# Patient Record
Sex: FEMALE | Race: WHITE | NOT HISPANIC OR LATINO
[De-identification: names, ages, dates, MRNs, and addresses within clinical notes are randomized per-mention and may not be internally consistent; named-entity substitution may affect disease eponyms.]

---

## 2017-04-12 ENCOUNTER — APPOINTMENT (OUTPATIENT)
Dept: FAMILY MEDICINE | Facility: CLINIC | Age: 67
End: 2017-04-12

## 2017-04-12 VITALS
HEART RATE: 87 BPM | TEMPERATURE: 98.3 F | WEIGHT: 190 LBS | OXYGEN SATURATION: 97 % | DIASTOLIC BLOOD PRESSURE: 84 MMHG | SYSTOLIC BLOOD PRESSURE: 142 MMHG | HEIGHT: 63 IN | BODY MASS INDEX: 33.66 KG/M2

## 2017-08-21 ENCOUNTER — RX RENEWAL (OUTPATIENT)
Age: 67
End: 2017-08-21

## 2017-10-21 ENCOUNTER — RX RENEWAL (OUTPATIENT)
Age: 67
End: 2017-10-21

## 2017-10-31 ENCOUNTER — RESULT CHARGE (OUTPATIENT)
Age: 67
End: 2017-10-31

## 2017-11-01 ENCOUNTER — NON-APPOINTMENT (OUTPATIENT)
Age: 67
End: 2017-11-01

## 2017-11-01 ENCOUNTER — APPOINTMENT (OUTPATIENT)
Dept: FAMILY MEDICINE | Facility: CLINIC | Age: 67
End: 2017-11-01
Payer: MEDICARE

## 2017-11-01 VITALS
HEIGHT: 63 IN | SYSTOLIC BLOOD PRESSURE: 134 MMHG | WEIGHT: 183.38 LBS | BODY MASS INDEX: 32.49 KG/M2 | HEART RATE: 75 BPM | DIASTOLIC BLOOD PRESSURE: 90 MMHG | OXYGEN SATURATION: 98 %

## 2017-11-01 PROCEDURE — 93000 ELECTROCARDIOGRAM COMPLETE: CPT | Mod: 59

## 2017-11-01 PROCEDURE — G0439: CPT

## 2017-11-01 PROCEDURE — 36415 COLL VENOUS BLD VENIPUNCTURE: CPT

## 2017-11-02 LAB
25(OH)D3 SERPL-MCNC: 12.1 NG/ML
ALBUMIN SERPL ELPH-MCNC: 4 G/DL
ALP BLD-CCNC: 93 U/L
ALT SERPL-CCNC: 7 U/L
ANION GAP SERPL CALC-SCNC: 23 MMOL/L
APPEARANCE: CLEAR
AST SERPL-CCNC: 18 U/L
BACTERIA: NEGATIVE
BASOPHILS # BLD AUTO: 0.06 K/UL
BASOPHILS NFR BLD AUTO: 0.7 %
BILIRUB SERPL-MCNC: <0.2 MG/DL
BILIRUBIN URINE: NEGATIVE
BLOOD URINE: NEGATIVE
BUN SERPL-MCNC: 20 MG/DL
CALCIUM SERPL-MCNC: 9.4 MG/DL
CHLORIDE SERPL-SCNC: 99 MMOL/L
CHOLEST SERPL-MCNC: 211 MG/DL
CHOLEST/HDLC SERPL: 4.7 RATIO
CO2 SERPL-SCNC: 18 MMOL/L
COLOR: YELLOW
CREAT SERPL-MCNC: 0.9 MG/DL
EOSINOPHIL # BLD AUTO: 0.36 K/UL
EOSINOPHIL NFR BLD AUTO: 4.1 %
GLUCOSE QUALITATIVE U: NEGATIVE MG/DL
GLUCOSE SERPL-MCNC: 74 MG/DL
HBA1C MFR BLD HPLC: 5.7 %
HCT VFR BLD CALC: 36.4 %
HDLC SERPL-MCNC: 45 MG/DL
HGB BLD-MCNC: 11.9 G/DL
HYALINE CASTS: 2 /LPF
IMM GRANULOCYTES NFR BLD AUTO: 0.2 %
KETONES URINE: NEGATIVE
LDLC SERPL CALC-MCNC: 125 MG/DL
LEUKOCYTE ESTERASE URINE: ABNORMAL
LYMPHOCYTES # BLD AUTO: 2.24 K/UL
LYMPHOCYTES NFR BLD AUTO: 25.6 %
MAN DIFF?: NORMAL
MCHC RBC-ENTMCNC: 28.3 PG
MCHC RBC-ENTMCNC: 32.7 GM/DL
MCV RBC AUTO: 86.7 FL
MICROSCOPIC-UA: NORMAL
MONOCYTES # BLD AUTO: 0.6 K/UL
MONOCYTES NFR BLD AUTO: 6.8 %
NEUTROPHILS # BLD AUTO: 5.48 K/UL
NEUTROPHILS NFR BLD AUTO: 62.6 %
NITRITE URINE: NEGATIVE
PH URINE: 5.5
PLATELET # BLD AUTO: 279 K/UL
POTASSIUM SERPL-SCNC: 4.2 MMOL/L
PROT SERPL-MCNC: 7.8 G/DL
PROTEIN URINE: NEGATIVE MG/DL
RBC # BLD: 4.2 M/UL
RBC # FLD: 13.4 %
RED BLOOD CELLS URINE: 1 /HPF
SODIUM SERPL-SCNC: 140 MMOL/L
SPECIFIC GRAVITY URINE: 1.02
SQUAMOUS EPITHELIAL CELLS: 2 /HPF
T4 SERPL-MCNC: 8.2 UG/DL
TRIGL SERPL-MCNC: 205 MG/DL
TSH SERPL-ACNC: 6.67 UIU/ML
UROBILINOGEN URINE: NEGATIVE MG/DL
WBC # FLD AUTO: 8.76 K/UL
WHITE BLOOD CELLS URINE: 30 /HPF

## 2017-12-20 ENCOUNTER — RESULT CHARGE (OUTPATIENT)
Age: 67
End: 2017-12-20

## 2017-12-20 ENCOUNTER — APPOINTMENT (OUTPATIENT)
Dept: FAMILY MEDICINE | Facility: CLINIC | Age: 67
End: 2017-12-20
Payer: MEDICARE

## 2017-12-20 VITALS
SYSTOLIC BLOOD PRESSURE: 140 MMHG | HEART RATE: 88 BPM | WEIGHT: 183 LBS | BODY MASS INDEX: 32.43 KG/M2 | OXYGEN SATURATION: 98 % | DIASTOLIC BLOOD PRESSURE: 90 MMHG | TEMPERATURE: 98.3 F | HEIGHT: 63 IN

## 2017-12-20 DIAGNOSIS — Z87.891 PERSONAL HISTORY OF NICOTINE DEPENDENCE: ICD-10-CM

## 2017-12-20 DIAGNOSIS — Z87.09 PERSONAL HISTORY OF OTHER DISEASES OF THE RESPIRATORY SYSTEM: ICD-10-CM

## 2017-12-20 DIAGNOSIS — Z13.220 ENCOUNTER FOR SCREENING FOR LIPOID DISORDERS: ICD-10-CM

## 2017-12-20 DIAGNOSIS — Z13.1 ENCOUNTER FOR SCREENING FOR DIABETES MELLITUS: ICD-10-CM

## 2017-12-20 DIAGNOSIS — Z13.29 ENCOUNTER FOR SCREENING FOR OTHER SUSPECTED ENDOCRINE DISORDER: ICD-10-CM

## 2017-12-20 LAB
FLUAV SPEC QL CULT: NORMAL
FLUBV AG SPEC QL IA: NORMAL

## 2017-12-20 PROCEDURE — 99213 OFFICE O/P EST LOW 20 MIN: CPT

## 2018-03-08 ENCOUNTER — LABORATORY RESULT (OUTPATIENT)
Age: 68
End: 2018-03-08

## 2018-03-09 ENCOUNTER — RX RENEWAL (OUTPATIENT)
Age: 68
End: 2018-03-09

## 2018-03-13 ENCOUNTER — APPOINTMENT (OUTPATIENT)
Dept: FAMILY MEDICINE | Facility: CLINIC | Age: 68
End: 2018-03-13

## 2018-09-13 ENCOUNTER — APPOINTMENT (OUTPATIENT)
Dept: FAMILY MEDICINE | Facility: CLINIC | Age: 68
End: 2018-09-13
Payer: MEDICARE

## 2018-09-13 VITALS
DIASTOLIC BLOOD PRESSURE: 90 MMHG | WEIGHT: 185 LBS | HEART RATE: 83 BPM | TEMPERATURE: 98.3 F | OXYGEN SATURATION: 96 % | SYSTOLIC BLOOD PRESSURE: 132 MMHG | HEIGHT: 63 IN | BODY MASS INDEX: 32.78 KG/M2

## 2018-09-13 DIAGNOSIS — Z87.09 PERSONAL HISTORY OF OTHER DISEASES OF THE RESPIRATORY SYSTEM: ICD-10-CM

## 2018-09-13 PROCEDURE — 99213 OFFICE O/P EST LOW 20 MIN: CPT

## 2018-09-13 NOTE — PHYSICAL EXAM
[Well Developed] : well developed [Well Nourished] : well nourished [Normal Outer Ear/Nose] : the outer ears and nose were normal in appearance [Normal TMs] : both tympanic membranes were normal [No JVD] : no jugular venous distention [Supple] : supple [No Lymphadenopathy] : no lymphadenopathy [No Respiratory Distress] : no respiratory distress  [No Accessory Muscle Use] : no accessory muscle use [de-identified] : Nasal congestion, pharynx red [de-identified] : Congested cough.  Wheeze, left posterior upper lobe

## 2018-09-13 NOTE — HISTORY OF PRESENT ILLNESS
[FreeTextEntry8] : Cold and congested for two days. Congested, hacking cough. Very tired and sleeping more. Still not smoking for two years. Not needing to use inhalers yet. Uses Breo when needed.\par ENT two weeks ago and was on steroids. \par Episodes of being off balance. No dizziness or falls. She knows when she feels off balance and will walk into things. 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  Ongoing ear issues and less hearing on left. \par Plans to get colonoscopy

## 2018-09-18 ENCOUNTER — RX RENEWAL (OUTPATIENT)
Age: 68
End: 2018-09-18

## 2018-11-07 ENCOUNTER — RX RENEWAL (OUTPATIENT)
Age: 68
End: 2018-11-07

## 2018-11-08 ENCOUNTER — RX RENEWAL (OUTPATIENT)
Age: 68
End: 2018-11-08

## 2018-11-14 ENCOUNTER — APPOINTMENT (OUTPATIENT)
Dept: FAMILY MEDICINE | Facility: CLINIC | Age: 68
End: 2018-11-14
Payer: MEDICARE

## 2018-11-14 ENCOUNTER — NON-APPOINTMENT (OUTPATIENT)
Age: 68
End: 2018-11-14

## 2018-11-14 VITALS
WEIGHT: 185 LBS | OXYGEN SATURATION: 96 % | DIASTOLIC BLOOD PRESSURE: 90 MMHG | SYSTOLIC BLOOD PRESSURE: 120 MMHG | TEMPERATURE: 98.5 F | BODY MASS INDEX: 33.19 KG/M2 | HEART RATE: 91 BPM | HEIGHT: 62.5 IN

## 2018-11-14 DIAGNOSIS — Z87.09 PERSONAL HISTORY OF OTHER DISEASES OF THE RESPIRATORY SYSTEM: ICD-10-CM

## 2018-11-14 DIAGNOSIS — Z08 ENCOUNTER FOR FOLLOW-UP EXAMINATION AFTER COMPLETED TREATMENT FOR MALIGNANT NEOPLASM: ICD-10-CM

## 2018-11-14 DIAGNOSIS — Z85.3 ENCOUNTER FOR FOLLOW-UP EXAMINATION AFTER COMPLETED TREATMENT FOR MALIGNANT NEOPLASM: ICD-10-CM

## 2018-11-14 PROCEDURE — G0439: CPT

## 2018-11-14 PROCEDURE — 93000 ELECTROCARDIOGRAM COMPLETE: CPT | Mod: 59

## 2018-11-14 PROCEDURE — G0444 DEPRESSION SCREEN ANNUAL: CPT | Mod: 59

## 2018-11-14 RX ORDER — AZITHROMYCIN 250 MG/1
250 TABLET, FILM COATED ORAL
Qty: 1 | Refills: 0 | Status: DISCONTINUED | COMMUNITY
Start: 2018-09-13 | End: 2018-11-14

## 2018-11-14 RX ORDER — AZITHROMYCIN 250 MG/1
250 TABLET, FILM COATED ORAL
Qty: 1 | Refills: 0 | Status: DISCONTINUED | COMMUNITY
Start: 2017-12-20 | End: 2018-11-14

## 2018-11-14 NOTE — PHYSICAL EXAM
[Well Developed] : well developed [Well Nourished] : well nourished [Normal Outer Ear/Nose] : the outer ears and nose were normal in appearance [Normal Oropharynx] : the oropharynx was normal [Normal TMs] : both tympanic membranes were normal [No JVD] : no jugular venous distention [Supple] : supple [No Respiratory Distress] : no respiratory distress  [Clear to Auscultation] : lungs were clear to auscultation bilaterally [No Accessory Muscle Use] : no accessory muscle use [Normal Rate] : normal rate  [Regular Rhythm] : with a regular rhythm [Normal S1, S2] : normal S1 and S2 [No Murmur] : no murmur heard [Normal] : normal [Soft, Nontender] : the abdomen was soft and nontender [No Mass] : no masses were palpated [No HSM] : no hepatosplenomegaly noted [Normal Supraclavicular Nodes] : no supraclavicular lymphadenopathy [Normal Posterior Cervical Nodes] : no posterior cervical lymphadenopathy [Grossly Normal Strength/Tone] : grossly normal strength/tone [No Focal Deficits] : no focal deficits [Normal Mental Status] : the patient's orientation, memory, attention, language and fund of knowledge were normal [Appropriate] : appropriate [Impaired judgment] : intact judgment [Impaired Insight] : intact insight [de-identified] : Non-tender enlarged lymph node left anterior cervical

## 2018-11-14 NOTE — HISTORY OF PRESENT ILLNESS
[de-identified] : Annual CPE.  Follow up on depression, hypothyroid and labile hypertension. She stopped losartan as her BP was always normal when taken. \par She sees pulmonary on a regular basis for COPD.  Recent Xray that showed pneumonia also showed lung nodule. She is going for PET scan.  She is very nervous about the results. She is having PFT tomorrow.\par She has seen ENT and had two US done of enlarged lymph node left side of neck.  No biopsy taken. She feels the node is larger now.\shiloh Takes vitamin D 1000 units daily\shiloh Will be in [place] for three weeks caring for her sister who just had double lung transplant. Very stressful for patient.

## 2018-11-15 LAB
25(OH)D3 SERPL-MCNC: 30.7 NG/ML
ALBUMIN SERPL ELPH-MCNC: 4.3 G/DL
ALP BLD-CCNC: 90 U/L
ALT SERPL-CCNC: 6 U/L
ANION GAP SERPL CALC-SCNC: 12 MMOL/L
APPEARANCE: CLEAR
AST SERPL-CCNC: 18 U/L
BACTERIA: NEGATIVE
BASOPHILS # BLD AUTO: 0.06 K/UL
BASOPHILS NFR BLD AUTO: 0.7 %
BILIRUB SERPL-MCNC: <0.2 MG/DL
BILIRUBIN URINE: NEGATIVE
BLOOD URINE: ABNORMAL
BUN SERPL-MCNC: 22 MG/DL
CALCIUM SERPL-MCNC: 9.8 MG/DL
CHLORIDE SERPL-SCNC: 102 MMOL/L
CHOLEST SERPL-MCNC: 224 MG/DL
CHOLEST/HDLC SERPL: 3.9 RATIO
CO2 SERPL-SCNC: 26 MMOL/L
COLOR: YELLOW
CREAT SERPL-MCNC: 1.28 MG/DL
EOSINOPHIL # BLD AUTO: 0.36 K/UL
EOSINOPHIL NFR BLD AUTO: 4 %
GLUCOSE QUALITATIVE U: NEGATIVE MG/DL
GLUCOSE SERPL-MCNC: 83 MG/DL
HBA1C MFR BLD HPLC: 5.7 %
HCT VFR BLD CALC: 38.5 %
HDLC SERPL-MCNC: 57 MG/DL
HGB BLD-MCNC: 12.3 G/DL
HYALINE CASTS: 2 /LPF
IMM GRANULOCYTES NFR BLD AUTO: 0.2 %
KETONES URINE: NEGATIVE
LDLC SERPL CALC-MCNC: 141 MG/DL
LEUKOCYTE ESTERASE URINE: ABNORMAL
LYMPHOCYTES # BLD AUTO: 1.87 K/UL
LYMPHOCYTES NFR BLD AUTO: 20.7 %
MAN DIFF?: NORMAL
MCHC RBC-ENTMCNC: 27.2 PG
MCHC RBC-ENTMCNC: 31.9 GM/DL
MCV RBC AUTO: 85.2 FL
MICROSCOPIC-UA: NORMAL
MONOCYTES # BLD AUTO: 0.62 K/UL
MONOCYTES NFR BLD AUTO: 6.9 %
NEUTROPHILS # BLD AUTO: 6.12 K/UL
NEUTROPHILS NFR BLD AUTO: 67.5 %
NITRITE URINE: NEGATIVE
PH URINE: 6
PLATELET # BLD AUTO: 312 K/UL
POTASSIUM SERPL-SCNC: 4.4 MMOL/L
PROT SERPL-MCNC: 7.8 G/DL
PROTEIN URINE: NEGATIVE MG/DL
RBC # BLD: 4.52 M/UL
RBC # FLD: 13.8 %
RED BLOOD CELLS URINE: 5 /HPF
SODIUM SERPL-SCNC: 140 MMOL/L
SPECIFIC GRAVITY URINE: 1.02
SQUAMOUS EPITHELIAL CELLS: 7 /HPF
T4 SERPL-MCNC: 9.1 UG/DL
TRIGL SERPL-MCNC: 132 MG/DL
TSH SERPL-ACNC: 1.89 UIU/ML
UROBILINOGEN URINE: NEGATIVE MG/DL
WBC # FLD AUTO: 9.05 K/UL
WHITE BLOOD CELLS URINE: 49 /HPF

## 2018-11-16 ENCOUNTER — RX RENEWAL (OUTPATIENT)
Age: 68
End: 2018-11-16

## 2018-12-29 ENCOUNTER — APPOINTMENT (OUTPATIENT)
Dept: FAMILY MEDICINE | Facility: CLINIC | Age: 68
End: 2018-12-29
Payer: MEDICARE

## 2018-12-29 VITALS
HEART RATE: 75 BPM | WEIGHT: 185 LBS | HEIGHT: 62.5 IN | DIASTOLIC BLOOD PRESSURE: 78 MMHG | BODY MASS INDEX: 33.19 KG/M2 | OXYGEN SATURATION: 98 % | SYSTOLIC BLOOD PRESSURE: 122 MMHG

## 2018-12-29 DIAGNOSIS — M54.30 SCIATICA, UNSPECIFIED SIDE: ICD-10-CM

## 2018-12-29 DIAGNOSIS — J20.9 ACUTE BRONCHITIS, UNSPECIFIED: ICD-10-CM

## 2018-12-29 PROCEDURE — 99214 OFFICE O/P EST MOD 30 MIN: CPT

## 2018-12-29 NOTE — PHYSICAL EXAM
[No Acute Distress] : no acute distress [Well-Appearing] : well-appearing [Normal Sclera/Conjunctiva] : normal sclera/conjunctiva [PERRL] : pupils equal round and reactive to light [Normal Outer Ear/Nose] : the outer ears and nose were normal in appearance [Normal Oropharynx] : the oropharynx was normal [Supple] : supple [No Respiratory Distress] : no respiratory distress  [Clear to Auscultation] : lungs were clear to auscultation bilaterally [Normal Rate] : normal rate  [Regular Rhythm] : with a regular rhythm [Normal Affect] : the affect was normal [Normal Insight/Judgement] : insight and judgment were intact [de-identified] : + [de-identified] : + [de-identified] : +TTP with AC lymphadenopathy

## 2018-12-29 NOTE — PLAN
[FreeTextEntry1] : Bronchitis - start doxy and prednisone given pts recent exposure in hospital and recent biopsy\par Sciatica- Naproxen as needed.

## 2018-12-29 NOTE — REVIEW OF SYSTEMS
[Fever] : no fever [Chills] : no chills [Fatigue] : no fatigue [Discharge] : no discharge [Vision Problems] : no vision problems [Earache] : no earache [Nasal Discharge] : nasal discharge [Sore Throat] : sore throat [Postnasal Drip] : postnasal drip [Chest Pain] : no chest pain [Palpitations] : no palpitations [Shortness Of Breath] : no shortness of breath [Wheezing] : no wheezing [Cough] : cough [Abdominal Pain] : no abdominal pain [Nausea] : no nausea [Diarrhea] : diarrhea [Vomiting] : no vomiting [Headache] : headache [Dizziness] : no dizziness [Swollen Glands] : swollen glands

## 2018-12-29 NOTE — HISTORY OF PRESENT ILLNESS
[FreeTextEntry8] : 68 year old female with pmhx of asthma, copd, recent parotid gland biopsy presents with one day history of sore throat, headaches and congestion. Also has bl pain radiating down thighs consistent with sciatica which is causing difficulty sleeping. Her sister was in hopsital for 3 weeks so she had been sitting in hospital for 7 hours  day most of the week

## 2019-02-06 ENCOUNTER — APPOINTMENT (OUTPATIENT)
Dept: OTOLARYNGOLOGY | Facility: CLINIC | Age: 69
End: 2019-02-06
Payer: MEDICARE

## 2019-02-06 VITALS
WEIGHT: 185 LBS | DIASTOLIC BLOOD PRESSURE: 74 MMHG | BODY MASS INDEX: 33.19 KG/M2 | SYSTOLIC BLOOD PRESSURE: 134 MMHG | HEART RATE: 90 BPM | HEIGHT: 62.5 IN

## 2019-02-06 PROCEDURE — 99205 OFFICE O/P NEW HI 60 MIN: CPT

## 2019-02-06 NOTE — HISTORY OF PRESENT ILLNESS
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 Pulmonologist  ordered pet scan hx COPD, Left parotid lesion measuring 1.7 x 1.3cm SUV value is 9.8. FNA significant findings? Pt states left ear clogged. left neck spasms more frequent recently, difficulty swallowing worse over last several weeks. Denies pain. pt is feeling tingling last few weeks L parotid region post bx. recent lung nodule w/u that led to dx above, felt to be benign, did not light up on scan. pt w COPD and quit smoker.

## 2019-02-06 NOTE — CONSULT LETTER
[Dear  ___] : Dear  [unfilled], [Consult Letter:] : I had the pleasure of evaluating your patient, [unfilled]. [Please see my note below.] : Please see my note below. [Consult Closing:] : Thank you very much for allowing me to participate in the care of this patient.  If you have any questions, please do not hesitate to contact me. [Sincerely,] : Sincerely, [FreeTextEntry2] : Clyde Contreras MD ([place], [place]) [FreeTextEntry3] : Titi Richardson MD

## 2019-02-06 NOTE — PHYSICAL EXAM
[Nodule] : nodule [Midline] : trachea located in midline position [Normal] : no rashes [FreeTextEntry1] : fullness post auric on L adjacent to mastoid.  no plap nodes

## 2019-02-15 ENCOUNTER — APPOINTMENT (OUTPATIENT)
Dept: PLASTIC SURGERY | Facility: CLINIC | Age: 69
End: 2019-02-15

## 2019-02-19 ENCOUNTER — FORM ENCOUNTER (OUTPATIENT)
Age: 69
End: 2019-02-19

## 2019-02-20 ENCOUNTER — OUTPATIENT (OUTPATIENT)
Dept: OUTPATIENT SERVICES | Facility: HOSPITAL | Age: 69
LOS: 1 days | End: 2019-02-20
Payer: MEDICARE

## 2019-02-20 ENCOUNTER — RESULT REVIEW (OUTPATIENT)
Age: 69
End: 2019-02-20

## 2019-02-20 ENCOUNTER — APPOINTMENT (OUTPATIENT)
Dept: ULTRASOUND IMAGING | Facility: IMAGING CENTER | Age: 69
End: 2019-02-20
Payer: MEDICARE

## 2019-02-20 DIAGNOSIS — D49.0 NEOPLASM OF UNSPECIFIED BEHAVIOR OF DIGESTIVE SYSTEM: ICD-10-CM

## 2019-02-20 PROCEDURE — 88342 IMHCHEM/IMCYTCHM 1ST ANTB: CPT | Mod: 26,59

## 2019-02-20 PROCEDURE — 10005 FNA BX W/US GDN 1ST LES: CPT

## 2019-02-20 PROCEDURE — 88305 TISSUE EXAM BY PATHOLOGIST: CPT

## 2019-02-20 PROCEDURE — 88341 IMHCHEM/IMCYTCHM EA ADD ANTB: CPT

## 2019-02-20 PROCEDURE — 88360 TUMOR IMMUNOHISTOCHEM/MANUAL: CPT

## 2019-02-20 PROCEDURE — 88173 CYTOPATH EVAL FNA REPORT: CPT | Mod: 26

## 2019-02-20 PROCEDURE — 88342 IMHCHEM/IMCYTCHM 1ST ANTB: CPT

## 2019-02-20 PROCEDURE — 88172 CYTP DX EVAL FNA 1ST EA SITE: CPT

## 2019-02-20 PROCEDURE — 88305 TISSUE EXAM BY PATHOLOGIST: CPT | Mod: 26

## 2019-02-20 PROCEDURE — 88341 IMHCHEM/IMCYTCHM EA ADD ANTB: CPT | Mod: 26,59

## 2019-02-20 PROCEDURE — 88173 CYTOPATH EVAL FNA REPORT: CPT

## 2019-02-20 PROCEDURE — 88360 TUMOR IMMUNOHISTOCHEM/MANUAL: CPT | Mod: 26

## 2019-02-22 ENCOUNTER — RESULT REVIEW (OUTPATIENT)
Age: 69
End: 2019-02-22

## 2019-02-22 ENCOUNTER — TRANSCRIPTION ENCOUNTER (OUTPATIENT)
Age: 69
End: 2019-02-22

## 2019-02-25 LAB — NON-GYNECOLOGICAL CYTOLOGY STUDY: SIGNIFICANT CHANGE UP

## 2019-02-26 ENCOUNTER — TRANSCRIPTION ENCOUNTER (OUTPATIENT)
Age: 69
End: 2019-02-26

## 2019-02-26 ENCOUNTER — APPOINTMENT (OUTPATIENT)
Dept: OTOLARYNGOLOGY | Facility: CLINIC | Age: 69
End: 2019-02-26

## 2019-02-27 ENCOUNTER — APPOINTMENT (OUTPATIENT)
Dept: OTOLARYNGOLOGY | Facility: CLINIC | Age: 69
End: 2019-02-27
Payer: MEDICARE

## 2019-02-27 VITALS — SYSTOLIC BLOOD PRESSURE: 174 MMHG | HEART RATE: 84 BPM | TEMPERATURE: 97.7 F | DIASTOLIC BLOOD PRESSURE: 73 MMHG

## 2019-02-27 PROCEDURE — 99214 OFFICE O/P EST MOD 30 MIN: CPT

## 2019-02-27 NOTE — HISTORY OF PRESENT ILLNESS
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  . 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  Pt states area of bx some discomfort. here for manageemnt discussion after TB discussion yesterday.

## 2019-02-27 NOTE — CONSULT LETTER
[Dear  ___] : Dear  [unfilled], [Courtesy Letter:] : I had the pleasure of seeing your patient, [unfilled], in my office today. [Please see my note below.] : Please see my note below. [Sincerely,] : Sincerely, [FreeTextEntry2] : Clyde Contreras MD ([place], [place]) [FreeTextEntry3] : Titi Richardson MD

## 2019-02-28 ENCOUNTER — TRANSCRIPTION ENCOUNTER (OUTPATIENT)
Age: 69
End: 2019-02-28

## 2019-03-04 ENCOUNTER — OUTPATIENT (OUTPATIENT)
Dept: OUTPATIENT SERVICES | Facility: HOSPITAL | Age: 69
LOS: 1 days | End: 2019-03-04
Payer: MEDICARE

## 2019-03-04 ENCOUNTER — APPOINTMENT (OUTPATIENT)
Dept: PLASTIC SURGERY | Facility: CLINIC | Age: 69
End: 2019-03-04
Payer: MEDICARE

## 2019-03-04 VITALS
HEART RATE: 73 BPM | RESPIRATION RATE: 16 BRPM | OXYGEN SATURATION: 96 % | SYSTOLIC BLOOD PRESSURE: 160 MMHG | HEIGHT: 62 IN | TEMPERATURE: 99 F | WEIGHT: 190.04 LBS | DIASTOLIC BLOOD PRESSURE: 102 MMHG

## 2019-03-04 DIAGNOSIS — D49.0 NEOPLASM OF UNSPECIFIED BEHAVIOR OF DIGESTIVE SYSTEM: ICD-10-CM

## 2019-03-04 DIAGNOSIS — C80.1 MALIGNANT (PRIMARY) NEOPLASM, UNSPECIFIED: Chronic | ICD-10-CM

## 2019-03-04 DIAGNOSIS — I10 ESSENTIAL (PRIMARY) HYPERTENSION: ICD-10-CM

## 2019-03-04 DIAGNOSIS — C50.919 MALIGNANT NEOPLASM OF UNSPECIFIED SITE OF UNSPECIFIED FEMALE BREAST: Chronic | ICD-10-CM

## 2019-03-04 DIAGNOSIS — K11.9 DISEASE OF SALIVARY GLAND, UNSPECIFIED: Chronic | ICD-10-CM

## 2019-03-04 DIAGNOSIS — R06.02 SHORTNESS OF BREATH: ICD-10-CM

## 2019-03-04 DIAGNOSIS — K11.9 DISEASE OF SALIVARY GLAND, UNSPECIFIED: ICD-10-CM

## 2019-03-04 DIAGNOSIS — M12.9 ARTHROPATHY, UNSPECIFIED: Chronic | ICD-10-CM

## 2019-03-04 LAB
ALBUMIN SERPL ELPH-MCNC: 4.1 G/DL — SIGNIFICANT CHANGE UP (ref 3.3–5)
ALP SERPL-CCNC: 82 U/L — SIGNIFICANT CHANGE UP (ref 40–120)
ALT FLD-CCNC: 6 U/L — SIGNIFICANT CHANGE UP (ref 4–33)
ANION GAP SERPL CALC-SCNC: 15 MMO/L — HIGH (ref 7–14)
AST SERPL-CCNC: 15 U/L — SIGNIFICANT CHANGE UP (ref 4–32)
BILIRUB SERPL-MCNC: < 0.2 MG/DL — LOW (ref 0.2–1.2)
BLD GP AB SCN SERPL QL: NEGATIVE — SIGNIFICANT CHANGE UP
BUN SERPL-MCNC: 20 MG/DL — SIGNIFICANT CHANGE UP (ref 7–23)
CALCIUM SERPL-MCNC: 9.8 MG/DL — SIGNIFICANT CHANGE UP (ref 8.4–10.5)
CHLORIDE SERPL-SCNC: 102 MMOL/L — SIGNIFICANT CHANGE UP (ref 98–107)
CO2 SERPL-SCNC: 24 MMOL/L — SIGNIFICANT CHANGE UP (ref 22–31)
CREAT SERPL-MCNC: 0.87 MG/DL — SIGNIFICANT CHANGE UP (ref 0.5–1.3)
GLUCOSE SERPL-MCNC: 95 MG/DL — SIGNIFICANT CHANGE UP (ref 70–99)
HCT VFR BLD CALC: 38.7 % — SIGNIFICANT CHANGE UP (ref 34.5–45)
HGB BLD-MCNC: 12.6 G/DL — SIGNIFICANT CHANGE UP (ref 11.5–15.5)
MCHC RBC-ENTMCNC: 27.2 PG — SIGNIFICANT CHANGE UP (ref 27–34)
MCHC RBC-ENTMCNC: 32.6 % — SIGNIFICANT CHANGE UP (ref 32–36)
MCV RBC AUTO: 83.6 FL — SIGNIFICANT CHANGE UP (ref 80–100)
NRBC # FLD: 0 K/UL — LOW (ref 25–125)
PLATELET # BLD AUTO: 288 K/UL — SIGNIFICANT CHANGE UP (ref 150–400)
PMV BLD: 9.8 FL — SIGNIFICANT CHANGE UP (ref 7–13)
POTASSIUM SERPL-MCNC: 3.7 MMOL/L — SIGNIFICANT CHANGE UP (ref 3.5–5.3)
POTASSIUM SERPL-SCNC: 3.7 MMOL/L — SIGNIFICANT CHANGE UP (ref 3.5–5.3)
PROT SERPL-MCNC: 7.3 G/DL — SIGNIFICANT CHANGE UP (ref 6–8.3)
RBC # BLD: 4.63 M/UL — SIGNIFICANT CHANGE UP (ref 3.8–5.2)
RBC # FLD: 13.2 % — SIGNIFICANT CHANGE UP (ref 10.3–14.5)
RH IG SCN BLD-IMP: POSITIVE — SIGNIFICANT CHANGE UP
SODIUM SERPL-SCNC: 141 MMOL/L — SIGNIFICANT CHANGE UP (ref 135–145)
WBC # BLD: 10.01 K/UL — SIGNIFICANT CHANGE UP (ref 3.8–10.5)
WBC # FLD AUTO: 10.01 K/UL — SIGNIFICANT CHANGE UP (ref 3.8–10.5)

## 2019-03-04 PROCEDURE — 99214 OFFICE O/P EST MOD 30 MIN: CPT

## 2019-03-04 PROCEDURE — 93010 ELECTROCARDIOGRAM REPORT: CPT

## 2019-03-04 PROCEDURE — 99203 OFFICE O/P NEW LOW 30 MIN: CPT

## 2019-03-04 RX ORDER — SODIUM CHLORIDE 9 MG/ML
1000 INJECTION, SOLUTION INTRAVENOUS
Qty: 0 | Refills: 0 | Status: DISCONTINUED | OUTPATIENT
Start: 2019-03-21 | End: 2019-03-21

## 2019-03-04 NOTE — H&P PST ADULT - PMH
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 -- had excision with post op RT Alcohol abuse  quit 30 years ago  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 -- had excision with post op RT Alcohol abuse  quit 30 years ago  Anemia  during chemotherapy for right breast cancer in 2002  Anxiety    Breast cancer  diagnosed in 2002, had right lumpectomy (estrogen receptive positive, HER 2 positive 3) with post op chemotherapy and RT  Cancer  2019, recurrence of left parotid cancer  COPD (chronic obstructive pulmonary disease)    Hypertension  had been on short-term medication  Hypothyroid    Lung mass  2019  Parotid mass  diagnosed in 2007 ("adenoid cystic carcinoma) of left parotid -- had excision with post op RT

## 2019-03-04 NOTE — H&P PST ADULT - NSANTHOSAYNRD_GEN_A_CORE
No. LOAN screening performed.  STOP BANG Legend: 0-2 = LOW Risk; 3-4 = INTERMEDIATE Risk; 5-8 = HIGH Risk

## 2019-03-04 NOTE — H&P PST ADULT - RESPIRATORY COMMENTS
recently placed on prednisone for SOB by her pcp recently placed on prednisone for SOB by her pcp; b/l expiratory wheezing

## 2019-03-04 NOTE — H&P PST ADULT - PROBLEM SELECTOR PLAN 1
This is a  69 y/o female who is scheduled for left parotidectomy, radical resection mandible on 3-21-19  * Given preop instructions

## 2019-03-04 NOTE — H&P PST ADULT - HISTORY OF PRESENT ILLNESS
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 Patient recently c/o "feeling sick." CXR performed with finding of lung lesion. Subsequent PET scan revealed lung lesion and eventual PET scan (done based on her history) revealed left parotid mass. Needle biopsy revealed recurrence of parotid cancer. 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 Patient recently c/o "feeling sick." CXR performed with finding of lung lesion (told it was "benign"). Subsequent PET scan revealed lung lesion and eventual PET scan (done based on her history) revealed left parotid mass. Needle biopsy revealed recurrence of parotid cancer. Scheduled for left parotidectomy, radical resection mandible on 3-21-19

## 2019-03-04 NOTE — H&P PST ADULT - PSH
"[facility] Progress Note    Chief Complaint:  Right flank pain    Subjective :     Doing better, some chills today. No high fevers- cardiology note appreciated.  Gross hematuria. Mild LUTS.    Review of Systems:    The following systems were reviewed and negative;  respiratory, cardiovascular and gastrointestinal    Objective     Vital Signs  Visit Vitals   • /70 (BP Location: Right arm, Patient Position: Lying)   • Pulse 90   • Temp 97 °F (36.1 °C) (Oral)   • Resp 18   • Ht 64\" (162.6 cm)   • Wt 254 lb (115 kg)   • LMP  (LMP Unknown)   • SpO2 92%   • BMI 43.6 kg/m2       Physical Exam:     General Appearance:    Alert, cooperative, in no acute distress   Head:    Normocephalic, without obvious abnormality, atraumatic   Eyes:            Lids and lashes normal, conjunctivae and sclerae normal, no   icterus, no pallor, corneas clear, PERRLA   Ears:    Ears appear intact with no abnormalities noted   Throat:   No oral lesions, no thrush, oral mucosa moist   Neck:   No adenopathy, supple, trachea midline, no thyromegaly, no     carotid bruit, no JVD   Back:     No kyphosis present, no scoliosis present, no skin lesions,       erythema or scars, no tenderness to percussion or                   palpation,   range of motion normal   Lungs:     Clear to auscultation,respirations regular, even and                   unlabored    Heart:    Regular rhythm and normal rate, normal S1 and S2, no            murmur, no gallop, no rub, no click   Breast Exam:    Deferred   Abdomen:     Normal bowel sounds, no masses, no organomegaly, soft        non-tender, non-distended, no guarding, no rebound                 tenderness   Genitalia:    Deferred   Extremities:   Moves all extremities well, no edema, no cyanosis, no              redness   Pulses:   Pulses palpable and equal bilaterally   Skin:   No bleeding, bruising or rash   Lymph nodes:   No palpable adenopathy   Neurologic:   Cranial nerves 2 - 12 grossly intact, " Arthropathy  b/l knees (one in 2005 and other side in 2010)  Breast cancer  2002 right lumpectomy with post op chemotherapy and RT  Parotid mass  diagnosed in 2007 with excision of left parotid ("adenoid cystic carcinoma") with post op RT sensation intact, DTR        present and equal bilaterally        Results Review:     I reviewed the patient's new clinical results.  Lab Results (last 24 hours)     Procedure Component Value Units Date/Time    Urine Culture [[id number]]  (Abnormal)  (Susceptibility) Collected:  03/18/17 1655    Specimen:  Urine from Urine, Clean Catch Updated:  03/20/17 1101     Urine Culture >100,000 CFU/mL Escherichia coli (A)     Susceptibility      Escherichia coli     VINEET     Ampicillin >=32 ug/ml Resistant     Ampicillin + Sulbactam 16 ug/ml Intermediate     Cefazolin <=4 ug/ml Susceptible     Cefepime <=1 ug/ml Susceptible     Ceftriaxone <=1 ug/ml Susceptible     Ciprofloxacin <=0.25 ug/ml Susceptible     Ertapenem <=0.5 ug/ml Susceptible     Gentamicin <=1 ug/ml Susceptible     Levofloxacin <=0.12 ug/ml Susceptible     Nitrofurantoin <=16 ug/ml Susceptible     Piperacillin + Tazobactam <=4 ug/ml Susceptible     Tetracycline >=16 ug/ml Resistant     Trimethoprim + Sulfamethoxazole <=20 ug/ml Susceptible                        Imaging Results (last 24 hours)     ** No results found for the last 24 hours. **          Medication Review:   I have personally reviewed    Current Facility-Administered Medications:   •  acetaminophen (TYLENOL) tablet 650 mg, 650 mg, Oral, Q6H PRN, Dipesh Bean MD, 650 mg at 03/20/17 0201  •  albuterol (PROVENTIL) nebulizer solution 0.083% 2.5 mg/3mL, 2.5 mg, Nebulization, Q4H PRN, Dipesh Bean MD  •  budesonide-formoterol (SYMBICORT) 80-4.5 MCG/ACT inhaler 2 puff, 2 puff, Inhalation, BID - RT, Dipesh Bean MD, 2 puff at 03/19/17 2047  •  cefTRIAXone (ROCEPHIN) IVPB 1 g, 1 g, Intravenous, Q24H, Dipesh Bean MD, Stopped at 03/19/17 1906  •  FLUoxetine (PROzac) capsule 20 mg, 20 mg, Oral, Daily, Dipesh Bean MD, 20 mg at 03/20/17 0923  •  hydrochlorothiazide (HYDRODIURIL) tablet 25 mg, 25 mg, Oral, Daily, Dipesh Bean MD, 25 mg at 03/20/17 0923  •   HYDROcodone-acetaminophen (NORCO) 7.5-325 MG per tablet 1 tablet, 1 tablet, Oral, Q6H PRN, Dipesh Bean MD, 1 tablet at 03/20/17 0921  •  HYDROmorphone (DILAUDID) injection 0.5 mg, 0.5 mg, Intravenous, Q2H PRN **AND** naloxone (NARCAN) injection 0.1 mg, 0.1 mg, Intravenous, Q5 Min PRN, Dipesh Bean MD  •  lisinopril (PRINIVIL,ZESTRIL) tablet 40 mg, 40 mg, Oral, Daily, Glenis Borrego APRN, 40 mg at 03/20/17 0922  •  ondansetron (ZOFRAN) tablet 4 mg, 4 mg, Oral, Q6H PRN **OR** ondansetron ODT (ZOFRAN-ODT) disintegrating tablet 4 mg, 4 mg, Oral, Q6H PRN **OR** ondansetron (ZOFRAN) injection 4 mg, 4 mg, Intravenous, Q6H PRN, Dipesh Bean MD  •  sennosides-docusate sodium (SENOKOT-S) 8.6-50 MG tablet 2 tablet, 2 tablet, Oral, BID, Dipesh Bean MD, 2 tablet at 03/20/17 0922  •  sodium chloride 0.9 % infusion, 100 mL/hr, Intravenous, Continuous, Dipesh Bean MD, Last Rate: 100 mL/hr at 03/20/17 0725, 100 mL/hr at 03/20/17 0725  •  tiotropium (SPIRIVA) 18 MCG per inhalation capsule 1 capsule, 1 capsule, Inhalation, Daily - RT, Dipesh Bean MD, 1 capsule at 03/20/17 0820  •  verapamil (CALAN) tablet 80 mg, 80 mg, Oral, Daily, Glenis Borrego APRN, 80 mg at 03/20/17 0922    Allergies:    Contrast dye; Tenoretic [atenolol-chlorthalidone]; and Iodinated diagnostic agents    Assessment:     Active Problems:  Patient Active Problem List   Diagnosis   • Obstructive pyelonephritis   • Chronic allergic conjunctivitis   • Chronic back pain   • Chronic bronchitis   • Non-specific colitis   • Chronic obstructive pulmonary disease with acute exacerbation   • Degeneration of intervertebral disc of lumbar region   • Depression   • Fatigue   • Heartburn   • Herpes labialis   • Hyperglycemia   • Mixed hyperlipidemia   • Essential hypertension   • Hypocalcemia   • Hypokalemia   • Sprain of back   • Spinal stenosis of lumbar region   • Morbid obesity   • Osteopenia   • Lumbar  radiculopathy   • Shortness of breath   • Type 2 diabetes mellitus with complication, without long-term current use of insulin   • Increased frequency of urination   • Anemia   • Hypopotassemia   • Asymptomatic stenosis of right carotid artery   • Carotid stenosis, asymptomatic   • Chronic respiratory failure with hypoxia   • Carotid stenosis   • Hospital discharge follow-up   • Cellulitis of neck   • Low back pain   • Osteoarthritis of lumbar spine   • Need for immunization against influenza   • Well controlled type 2 diabetes mellitus   • Chronic right hip pain   • Chronic pain of both shoulders   • Fall   • Bursitis, subacromial, right   • Urine frequency   • Acute cystitis with hematuria   • Right ureteral stone       Obstructing Pyelo    Plan:    Home 3/21 on oral abx       Dipesh Bean MD    3/20/2017  12:01 PM   Arthropathy  b/l knees (one in 2005 and other side in 2010)  Breast cancer  2002 right lumpectomy with post op chemotherapy and RT  Cancer  [id number] inserted for chemotherapy in 2002 and then removed when chemotherapy completed  Parotid mass  diagnosed in 2007 with excision of left parotid ("adenoid cystic carcinoma") with post op RT

## 2019-03-04 NOTE — H&P PST ADULT - PROBLEM SELECTOR PLAN 2
Await medical evaluation with pcp due to b/l expiratory wheeze at PAST, METS less than 4, and due to elevated BP at PAST with h/o htn but not on medication  * Instructed to take normal am dose of Lexapro, levothyroxine, trelegy, Proair (if needed) the am of surgery Await medical evaluation with pcp due to b/l expiratory wheeze at PAST, METS less than 4, and due to elevated BP at PAST with h/o htn but not on medication  * Need to notify surgeon of preop medical evaluation--- spoke to Chapis in surgeon's office  * Instructed to take normal am dose of Lexapro, levothyroxine, trelegy ellipta, Proair (if needed) the am of surgery

## 2019-03-05 ENCOUNTER — APPOINTMENT (OUTPATIENT)
Dept: FAMILY MEDICINE | Facility: CLINIC | Age: 69
End: 2019-03-05
Payer: MEDICARE

## 2019-03-05 VITALS
DIASTOLIC BLOOD PRESSURE: 102 MMHG | BODY MASS INDEX: 33.19 KG/M2 | HEART RATE: 79 BPM | OXYGEN SATURATION: 97 % | TEMPERATURE: 98.5 F | SYSTOLIC BLOOD PRESSURE: 168 MMHG | WEIGHT: 185 LBS | RESPIRATION RATE: 16 BRPM | HEIGHT: 62.5 IN

## 2019-03-05 DIAGNOSIS — R09.89 OTHER SPECIFIED SYMPTOMS AND SIGNS INVOLVING THE CIRCULATORY AND RESPIRATORY SYSTEMS: ICD-10-CM

## 2019-03-05 DIAGNOSIS — Z87.09 PERSONAL HISTORY OF OTHER DISEASES OF THE RESPIRATORY SYSTEM: ICD-10-CM

## 2019-03-05 PROCEDURE — 99214 OFFICE O/P EST MOD 30 MIN: CPT

## 2019-03-05 RX ORDER — DOXYCYCLINE HYCLATE 100 MG/1
100 CAPSULE ORAL
Qty: 10 | Refills: 0 | Status: DISCONTINUED | COMMUNITY
Start: 2018-12-29 | End: 2019-03-05

## 2019-03-05 NOTE — HISTORY OF PRESENT ILLNESS
[FreeTextEntry8] : Two days of wheezing and chest tightness. Chest  very tight and SOB when outside in cold air  yesterday. Her COPD is flaring and she started PO steroids 40 mg daily. She does not have enough steroids at home. \par BP elevated yesterday at pre-surgical testing. She has surgery scheduled  3/21. She is very worried and upset about the third r reoccurrence of parotid cancer. She is worried about surgical complications and the outcome. She has seen multiple doctors and is upset and scared. Increased her Lexapro to 30 mg. \par She has not been taking her losartan.

## 2019-03-05 NOTE — HISTORY OF PRESENT ILLNESS
[FreeTextEntry1] : 68F referred by Dr. 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 Pulmonologist  ordered pet scan hx COPD, Left parotid lesion measuring 1.7 x 1.3cm SUV value is 9.8. FNA significant findings? Pt states left ear clogged. left neck spasms more frequent recently, difficulty swallowing worse over last several weeks. Denies pain. pt is feeling tingling last few weeks L parotid region post bx. recent lung nodule w/u that led to dx above, felt to be benign, did not light up on scan. pt w COPD and quit smoker. Patient is scheduled for completion proctectomy. The patient presents today to discuss facial nerve reinnervation.

## 2019-03-05 NOTE — PHYSICAL EXAM
[Well Developed] : well developed [Well Nourished] : well nourished [No Respiratory Distress] : no respiratory distress  [No Accessory Muscle Use] : no accessory muscle use [Normal Rate] : normal rate  [Regular Rhythm] : with a regular rhythm [Anxious] : anxious [Depressed] : depressed [de-identified] : Deep expiratory wheeze. Productive cough

## 2019-03-06 PROBLEM — R91.8 OTHER NONSPECIFIC ABNORMAL FINDING OF LUNG FIELD: Chronic | Status: ACTIVE | Noted: 2019-03-04

## 2019-03-06 PROBLEM — D64.9 ANEMIA, UNSPECIFIED: Chronic | Status: ACTIVE | Noted: 2019-03-04

## 2019-03-06 PROBLEM — C50.919 MALIGNANT NEOPLASM OF UNSPECIFIED SITE OF UNSPECIFIED FEMALE BREAST: Chronic | Status: ACTIVE | Noted: 2019-03-04

## 2019-03-06 PROBLEM — K11.9 DISEASE OF SALIVARY GLAND, UNSPECIFIED: Chronic | Status: ACTIVE | Noted: 2019-03-04

## 2019-03-06 PROBLEM — E03.9 HYPOTHYROIDISM, UNSPECIFIED: Chronic | Status: ACTIVE | Noted: 2019-03-04

## 2019-03-06 PROBLEM — F10.10 ALCOHOL ABUSE, UNCOMPLICATED: Chronic | Status: ACTIVE | Noted: 2019-03-04

## 2019-03-08 ENCOUNTER — RX RENEWAL (OUTPATIENT)
Age: 69
End: 2019-03-08

## 2019-03-08 ENCOUNTER — APPOINTMENT (OUTPATIENT)
Dept: CT IMAGING | Facility: CLINIC | Age: 69
End: 2019-03-08

## 2019-03-08 ENCOUNTER — APPOINTMENT (OUTPATIENT)
Dept: MRI IMAGING | Facility: CLINIC | Age: 69
End: 2019-03-08

## 2019-03-12 ENCOUNTER — APPOINTMENT (OUTPATIENT)
Dept: FAMILY MEDICINE | Facility: CLINIC | Age: 69
End: 2019-03-12
Payer: MEDICARE

## 2019-03-12 VITALS
SYSTOLIC BLOOD PRESSURE: 126 MMHG | WEIGHT: 185 LBS | HEART RATE: 81 BPM | HEIGHT: 62.5 IN | BODY MASS INDEX: 33.19 KG/M2 | OXYGEN SATURATION: 98 % | DIASTOLIC BLOOD PRESSURE: 92 MMHG

## 2019-03-12 VITALS — SYSTOLIC BLOOD PRESSURE: 110 MMHG | DIASTOLIC BLOOD PRESSURE: 80 MMHG | RESPIRATION RATE: 16 BRPM

## 2019-03-12 PROCEDURE — 99214 OFFICE O/P EST MOD 30 MIN: CPT

## 2019-03-12 RX ORDER — FLUTICASONE PROPIONATE 50 UG/1
50 SPRAY, METERED NASAL
Qty: 16 | Refills: 0 | Status: DISCONTINUED | COMMUNITY
Start: 2018-10-18 | End: 2019-03-12

## 2019-03-12 RX ORDER — PREDNISONE 10 MG/1
10 TABLET ORAL
Qty: 20 | Refills: 0 | Status: DISCONTINUED | COMMUNITY
Start: 2018-08-23 | End: 2019-03-12

## 2019-03-12 NOTE — PHYSICAL EXAM
[No Acute Distress] : no acute distress [Well Nourished] : well nourished [Well Developed] : well developed [Well-Appearing] : well-appearing [Normal Sclera/Conjunctiva] : normal sclera/conjunctiva [PERRL] : pupils equal round and reactive to light [Normal Oropharynx] : the oropharynx was normal [Normal TMs] : both tympanic membranes were normal [Neck Supple] : was supple [No Respiratory Distress] : no respiratory distress  [Normal Rate] : normal rate  [Regular Rhythm] : with a regular rhythm [Normal S1, S2] : normal S1 and S2 [No Murmur] : no murmur heard [No Edema] : there was no peripheral edema [Soft] : abdomen soft [Non Tender] : non-tender [Normal Bowel Sounds] : normal bowel sounds [No CVA Tenderness] : no CVA  tenderness [Grossly Normal Strength/Tone] : grossly normal strength/tone [No Rash] : no rash [Alert and Oriented x3] : oriented to person, place, and time [de-identified] : rhonchi/wheezing right lower lung field, lungs otherwise clear

## 2019-03-12 NOTE — REVIEW OF SYSTEMS
[Cough] : cough [Dyspnea on Exertion] : dyspnea on exertion [Anxiety] : anxiety [Negative] : Heme/Lymph

## 2019-03-12 NOTE — ASSESSMENT
[High Risk Surgery - Intraperitoneal, Intrathoracic or Supringuinal Vascular Procedures] : High Risk Surgery - Intraperitoneal, Intrathoracic or Supringuinal Vascular Procedures - No (0) [Ischemic Heart Disease] : Ischemic Heart Disease - No (0) [Congestive Heart Failure] : Congestive Heart Failure - No (0) [Prior Cerebrovascular Accident or TIA] : Prior Cerebrovascular Accident or TIA - No (0) [Creatinine >= 2mg/dL (1 Point)] : Creatinine >= 2mg/dL - No (0) [Insulin-dependent Diabetic (1 Point)] : Insulin-dependent Diabetic - No (0) [0] : 0 , RCRI Class: I, Risk of Post-Op Cardiac Complications: 0.4%, Procedure Risk: Low-Risk [Patient Optimized for Surgery] : Patient optimized for surgery [Continue medications as is] : Continue current medications [As per surgery] : as per surgery [FreeTextEntry4] : \par At this time, there are no medical contraindications for the planned surgery and anesthesia. \par \par COPD: completing Prednisone taper for recent exacerbation, declines chest x-ray at this time, advise close pulmonary monitoring, c/w Trelegy, Pro-air inhaler when needed \par \par HTN: BP stable, c/w Olmesartan\par \par Anxiety/Depression: c/w Lexapro\par \par Hypothyroidism: stable, c/w Levothyroxine\par \par Please call with any questions. \par \par  [FreeTextEntry6] : Hold ASA/NSAIDS 1 week prior to surgery

## 2019-03-12 NOTE — RESULTS/DATA
[] : results reviewed [ECG Reviewed] : reviewed [No Acute Ischemia] : No acute ischemia [NSR] : normal sinus rhythm [Ventricular Rate___] : ventricular rate is [unfilled] beats per minute

## 2019-03-12 NOTE — HISTORY OF PRESENT ILLNESS
[No Pertinent Cardiac History] : no history of aortic stenosis, atrial fibrillation, coronary artery disease, recent myocardial infarction, or implantable device/pacemaker [COPD] : COPD [No Adverse Anesthesia Reaction] : no adverse anesthesia reaction in self or family member [Sleep Apnea] : no sleep apnea [Smoker] : not a smoker [Chronic Anticoagulation] : no chronic anticoagulation [Diabetes] : no diabetes [FreeTextEntry1] : Left parotidectomy, possible facial nerve reanimation [FreeTextEntry2] : 3/21/19 [FreeTextEntry3] : Dr. Titi Richardson [FreeTextEntry4] : \par 68 year old female presents for medical clearance. 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 She had biopsy of the mass and was diagnosed with recurrent adenoid cystic carcinoma. Has been scheduled for surgery. \par \par Has anxiety, on Lexapro, feeling anxious prior to surgery\par \par Has HTN, recently started on Olmesartan\par \par Has COPD with recent exacerbation, currently completing Prednisone taper, on Trelegy Ellipta\par Wheezing, cough have improved, has chronic sob with exertion secondary from COPD \par \par Has Hypothyroidism, on Levothyroxine\par  [FreeTextEntry6] : sob with exertion (secondary from COPD)\par otherwise no chest pain, palpitations, orthopnea, syncope

## 2019-03-13 ENCOUNTER — APPOINTMENT (OUTPATIENT)
Dept: PLASTIC SURGERY | Facility: CLINIC | Age: 69
End: 2019-03-13

## 2019-03-20 ENCOUNTER — TRANSCRIPTION ENCOUNTER (OUTPATIENT)
Age: 69
End: 2019-03-20

## 2019-03-20 NOTE — ASU PATIENT PROFILE, ADULT - PSH
Arthropathy  b/l knees (one in 2005 and other side in 2010)  Breast cancer  2002 right lumpectomy with post op chemotherapy and RT  Cancer  [id number] inserted for chemotherapy in 2002 and then removed when chemotherapy completed  Parotid mass  diagnosed in 2007 with excision of left parotid ("adenoid cystic carcinoma") with post op RT

## 2019-03-20 NOTE — ASU PATIENT PROFILE, ADULT - PMH
Alcohol abuse  quit 30 years ago  Anemia  during chemotherapy for right breast cancer in 2002  Anxiety    Breast cancer  diagnosed in 2002, had right lumpectomy (estrogen receptive positive, HER 2 positive 3) with post op chemotherapy and RT  Cancer  2019, recurrence of left parotid cancer  COPD (chronic obstructive pulmonary disease)    Hypertension  had been on short-term medication  Hypothyroid    Lung mass  2019  Parotid mass  diagnosed in 2007 ("adenoid cystic carcinoma) of left parotid -- had excision with post op RT

## 2019-03-21 ENCOUNTER — RESULT REVIEW (OUTPATIENT)
Age: 69
End: 2019-03-21

## 2019-03-21 ENCOUNTER — APPOINTMENT (OUTPATIENT)
Dept: OTOLARYNGOLOGY | Facility: HOSPITAL | Age: 69
End: 2019-03-21

## 2019-03-21 ENCOUNTER — INPATIENT (INPATIENT)
Facility: HOSPITAL | Age: 69
LOS: 1 days | Discharge: HOME | End: 2019-03-23
Attending: OTOLARYNGOLOGY | Admitting: OTOLARYNGOLOGY
Payer: MEDICARE

## 2019-03-21 VITALS
SYSTOLIC BLOOD PRESSURE: 141 MMHG | WEIGHT: 190.04 LBS | DIASTOLIC BLOOD PRESSURE: 91 MMHG | TEMPERATURE: 98 F | HEART RATE: 77 BPM | HEIGHT: 62 IN | OXYGEN SATURATION: 100 % | RESPIRATION RATE: 16 BRPM

## 2019-03-21 DIAGNOSIS — K11.9 DISEASE OF SALIVARY GLAND, UNSPECIFIED: Chronic | ICD-10-CM

## 2019-03-21 DIAGNOSIS — C80.1 MALIGNANT (PRIMARY) NEOPLASM, UNSPECIFIED: Chronic | ICD-10-CM

## 2019-03-21 DIAGNOSIS — C50.919 MALIGNANT NEOPLASM OF UNSPECIFIED SITE OF UNSPECIFIED FEMALE BREAST: Chronic | ICD-10-CM

## 2019-03-21 DIAGNOSIS — M12.9 ARTHROPATHY, UNSPECIFIED: Chronic | ICD-10-CM

## 2019-03-21 DIAGNOSIS — D49.0 NEOPLASM OF UNSPECIFIED BEHAVIOR OF DIGESTIVE SYSTEM: ICD-10-CM

## 2019-03-21 LAB
BASE EXCESS BLDA CALC-SCNC: -0.7 MMOL/L — SIGNIFICANT CHANGE UP
BASE EXCESS BLDA CALC-SCNC: -2.5 MMOL/L — SIGNIFICANT CHANGE UP
CA-I BLDA-SCNC: 1.22 MMOL/L — SIGNIFICANT CHANGE UP (ref 1.15–1.29)
CA-I BLDA-SCNC: 1.23 MMOL/L — SIGNIFICANT CHANGE UP (ref 1.15–1.29)
GLUCOSE BLDA-MCNC: 121 MG/DL — HIGH (ref 70–99)
GLUCOSE BLDA-MCNC: 90 MG/DL — SIGNIFICANT CHANGE UP (ref 70–99)
HCO3 BLDA-SCNC: 22 MMOL/L — SIGNIFICANT CHANGE UP (ref 22–26)
HCO3 BLDA-SCNC: 24 MMOL/L — SIGNIFICANT CHANGE UP (ref 22–26)
HCT VFR BLDA CALC: 34.6 % — SIGNIFICANT CHANGE UP (ref 34.5–46.5)
HCT VFR BLDA CALC: 35.4 % — SIGNIFICANT CHANGE UP (ref 34.5–46.5)
HGB BLDA-MCNC: 11.2 G/DL — LOW (ref 11.5–15.5)
HGB BLDA-MCNC: 11.5 G/DL — SIGNIFICANT CHANGE UP (ref 11.5–15.5)
PCO2 BLDA: 38 MMHG — SIGNIFICANT CHANGE UP (ref 32–48)
PCO2 BLDA: 40 MMHG — SIGNIFICANT CHANGE UP (ref 32–48)
PH BLDA: 7.38 PH — SIGNIFICANT CHANGE UP (ref 7.35–7.45)
PH BLDA: 7.39 PH — SIGNIFICANT CHANGE UP (ref 7.35–7.45)
PO2 BLDA: 130 MMHG — HIGH (ref 83–108)
PO2 BLDA: 144 MMHG — HIGH (ref 83–108)
POTASSIUM BLDA-SCNC: 3.9 MMOL/L — SIGNIFICANT CHANGE UP (ref 3.4–4.5)
POTASSIUM BLDA-SCNC: 4.2 MMOL/L — SIGNIFICANT CHANGE UP (ref 3.4–4.5)
RH IG SCN BLD-IMP: POSITIVE — SIGNIFICANT CHANGE UP
SAO2 % BLDA: 98.8 % — SIGNIFICANT CHANGE UP (ref 95–99)
SAO2 % BLDA: 99 % — SIGNIFICANT CHANGE UP (ref 95–99)
SODIUM BLDA-SCNC: 140 MMOL/L — SIGNIFICANT CHANGE UP (ref 136–146)
SODIUM BLDA-SCNC: 140 MMOL/L — SIGNIFICANT CHANGE UP (ref 136–146)

## 2019-03-21 PROCEDURE — 88305 TISSUE EXAM BY PATHOLOGIST: CPT | Mod: 26

## 2019-03-21 PROCEDURE — 88360 TUMOR IMMUNOHISTOCHEM/MANUAL: CPT | Mod: 26

## 2019-03-21 PROCEDURE — 88331 PATH CONSLTJ SURG 1 BLK 1SPC: CPT | Mod: 26

## 2019-03-21 PROCEDURE — 21044 REMOVAL OF JAW BONE LESION: CPT | Mod: GC,LT

## 2019-03-21 PROCEDURE — 88307 TISSUE EXAM BY PATHOLOGIST: CPT | Mod: 26

## 2019-03-21 PROCEDURE — 42425 EXCISE PAROTID GLAND/LESION: CPT | Mod: GC,LT

## 2019-03-21 RX ORDER — OXYCODONE AND ACETAMINOPHEN 5; 325 MG/1; MG/1
2 TABLET ORAL EVERY 6 HOURS
Qty: 0 | Refills: 0 | Status: DISCONTINUED | OUTPATIENT
Start: 2019-03-21 | End: 2019-03-22

## 2019-03-21 RX ORDER — ESCITALOPRAM OXALATE 10 MG/1
20 TABLET, FILM COATED ORAL DAILY
Qty: 0 | Refills: 0 | Status: DISCONTINUED | OUTPATIENT
Start: 2019-03-21 | End: 2019-03-23

## 2019-03-21 RX ORDER — LEVOTHYROXINE SODIUM 125 MCG
75 TABLET ORAL DAILY
Qty: 0 | Refills: 0 | Status: DISCONTINUED | OUTPATIENT
Start: 2019-03-21 | End: 2019-03-23

## 2019-03-21 RX ORDER — SODIUM CHLORIDE 9 MG/ML
1000 INJECTION, SOLUTION INTRAVENOUS
Qty: 0 | Refills: 0 | Status: DISCONTINUED | OUTPATIENT
Start: 2019-03-21 | End: 2019-03-22

## 2019-03-21 RX ORDER — HYDROMORPHONE HYDROCHLORIDE 2 MG/ML
0.5 INJECTION INTRAMUSCULAR; INTRAVENOUS; SUBCUTANEOUS
Qty: 0 | Refills: 0 | Status: DISCONTINUED | OUTPATIENT
Start: 2019-03-21 | End: 2019-03-23

## 2019-03-21 RX ORDER — ONDANSETRON 8 MG/1
4 TABLET, FILM COATED ORAL ONCE
Qty: 0 | Refills: 0 | Status: DISCONTINUED | OUTPATIENT
Start: 2019-03-21 | End: 2019-03-23

## 2019-03-21 RX ORDER — HYDROMORPHONE HYDROCHLORIDE 2 MG/ML
1 INJECTION INTRAMUSCULAR; INTRAVENOUS; SUBCUTANEOUS
Qty: 0 | Refills: 0 | Status: DISCONTINUED | OUTPATIENT
Start: 2019-03-21 | End: 2019-03-23

## 2019-03-21 RX ORDER — ACETAMINOPHEN 500 MG
650 TABLET ORAL EVERY 6 HOURS
Qty: 0 | Refills: 0 | Status: DISCONTINUED | OUTPATIENT
Start: 2019-03-21 | End: 2019-03-23

## 2019-03-21 RX ORDER — OXYCODONE AND ACETAMINOPHEN 5; 325 MG/1; MG/1
1 TABLET ORAL EVERY 6 HOURS
Qty: 0 | Refills: 0 | Status: DISCONTINUED | OUTPATIENT
Start: 2019-03-21 | End: 2019-03-22

## 2019-03-21 RX ADMIN — HYDROMORPHONE HYDROCHLORIDE 0.5 MILLIGRAM(S): 2 INJECTION INTRAMUSCULAR; INTRAVENOUS; SUBCUTANEOUS at 19:14

## 2019-03-21 RX ADMIN — SODIUM CHLORIDE 75 MILLILITER(S): 9 INJECTION, SOLUTION INTRAVENOUS at 21:05

## 2019-03-21 RX ADMIN — Medication 2.5 MILLIGRAM(S): at 19:15

## 2019-03-21 RX ADMIN — HYDROMORPHONE HYDROCHLORIDE 0.5 MILLIGRAM(S): 2 INJECTION INTRAMUSCULAR; INTRAVENOUS; SUBCUTANEOUS at 19:45

## 2019-03-21 RX ADMIN — Medication 650 MILLIGRAM(S): at 23:51

## 2019-03-22 ENCOUNTER — TRANSCRIPTION ENCOUNTER (OUTPATIENT)
Age: 69
End: 2019-03-22

## 2019-03-22 DIAGNOSIS — I95.9 HYPOTENSION, UNSPECIFIED: ICD-10-CM

## 2019-03-22 DIAGNOSIS — C07 MALIGNANT NEOPLASM OF PAROTID GLAND: ICD-10-CM

## 2019-03-22 DIAGNOSIS — J44.9 CHRONIC OBSTRUCTIVE PULMONARY DISEASE, UNSPECIFIED: ICD-10-CM

## 2019-03-22 DIAGNOSIS — E03.9 HYPOTHYROIDISM, UNSPECIFIED: ICD-10-CM

## 2019-03-22 PROCEDURE — 99223 1ST HOSP IP/OBS HIGH 75: CPT

## 2019-03-22 RX ORDER — OXYCODONE HYDROCHLORIDE 5 MG/1
5 TABLET ORAL EVERY 6 HOURS
Qty: 0 | Refills: 0 | Status: DISCONTINUED | OUTPATIENT
Start: 2019-03-22 | End: 2019-03-23

## 2019-03-22 RX ORDER — INFLUENZA VIRUS VACCINE 15; 15; 15; 15 UG/.5ML; UG/.5ML; UG/.5ML; UG/.5ML
0.5 SUSPENSION INTRAMUSCULAR ONCE
Qty: 0 | Refills: 0 | Status: DISCONTINUED | OUTPATIENT
Start: 2019-03-22 | End: 2019-03-23

## 2019-03-22 RX ORDER — OXYCODONE HYDROCHLORIDE 5 MG/1
1 TABLET ORAL
Qty: 12 | Refills: 0
Start: 2019-03-22 | End: 2019-03-24

## 2019-03-22 RX ORDER — SODIUM CHLORIDE 9 MG/ML
1000 INJECTION INTRAMUSCULAR; INTRAVENOUS; SUBCUTANEOUS ONCE
Qty: 0 | Refills: 0 | Status: COMPLETED | OUTPATIENT
Start: 2019-03-22 | End: 2019-03-22

## 2019-03-22 RX ORDER — OXYCODONE HYDROCHLORIDE 5 MG/1
10 TABLET ORAL ONCE
Qty: 0 | Refills: 0 | Status: DISCONTINUED | OUTPATIENT
Start: 2019-03-22 | End: 2019-03-22

## 2019-03-22 RX ORDER — OXYCODONE HYDROCHLORIDE 5 MG/1
10 TABLET ORAL EVERY 6 HOURS
Qty: 0 | Refills: 0 | Status: DISCONTINUED | OUTPATIENT
Start: 2019-03-22 | End: 2019-03-23

## 2019-03-22 RX ADMIN — OXYCODONE HYDROCHLORIDE 10 MILLIGRAM(S): 5 TABLET ORAL at 20:04

## 2019-03-22 RX ADMIN — Medication 650 MILLIGRAM(S): at 00:37

## 2019-03-22 RX ADMIN — Medication 650 MILLIGRAM(S): at 23:20

## 2019-03-22 RX ADMIN — OXYCODONE HYDROCHLORIDE 10 MILLIGRAM(S): 5 TABLET ORAL at 11:00

## 2019-03-22 RX ADMIN — SODIUM CHLORIDE 500 MILLILITER(S): 9 INJECTION INTRAMUSCULAR; INTRAVENOUS; SUBCUTANEOUS at 14:17

## 2019-03-22 RX ADMIN — OXYCODONE HYDROCHLORIDE 10 MILLIGRAM(S): 5 TABLET ORAL at 10:31

## 2019-03-22 RX ADMIN — OXYCODONE HYDROCHLORIDE 10 MILLIGRAM(S): 5 TABLET ORAL at 14:30

## 2019-03-22 RX ADMIN — ESCITALOPRAM OXALATE 20 MILLIGRAM(S): 10 TABLET, FILM COATED ORAL at 12:29

## 2019-03-22 RX ADMIN — OXYCODONE HYDROCHLORIDE 10 MILLIGRAM(S): 5 TABLET ORAL at 06:53

## 2019-03-22 RX ADMIN — Medication 650 MILLIGRAM(S): at 22:50

## 2019-03-22 RX ADMIN — OXYCODONE HYDROCHLORIDE 10 MILLIGRAM(S): 5 TABLET ORAL at 20:34

## 2019-03-22 RX ADMIN — OXYCODONE HYDROCHLORIDE 10 MILLIGRAM(S): 5 TABLET ORAL at 14:01

## 2019-03-22 RX ADMIN — OXYCODONE HYDROCHLORIDE 10 MILLIGRAM(S): 5 TABLET ORAL at 06:23

## 2019-03-22 RX ADMIN — Medication 75 MICROGRAM(S): at 06:23

## 2019-03-22 RX ADMIN — OXYCODONE HYDROCHLORIDE 10 MILLIGRAM(S): 5 TABLET ORAL at 01:22

## 2019-03-22 RX ADMIN — OXYCODONE HYDROCHLORIDE 10 MILLIGRAM(S): 5 TABLET ORAL at 00:52

## 2019-03-22 NOTE — PROGRESS NOTE ADULT - ASSESSMENT
67 y/o F with hx of left parotid adenocystic carcinoma s/p left parotidectomy, L facial nerve graft with left sural nerve, static reconstruction with alloderm, left upper lid gold weight, left lower lid canthopexy.  - PRN pain control  - HOB elevation  - drain care and education  - ambulate  - dvt ppx  - dispo per ENT    Plastic Surgery (pg [place]: [id number], NS: [phone])

## 2019-03-22 NOTE — DISCHARGE NOTE PROVIDER - NSDCCPCAREPLAN_GEN_ALL_CORE_FT
PRINCIPAL DISCHARGE DIAGNOSIS  Diagnosis: Cancer of parotid gland  Assessment and Plan of Treatment:

## 2019-03-22 NOTE — CONSULT NOTE ADULT - ASSESSMENT
68 W h/o BrCa s/p chemoRT, left parotid adenoid cystic carcinoma, s/p L parotidectomy. Hospitalist service consulted for hypotension.

## 2019-03-22 NOTE — CONSULT NOTE ADULT - SUBJECTIVE AND OBJECTIVE BOX
CHIEF COMPLAINT: Patient is a 68y old  Female who presents with a chief complaint of left parotidectomy (22 Mar 2019 08:09)      HPI: 68 W h/o BrCa s/p chemoRT, left parotid adenoid cystic carcinoma, s/p L parotidectomy. Hospitalist service consulted for hypotension. Patient reports no LH, dizziness, cp, or sob. She reports excruciating pain related to her surgery. She states she was urinating prior to becoming hypotensive.     She reports no h/o hypertension. Does not take anti-hypertensives. She states she recently finished a 12-day taper of prednisone for COPD because she was wheezing during a doctor's appointment.    Allergies    Augmentin (Rash)    Intolerances    HOME MEDICATIONS: [x] Reviewed  prednisone  levothyroxine  oxycodone  Lexapro  Trelegy Ellipta    MEDICATIONS  (STANDING):  escitalopram 20 milliGRAM(s) Oral daily  influenza   Vaccine 0.5 milliLiter(s) IntraMuscular once  levothyroxine 75 MICROGram(s) Oral daily    MEDICATIONS  (PRN):  acetaminophen   Tablet .. 650 milliGRAM(s) Oral every 6 hours PRN Mild Pain (1 - 3)  HYDROmorphone  Injectable 0.5 milliGRAM(s) IV Push every 10 minutes PRN Moderate Pain (4 - 6)  HYDROmorphone  Injectable 1 milliGRAM(s) IV Push every 10 minutes PRN Severe Pain (7 - 10)  ondansetron Injectable 4 milliGRAM(s) IV Push once PRN Nausea and/or Vomiting  oxyCODONE    IR 5 milliGRAM(s) Oral every 6 hours PRN Moderate Pain (4 - 6)  oxyCODONE    IR 10 milliGRAM(s) Oral every 6 hours PRN Severe Pain (7 - 10)      PAST MEDICAL & SURGICAL HISTORY:  Cancer: 2019, recurrence of left parotid cancer  Alcohol abuse: quit 30 years ago  Parotid mass: diagnosed in 2007 (&quot;adenoid cystic carcinoma) of left parotid -- had excision with post op RT  Breast cancer: diagnosed in 2002, had right lumpectomy (estrogen receptive positive, HER 2 positive 3) with post op chemotherapy and RT  Anemia: during chemotherapy for right breast cancer in 2002  Lung mass: 2019  Hypothyroid  Hypertension: had been on short-term medication  Anxiety  COPD (chronic obstructive pulmonary disease)  Cancer: Rsluv-[id number]-[id number] inserted for chemotherapy in 2002 and then removed when chemotherapy completed  Arthropathy: b/l knees (one in 2005 and other side in 2010)  Parotid mass: diagnosed in 2007 with excision of left parotid (&quot;adenoid cystic carcinoma&quot;) with post op RT  Breast cancer: 2002 right lumpectomy with post op chemotherapy and RT  [x ] Reviewed     SOCIAL HISTORY:  [x] Substance abuse: None reported  [x] Alcohol use: 30 years ago  [x] Tobacco: quit smoking 2 years ago    FAMILY HISTORY:  Family history of heart disease (Father)  [x] No pertinent family history in first degree relatives     REVIEW OF SYSTEMS:  Review of Systems:   CONSTITUTIONAL: No fever, weight loss, or fatigue  EYES: No eye pain, visual disturbances, or discharge  ENMT:  No difficulty hearing, tinnitus, vertigo; No sinus or throat pain  NECK: No pain or stiffness  BREASTS: No pain, masses, or nipple discharge  RESPIRATORY: No cough, wheezing, chills or hemoptysis; No shortness of breath  CARDIOVASCULAR: No chest pain, palpitations, dizziness, or leg swelling  GASTROINTESTINAL: No abdominal or epigastric pain. No nausea, vomiting, or hematemesis; No diarrhea or constipation. No melena or hematochezia.  GENITOURINARY: No dysuria, frequency, hematuria, or incontinence  NEUROLOGICAL: No headaches, memory loss, loss of strength, numbness, or tremors  SKIN: No itching, burning, rashes, or lesions   LYMPH NODES: No enlarged glands  ENDOCRINE: No heat or cold intolerance; No hair loss  MUSCULOSKELETAL: No joint pain or swelling; No muscle, back, or extremity pain  PSYCHIATRIC: No depression, anxiety, mood swings, or difficulty sleeping  HEME/LYMPH: No easy bruising, or bleeding gums  ALLERGY AND IMMUNOLOGIC: No hives or eczema    Vital Signs Last 24 Hrs  T(C): 36.9 (22 Mar 2019 18:14), Max: 36.9 (22 Mar 2019 08:06)  T(F): 98.4 (22 Mar 2019 18:14), Max: 98.5 (22 Mar 2019 08:06)  HR: 83 (22 Mar 2019 18:14) (66 - 86)  BP: 94/53 (22 Mar 2019 18:14) (86/46 - 170/78)  BP(mean): 97 (21 Mar 2019 21:00) (93 - 105)  RR: 18 (22 Mar 2019 18:14) (11 - 19)  SpO2: 94% (22 Mar 2019 18:14) (92% - 100%)    PHYSICAL EXAM:  GENERAL: In moderate distress due to pain, well-groomed, well-developed, lying in bed  HEAD:  Atraumatic, Normocephalic; s/p L parotidectomy, cheek and neck incisions c/d/i  EYES: EOMI, PERRLA, conjunctiva and sclera clear  ENMT: Moist mucous membranes  NECK: Supple, No JVD  RESPIRATORY: Clear to auscultation bilaterally; No rales, rhonchi, wheezing, or rubs  CARDIOVASCULAR: Regular rate and rhythm; No murmurs, rubs, or gallops  GASTROINTESTINAL: Soft, Nontender, Nondistended; Bowel sounds present  GENITOURINARY: Not examined  EXTREMITIES:  2+ Peripheral Pulses, No clubbing, cyanosis, or edema  NERVOUS SYSTEM:  Alert & Oriented X3; Moving all 4 extremities; No gross sensory deficits  HEME/LYMPH: No lymphadenopathy noted  SKIN: No rashes or lesions; Incisions C/D/I    LABS:                Microbiology     RADIOLOGY & ADDITIONAL STUDIES:    EKG:   Personally Reviewed:  [x] YES     Imaging:   Personally Reviewed:  [x] YES               [ ] Consultant(s) Notes Reviewed  [x] Care Discussed with Consultants/Other Providers:

## 2019-03-22 NOTE — PROGRESS NOTE ADULT - SUBJECTIVE AND OBJECTIVE BOX
Pt seen and examined at bedside. No acute events overnight. Pt tolerating PO diet, ambulating, and voiding. Pain well controlled. Pt unhappy about facial nerve sacrifice but understands its necessity. Pt reports intact sensation to chin, lip, tongue.     Vital Signs Last 24 Hrs  T(C): 36.9 (22 Mar 2019 08:06), Max: 36.9 (22 Mar 2019 08:06)  T(F): 98.5 (22 Mar 2019 08:06), Max: 98.5 (22 Mar 2019 08:06)  HR: 79 (22 Mar 2019 08:06) (66 - 86)  BP: 147/85 (22 Mar 2019 08:06) (138/81 - 170/78)  BP(mean): 97 (21 Mar 2019 21:00) (93 - 105)  RR: 18 (22 Mar 2019 08:06) (11 - 19)  SpO2: 94% (22 Mar 2019 08:06) (92% - 100%)    PE:  GEN: NAD, awake, alert   RESP: Breathing comfortably on RA   FACE: L cheek with bolster sutured in place   (+) v3 sensation intact  Neck incision c/d/i, no fluctuance   KOKO with SS outpu   L facial paralysis, full eye closure with effort Pt seen and examined at bedside. No acute events overnight. Pt tolerating PO diet, ambulating, and voiding. Pain well controlled. Pt reports intact sensation to chin, lip, tongue.     Vital Signs Last 24 Hrs  T(C): 36.9 (22 Mar 2019 08:06), Max: 36.9 (22 Mar 2019 08:06)  T(F): 98.5 (22 Mar 2019 08:06), Max: 98.5 (22 Mar 2019 08:06)  HR: 79 (22 Mar 2019 08:06) (66 - 86)  BP: 147/85 (22 Mar 2019 08:06) (138/81 - 170/78)  BP(mean): 97 (21 Mar 2019 21:00) (93 - 105)  RR: 18 (22 Mar 2019 08:06) (11 - 19)  SpO2: 94% (22 Mar 2019 08:06) (92% - 100%)    PE:  GEN: NAD, awake, alert   RESP: Breathing comfortably on RA   FACE: L cheek with bolster sutured in place   (+) v3 sensation intact  Neck incision c/d/i, no fluctuance   KOKO with SS outpu   L facial paralysis, full eye closure with effort

## 2019-03-22 NOTE — PROGRESS NOTE ADULT - SUBJECTIVE AND OBJECTIVE BOX
Plastic Surgery Progress Note (pg [facility]: [id number], NS: [phone])    SUBJECTIVE:  The patient was seen and examined this morning during rounds. No acute events overnight.    OBJECTIVE:     ** VITAL SIGNS / I&O's **    Vital Signs Last 24 Hrs  T(C): 36.3 (22 Mar 2019 01:51), Max: 36.7 (21 Mar 2019 23:08)  T(F): 97.3 (22 Mar 2019 01:51), Max: 98 (21 Mar 2019 23:08)  HR: 86 (22 Mar 2019 01:51) (66 - 86)  BP: 162/82 (22 Mar 2019 01:51) (138/81 - 170/78)  BP(mean): 97 (21 Mar 2019 21:00) (93 - 105)  RR: 17 (22 Mar 2019 01:51) (11 - 19)  SpO2: 92% (22 Mar 2019 01:51) (92% - 100%)      21 Mar 2019 07:01  -  22 Mar 2019 07:00  --------------------------------------------------------  IN:    lactated ringers.: 3 mL    lactated ringers.: 225 mL  Total IN: 228 mL    OUT:    Bulb: 80 mL    Indwelling Catheter - Urethral: 730 mL    Voided: 600 mL  Total OUT: 1410 mL    Total NET: -1182 mL          ** PHYSICAL EXAM **    -- CONSTITUTIONAL: Alert, NAD   -- HEENT: good left upper lid closure, no ectropion, pre-auricular incision c/d/i, no collection, sling dressing in place  -- NECK: incision intact, no collections, KOKO drain serosanguinous  -- PULM: non-labored respirations  -- NEURO: no left facial nerve function observed on exam, right facial nerve intact      ** MEDICATIONS **  MEDICATIONS  (STANDING):  escitalopram 20 milliGRAM(s) Oral daily  lactated ringers. 1000 milliLiter(s) (75 mL/Hr) IV Continuous <Continuous>  levothyroxine 75 MICROGram(s) Oral daily    MEDICATIONS  (PRN):  acetaminophen   Tablet .. 650 milliGRAM(s) Oral every 6 hours PRN Mild Pain (1 - 3)  HYDROmorphone  Injectable 0.5 milliGRAM(s) IV Push every 10 minutes PRN Moderate Pain (4 - 6)  HYDROmorphone  Injectable 1 milliGRAM(s) IV Push every 10 minutes PRN Severe Pain (7 - 10)  ondansetron Injectable 4 milliGRAM(s) IV Push once PRN Nausea and/or Vomiting  oxyCODONE    IR 5 milliGRAM(s) Oral every 6 hours PRN Moderate Pain (4 - 6)  oxyCODONE    IR 10 milliGRAM(s) Oral every 6 hours PRN Severe Pain (7 - 10)

## 2019-03-22 NOTE — PROGRESS NOTE ADULT - ASSESSMENT
A/P: 68F POD1 s/p L revision parotidectomy, facial nerve sacrifice, sural nerve graft, gold weight placement   -care per primary  -reconsult OMFS PRN A/P: 68F POD1 s/p L revision parotidectomy, marginal mandibulectomy, facial nerve sacrifice, sural nerve graft, gold weight placement   -care per primary  -reconsult OMFS PRN  -f/u OMFS following d/c

## 2019-03-22 NOTE — CONSULT NOTE ADULT - PROBLEM SELECTOR RECOMMENDATION 4
No use of accessory muscles. No wheezing on exam. O2 sat acceptable (goal O2 sat for COPD patients is 89-93%). Continue off supplemental oxygen.

## 2019-03-22 NOTE — DISCHARGE NOTE PROVIDER - HOSPITAL COURSE
S/P left parotidectomy  and neck dissection with facial nerve sacrifice... S/P left parotidectomy  and neck dissection with facial nerve sacrifice with reconstruction by PRS. Hospital course was complicated by an episode of asymptomatic hypotension. Medicine was consulted and patient was cleared for discharge prior to DC. KOKO drain removed prior to DC. S/P left parotidectomy  and neck dissection with facial nerve sacrifice with reconstruction by PRS. Hospital course was complicated by an episode of asymptomatic hypotension. Medicine was consulted and recommended endocrinology evaluation. Endocrinology started patient on 5mg prednisone due to concern for adrenal insufficiency. Patient  discharged home and will follow up with PRS, ENT and endocrine outpatient.

## 2019-03-22 NOTE — PROGRESS NOTE ADULT - SUBJECTIVE AND OBJECTIVE BOX
Pt seen and examined at bedside. No acute events overnight.     T(C): 36.9 (03-22-19 @ 08:06), Max: 36.9 (03-22-19 @ 08:06)  HR: 79 (03-22-19 @ 08:06) (66 - 86)  BP: 147/85 (03-22-19 @ 08:06) (138/81 - 170/78)  RR: 18 (03-22-19 @ 08:06) (11 - 19)  SpO2: 94% (03-22-19 @ 08:06) (92% - 100%)    NAD, awake, alert   Breathing comfortably on RA   L cheek with bolster sutured in place   Neck incision c/d/i, no fluctuance   KOKO with SS output - 80cc   L facial paralysis, full eye closure with effort     A/P: 68F POD1 s/p L revision parotidectomy, facial nerve sacrifice, sural nerve graft, gold weight placement   -pain control   -diet   -home meds   -monitor KOKO output   -OOBTC/PT

## 2019-03-22 NOTE — DISCHARGE NOTE PROVIDER - NSDCFUADDINST_GEN_ALL_CORE_FT
Okay to bathe from neck down  resume home meds  resume regular diet  leave xeroform in place until your postoperative visit    Please follow up with Dr. Campos as directed.  Please follow up with Dr. Richardson as directed. Call [phone] with questions resume home meds  resume regular diet  leave xeroform in place until your postoperative visit    Please follow up with Dr. Campos as directed.  Please follow up with Dr. Richardson as directed. Call [phone] with questions    Please follow up with your PMD in 4 weeks for repeat TSH testing. Your levothyroxine dose was adjusted to 50 mcg based on thyroid function testing during your inpatient stay. resume home meds  resume regular diet  leave xeroform in place until your postoperative visit    Please follow up with Dr. Campos as directed.  Please follow up with Dr. Richardson as directed. Call [phone] with questions    Please follow up with your PMD in 4 weeks for repeat TSH testing. Your levothyroxine dose was adjusted to 50 mcg based on thyroid function testing during your inpatient stay.    Start taking 5 mg prednisone daily for adrenal insufficiency. You will need to follow up with endocrinology outpatient in 3 weeks. Please call [phone] to schedule an appointment

## 2019-03-22 NOTE — CONSULT NOTE ADULT - PROBLEM SELECTOR RECOMMENDATION 9
SBP from peak of 160s this AM to 80s-100s at present. No tachycardia. Improved slightly with fluids. Patient reports no localizing sx other than facial pain from her surgery. VBG reviewed and without gross abnormalities.    DDx includes hypotension related to opioid use vs due to pain vs orthostasis/dehydration. Lower suspicion for sepsis without fever or other localizing sx. Adrenal insufficiency in setting of recent prednisone use is also a possibility, but patient underwent taper.    -check orthostatic VS, if positive, would bolus an additional liter of IVNS and maintain NS at 75cc/hr  -minimize opioid use or reduce doses of opioids used  -check CBC, chemistry  -trend VS  -obtain ECG  -check 8AM cortisol if BP remains hypotensive; consider stress dose steroids (hydrocortisone 100mg IV x1) if patient becomes symptomatic (LH, dizzy). Would also obtain endocrine consult if this occurs.

## 2019-03-22 NOTE — DISCHARGE NOTE PROVIDER - CARE PROVIDER_API CALL
Titi Richardson)  [facility]; Otolaryngology  [address] [place] [address]  Phone: [phone]  Fax: [phone]  Follow Up Time:

## 2019-03-23 ENCOUNTER — TRANSCRIPTION ENCOUNTER (OUTPATIENT)
Age: 69
End: 2019-03-23

## 2019-03-23 VITALS
SYSTOLIC BLOOD PRESSURE: 113 MMHG | HEART RATE: 84 BPM | TEMPERATURE: 98 F | OXYGEN SATURATION: 95 % | DIASTOLIC BLOOD PRESSURE: 65 MMHG | RESPIRATION RATE: 16 BRPM

## 2019-03-23 DIAGNOSIS — E27.40 UNSPECIFIED ADRENOCORTICAL INSUFFICIENCY: ICD-10-CM

## 2019-03-23 LAB
ANION GAP SERPL CALC-SCNC: 11 MMO/L — SIGNIFICANT CHANGE UP (ref 7–14)
BUN SERPL-MCNC: 13 MG/DL — SIGNIFICANT CHANGE UP (ref 7–23)
CALCIUM SERPL-MCNC: 9 MG/DL — SIGNIFICANT CHANGE UP (ref 8.4–10.5)
CHLORIDE SERPL-SCNC: 105 MMOL/L — SIGNIFICANT CHANGE UP (ref 98–107)
CO2 SERPL-SCNC: 26 MMOL/L — SIGNIFICANT CHANGE UP (ref 22–31)
CORTIS SERPL-MCNC: 0.5 UG/DL — LOW (ref 2.7–18.4)
CORTIS SERPL-MCNC: 0.5 UG/DL — LOW (ref 2.7–18.4)
CORTIS SERPL-MCNC: 6.5 UG/DL — SIGNIFICANT CHANGE UP (ref 2.7–18.4)
CREAT SERPL-MCNC: 0.82 MG/DL — SIGNIFICANT CHANGE UP (ref 0.5–1.3)
GLUCOSE SERPL-MCNC: 77 MG/DL — SIGNIFICANT CHANGE UP (ref 70–99)
HCT VFR BLD CALC: 36.9 % — SIGNIFICANT CHANGE UP (ref 34.5–45)
HGB BLD-MCNC: 11.3 G/DL — LOW (ref 11.5–15.5)
MAGNESIUM SERPL-MCNC: 1.8 MG/DL — SIGNIFICANT CHANGE UP (ref 1.6–2.6)
MCHC RBC-ENTMCNC: 27.4 PG — SIGNIFICANT CHANGE UP (ref 27–34)
MCHC RBC-ENTMCNC: 30.6 % — LOW (ref 32–36)
MCV RBC AUTO: 89.6 FL — SIGNIFICANT CHANGE UP (ref 80–100)
NRBC # FLD: 0 K/UL — LOW (ref 25–125)
PHOSPHATE SERPL-MCNC: 2.9 MG/DL — SIGNIFICANT CHANGE UP (ref 2.5–4.5)
PLATELET # BLD AUTO: 157 K/UL — SIGNIFICANT CHANGE UP (ref 150–400)
PMV BLD: 10 FL — SIGNIFICANT CHANGE UP (ref 7–13)
POTASSIUM SERPL-MCNC: 3.8 MMOL/L — SIGNIFICANT CHANGE UP (ref 3.5–5.3)
POTASSIUM SERPL-SCNC: 3.8 MMOL/L — SIGNIFICANT CHANGE UP (ref 3.5–5.3)
RBC # BLD: 4.12 M/UL — SIGNIFICANT CHANGE UP (ref 3.8–5.2)
RBC # FLD: 13.8 % — SIGNIFICANT CHANGE UP (ref 10.3–14.5)
SODIUM SERPL-SCNC: 142 MMOL/L — SIGNIFICANT CHANGE UP (ref 135–145)
T3 SERPL-MCNC: 92.2 NG/DL — SIGNIFICANT CHANGE UP (ref 80–200)
T4 FREE SERPL-MCNC: 2.2 NG/DL — HIGH (ref 0.9–1.8)
TSH SERPL-MCNC: 2.42 UIU/ML — SIGNIFICANT CHANGE UP (ref 0.27–4.2)
WBC # BLD: 10.12 K/UL — SIGNIFICANT CHANGE UP (ref 3.8–10.5)
WBC # FLD AUTO: 10.12 K/UL — SIGNIFICANT CHANGE UP (ref 3.8–10.5)

## 2019-03-23 PROCEDURE — 99233 SBSQ HOSP IP/OBS HIGH 50: CPT

## 2019-03-23 PROCEDURE — 99222 1ST HOSP IP/OBS MODERATE 55: CPT | Mod: GC

## 2019-03-23 RX ORDER — COSYNTROPIN 0.25 MG/ML
0.25 INJECTION, SOLUTION INTRAVENOUS ONCE
Qty: 0 | Refills: 0 | Status: COMPLETED | OUTPATIENT
Start: 2019-03-23 | End: 2019-03-23

## 2019-03-23 RX ORDER — LEVOTHYROXINE SODIUM 125 MCG
1 TABLET ORAL
Qty: 30 | Refills: 1
Start: 2019-03-23 | End: 2019-05-21

## 2019-03-23 RX ORDER — LEVOTHYROXINE SODIUM 125 MCG
1 TABLET ORAL
Qty: 0 | Refills: 0 | COMMUNITY

## 2019-03-23 RX ORDER — DEXAMETHASONE 0.5 MG/5ML
4 ELIXIR ORAL ONCE
Qty: 0 | Refills: 0 | Status: DISCONTINUED | OUTPATIENT
Start: 2019-03-23 | End: 2019-03-23

## 2019-03-23 RX ORDER — LEVOTHYROXINE SODIUM 125 MCG
75 TABLET ORAL DAILY
Qty: 0 | Refills: 0 | Status: DISCONTINUED | OUTPATIENT
Start: 2019-03-23 | End: 2019-03-23

## 2019-03-23 RX ORDER — SODIUM CHLORIDE 9 MG/ML
1000 INJECTION INTRAMUSCULAR; INTRAVENOUS; SUBCUTANEOUS ONCE
Qty: 0 | Refills: 0 | Status: COMPLETED | OUTPATIENT
Start: 2019-03-23 | End: 2019-03-23

## 2019-03-23 RX ADMIN — ESCITALOPRAM OXALATE 20 MILLIGRAM(S): 10 TABLET, FILM COATED ORAL at 14:14

## 2019-03-23 RX ADMIN — OXYCODONE HYDROCHLORIDE 10 MILLIGRAM(S): 5 TABLET ORAL at 19:19

## 2019-03-23 RX ADMIN — COSYNTROPIN 0.25 MILLIGRAM(S): 0.25 INJECTION, SOLUTION INTRAVENOUS at 15:44

## 2019-03-23 RX ADMIN — OXYCODONE HYDROCHLORIDE 10 MILLIGRAM(S): 5 TABLET ORAL at 14:15

## 2019-03-23 RX ADMIN — OXYCODONE HYDROCHLORIDE 10 MILLIGRAM(S): 5 TABLET ORAL at 06:04

## 2019-03-23 RX ADMIN — Medication 75 MICROGRAM(S): at 06:04

## 2019-03-23 RX ADMIN — SODIUM CHLORIDE 500 MILLILITER(S): 9 INJECTION INTRAMUSCULAR; INTRAVENOUS; SUBCUTANEOUS at 10:14

## 2019-03-23 RX ADMIN — OXYCODONE HYDROCHLORIDE 10 MILLIGRAM(S): 5 TABLET ORAL at 19:49

## 2019-03-23 RX ADMIN — Medication 650 MILLIGRAM(S): at 10:14

## 2019-03-23 RX ADMIN — OXYCODONE HYDROCHLORIDE 10 MILLIGRAM(S): 5 TABLET ORAL at 06:34

## 2019-03-23 RX ADMIN — Medication 650 MILLIGRAM(S): at 11:00

## 2019-03-23 RX ADMIN — OXYCODONE HYDROCHLORIDE 10 MILLIGRAM(S): 5 TABLET ORAL at 15:15

## 2019-03-23 NOTE — CONSULT NOTE ADULT - SUBJECTIVE AND OBJECTIVE BOX
HPI:  This is a 69 y/o female who presents with h/o right breast cancer ("estrogen receptive positive, HER2 positive 3") in 2002 with subsequent lumpectomy with post op chemotherapy and RT, as well as, "adenoid cystic carcinoma" of the left parotid in 2007 with post op RT, hx of COPD presents for left parotidectomy.  Patient recently c/o "feeling sick." CXR performed with finding of lung lesion (told it was "benign"). Subsequent PET scan revealed lung lesion and eventual PET scan (done based on her history) revealed left parotid mass. Needle biopsy revealed recurrence of parotid cancer. Scheduled for left parotidectomy, radical resection mandible on 3-21-19 (04 Mar 2019 11:06)    Endocrine History:  Endocrinology was consulted to r/o adrenal insufficiency. Patient had a L revision parotidectomy, facial nerve sacrifice, sural nerve graft on 3/21. Had an episode of hypotension to 86/47 on 3/22. Improved with IVF. Following day BP was noted to be hypertensive. Patient reports has a recent dx of HTN and was started on a BP med a few months ago. Patient  reports was recently on steroids, prednisone taper that was started approx 2 weeks ago. Started at 60mg prednisone and finished taper 4 days prior at prednisone 10mg daily. Reports prior to that was on prednisone as well 3-4 mos ago for 1 week taper for COPD exacerbation. Patient also on chronic inhaled steroids- trelegy ellipta (fluticasone, umeclidinium, vilanterol) that patient has been taking >1 year. Patient never been told about a cortisol def in past. No recent weight changes, N/V, fatigue. Found today with AM cortisol 0.5. HR stable, no hypothermia, electrolytes WNL.     Reports was also dx with hypothyroidism in Nov 2017 and has been on levothyroxine 75mcg daily since then.      PAST MEDICAL & SURGICAL HISTORY:  Cancer: 2019, recurrence of left parotid cancer  Alcohol abuse: quit 30 years ago  Parotid mass: diagnosed in 2007 (&quot;adenoid cystic carcinoma) of left parotid -- had excision with post op RT  Breast cancer: diagnosed in 2002, had right lumpectomy (estrogen receptive positive, HER 2 positive 3) with post op chemotherapy and RT  Anemia: during chemotherapy for right breast cancer in 2002  Lung mass: 2019  Hypothyroid  Hypertension: had been on short-term medication  Anxiety  COPD (chronic obstructive pulmonary disease)  Cancer: Burbo-w-[id number] inserted for chemotherapy in 2002 and then removed when chemotherapy completed  Arthropathy: b/l knees (one in 2005 and other side in 2010)  Parotid mass: diagnosed in 2007 with excision of left parotid (&quot;adenoid cystic carcinoma&quot;) with post op RT  Breast cancer: 2002 right lumpectomy with post op chemotherapy and RT      FAMILY HISTORY:  Family history of heart disease (Father)      Social History: Former smoker, quit 3 years ago, no current alcohol use    Outpatient Medications:  · 	Lexapro 20 mg oral tablet: 1 tab(s) orally once a day  · 	levothyroxine 75 mcg (0.075 mg) oral capsule: 1 cap(s) orally once a day  · 	Trelegy Ellipta inhalation powder: 1 puff(s) inhaled once a day  · 	predniSONE: tapering dose. Patient to start 20mg 3/5  · 	Vitamin D3: 1 cap(s) orally once a day  · 	pro air  inhaler: 2 puff(s) inhaled , As Needed    MEDICATIONS  (STANDING):  cosyntropin Injectable 0.25 milliGRAM(s) IV Push once  escitalopram 20 milliGRAM(s) Oral daily  influenza   Vaccine 0.5 milliLiter(s) IntraMuscular once  levothyroxine 75 MICROGram(s) Oral daily    MEDICATIONS  (PRN):  acetaminophen   Tablet .. 650 milliGRAM(s) Oral every 6 hours PRN Mild Pain (1 - 3)  ondansetron Injectable 4 milliGRAM(s) IV Push once PRN Nausea and/or Vomiting  oxyCODONE    IR 5 milliGRAM(s) Oral every 6 hours PRN Moderate Pain (4 - 6)  oxyCODONE    IR 10 milliGRAM(s) Oral every 6 hours PRN Severe Pain (7 - 10)      Allergies    Augmentin (Rash)    Intolerances      Review of Systems:  Constitutional: No fever  Eyes: No blurry vision  Neuro: No tremors  HEENT: Has left sided facial pain  Cardiovascular: No chest pain, palpitations  Respiratory: No SOB, no cough  GI: No nausea, vomiting, abdominal pain  : No dysuria  Skin: no rash  Endocrine: no polyuria, polydipsia  Hem/lymph: no swelling  Osteoporosis: no fractures    ALL OTHER SYSTEMS REVIEWED AND NEGATIVE      PHYSICAL EXAM:  VITALS: T(C): 36.2 (03-23-19 @ 10:20)  T(F): 97.2 (03-23-19 @ 10:20), Max: 98.4 (03-22-19 @ 18:14)  HR: 81 (03-23-19 @ 10:20) (67 - 86)  BP: 104/71 (03-23-19 @ 10:20) (90/50 - 164/90)  RR:  (17 - 18)  SpO2:  (93% - 96%)  Wt(kg): --  GENERAL: NAD, well-groomed, well-developed  EYES: No proptosis, no lid lag, anicteric  HEENT:  surgical incision left side of face  THYROID: Normal size, no palpable nodules  RESPIRATORY: Clear to auscultation bilaterally; No rales, rhonchi, wheezing  CARDIOVASCULAR: Regular rate and rhythm; No murmurs; no peripheral edema  GI: Soft, nontender, non distended, normal bowel sounds  SKIN: Left foot swelling, in dressing  MUSCULOSKELETAL: Full range of motion, normal strength  NEURO: sensation intact, extraocular movements intact, no tremor  PSYCH: Alert and oriented x 3, normal affect, normal mood  CUSHING'S SIGNS: no striae                              11.3   10.12 )-----------( 157      ( 23 Mar 2019 07:50 )             36.9       03-23    142  |  105  |  13  ----------------------------<  77  3.8   |  26  |  0.82    EGFR if : 86  EGFR if non : 75    Ca    9.0      03-23  Mg     1.8     03-23  Phos  2.9     03-23        Thyroid Function Tests:  03-23 @ 07:50 TSH 2.42 FreeT4 2.20 T3 92.2 Anti TPO -- Anti Thyroglobulin Ab -- TSI --

## 2019-03-23 NOTE — DISCHARGE NOTE NURSING/CASE MANAGEMENT/SOCIAL WORK - NSDCDPATPORTLINK_GEN_ALL_CORE
You can access the [facility] Patient Portal, offered by [facility], by registering with the following website: [email or web address]

## 2019-03-23 NOTE — CONSULT NOTE ADULT - PROBLEM SELECTOR RECOMMENDATION 2
-Patient with hx of hypothyroidism currently on levothyroxine 75mcg daily.  -TSH 2.42, Ft4 2.2, and TT3 92.   -As per outpatient records TSH March 2018 6 and Nov 2018 1.89. No ft4.  -Would decrease dose of levothyroxine to 50mcg daily for now and will need repeat TFTs outpatient with PMD in 4 weeks for further dose titration. -Patient with hx of hypothyroidism currently on levothyroxine 75mcg daily.  -TSH 2.42, Ft4 2.2, and TT3 92. but dose has been stable with normal TFTs in the past on this dose. Can check Free T4 by equilibrium dialysis but this will take time to come back. Recommend continue current dose and repeat TFTs as outpatient.   -As per outpatient records TSH March 2018 6 and Nov 2018 1.89. No ft4.

## 2019-03-23 NOTE — CONSULT NOTE ADULT - PROBLEM SELECTOR RECOMMENDATION 9
-patient with episode of hypotension post-op resolved with IVF. On further w/u AM cortisol found to be 0.5.  -Of note, patient was recently on prednisone 2 week taper that finished 4 days prior. Hx of on/off prednisone use every few mos for past year for COPD exacerbations. Also on chronic inhaled steroids. This can all be causing suppression of cortisol.  -With pt currently HD stable, can hold off on steroids for now and to confirm dx of adrenal insuff, would do cosyntropin stimulation test. Draw cortisol and ACTH level first, then administer 0.25 mg cosyntropin IVP, followed by rechecking cortisol level exactly 45 min after adminstration. Result >18 after stimulation essentially rules out AI. If still low, then will likely need to be started on steroids  -If at any point patient becomes HD unstable, can start stress dose steroids of hydrocortisone 100mg X1 followed by hydrocortisone 50mg q8hrs. -patient with episode of hypotension post-op resolved with IVF. On further w/u AM cortisol found to be 0.5.  -Of note, patient was recently on prednisone 2 week taper that finished 4 days prior. Hx of on/off prednisone use every few mos for past year for COPD exacerbations. Also on chronic inhaled steroids. This can all be causing suppression of cortisol.  -With pt currently HD stable, can hold off on steroids for now and to confirm dx of adrenal insuff, would do cosyntropin stimulation test. Draw cortisol and ACTH level first, then administer 0.25 mg cosyntropin IVP, followed by rechecking cortisol level exactly 45 min after administration. Result >18 after stimulation essentially rules out AI. If still low, then will likely need to be started on steroids. Recommend prednisone 5mg daily with slow taper as outpatient.   -If at any point patient becomes HD unstable, can start stress dose steroids of hydrocortisone 100mg X1 followed by hydrocortisone 50mg q8hrs.

## 2019-03-23 NOTE — PROVIDER CONTACT NOTE (OTHER) - SITUATION
Patient's d/c paperwork says to reduce synthroid to 50mcg but pt states an attending came and told her to keep the 75mcg dose.

## 2019-03-23 NOTE — CONSULT NOTE ADULT - ATTENDING COMMENTS
Agree with assessment and plan as above by Dr. Busby. Reviewed all pertinent labs, glucose values, and imaging studies. Modifications made as indicated above.     Hema Mathur D.O  [phone]

## 2019-03-23 NOTE — PROGRESS NOTE ADULT - SUBJECTIVE AND OBJECTIVE BOX
Patient seen and examined, no acute events overnight. Episode of hypotension yesterday, resolved with bolus. Seen by medicine for hypotension.     T(C): 36.8 (03-23-19 @ 05:55), Max: 36.9 (03-22-19 @ 08:06)  HR: 69 (03-23-19 @ 05:55) (68 - 86)  BP: 164/90 (03-23-19 @ 05:55) (86/46 - 164/90)  RR: 17 (03-23-19 @ 05:55) (16 - 18)  SpO2: 95% (03-23-19 @ 05:55) (93% - 95%)  NAD, awake, alert   Breathing comfortably on RA   L cheek with bolster sutured in place   Neck incision c/d/i, no fluctuance   KOKO with SS output - 47.5  L facial paralysis, full eye closure with effort     A/P: 68F s/p L revision parotidectomy, facial nerve sacrifice, sural nerve graft, gold weight placement 3/21  - f/u med reccs – cortisol, EKG, CBC/BMP, orthostatics  -pain control   -diet   -home meds   -monitor KOKO output   -OOBTC/PT

## 2019-03-23 NOTE — PROGRESS NOTE ADULT - SUBJECTIVE AND OBJECTIVE BOX
Doing well.     Vital Signs Last 24 Hrs  T(C): 36.8 (23 Mar 2019 05:55), Max: 36.9 (22 Mar 2019 08:06)  T(F): 98.2 (23 Mar 2019 05:55), Max: 98.5 (22 Mar 2019 08:06)  HR: 69 (23 Mar 2019 05:55) (68 - 86)  BP: 164/90 (23 Mar 2019 05:55) (86/46 - 164/90)  RR: 17 (23 Mar 2019 05:55) (16 - 18)  SpO2: 95% (23 Mar 2019 05:55) (93% - 95%)    Left eye swelling stable, full eye closure and opening. Visual acuity grossly intact.  Left lip/cheek with bolster intact.  Sling intact with visible nasolabial crease  KOKO serosang

## 2019-03-23 NOTE — PROGRESS NOTE ADULT - ASSESSMENT
s/p parotidectomy with sacrifice of facial nerve trunk, reconstruction with gold weight to left upper lid, static sling lower face, sural nerve cable grafts.    - remove KOKO prior to d/c.  - Dc per ENT.

## 2019-03-23 NOTE — CONSULT NOTE ADULT - ASSESSMENT
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 Has recently been on prednisone and on chronic inhaled steroid. Endocrine consulted for r/o adrenal insufficiency for a low cortisol.

## 2019-03-23 NOTE — PROGRESS NOTE ADULT - SUBJECTIVE AND OBJECTIVE BOX
Patient is a 68y old  Female who presents with a chief complaint of left parotidectomy (22 Mar 2019 08:09)        SUBJECTIVE / OVERNIGHT EVENTS: Pt has been up and out of bed, ambulating to the bathroom without lightheadedness. Aside from facial/neck pain, no acute complaint. Pt would like to be discharged.      MEDICATIONS  (STANDING):  escitalopram 20 milliGRAM(s) Oral daily  influenza   Vaccine 0.5 milliLiter(s) IntraMuscular once  levothyroxine 75 MICROGram(s) Oral daily    MEDICATIONS  (PRN):  acetaminophen   Tablet .. 650 milliGRAM(s) Oral every 6 hours PRN Mild Pain (1 - 3)  HYDROmorphone  Injectable 0.5 milliGRAM(s) IV Push every 10 minutes PRN Moderate Pain (4 - 6)  HYDROmorphone  Injectable 1 milliGRAM(s) IV Push every 10 minutes PRN Severe Pain (7 - 10)  ondansetron Injectable 4 milliGRAM(s) IV Push once PRN Nausea and/or Vomiting  oxyCODONE    IR 5 milliGRAM(s) Oral every 6 hours PRN Moderate Pain (4 - 6)  oxyCODONE    IR 10 milliGRAM(s) Oral every 6 hours PRN Severe Pain (7 - 10)      Vital Signs Last 24 Hrs  T(C): 36.2 (23 Mar 2019 10:20), Max: 36.9 (22 Mar 2019 18:14)  T(F): 97.2 (23 Mar 2019 10:20), Max: 98.4 (22 Mar 2019 18:14)  HR: 81 (23 Mar 2019 10:20) (67 - 86)  BP: 104/71 (23 Mar 2019 10:20) (86/46 - 164/90)  BP(mean): --  RR: 17 (23 Mar 2019 10:20) (16 - 18)  SpO2: 96% (23 Mar 2019 10:20) (93% - 96%)  CAPILLARY BLOOD GLUCOSE        I&O's Summary    22 Mar 2019 07:01  -  23 Mar 2019 07:00  --------------------------------------------------------  IN: 1530 mL / OUT: 1047.5 mL / NET: 482.5 mL    23 Mar 2019 07:01  -  23 Mar 2019 11:56  --------------------------------------------------------  IN: 1240 mL / OUT: 7.5 mL / NET: 1232.5 mL          PHYSICAL EXAM  GENERAL: NAD, well-developed  HEAD:  Atraumatic, Normocephalic  EYES: EOMI, PERRLA, conjunctiva and sclera clear  CHEST/LUNG: Clear to auscultation bilaterally; No wheeze  HEART: Regular rate and rhythm; No murmurs, rubs, or gallops  ABDOMEN: Soft, Nontender, Nondistended; Bowel sounds present  EXTREMITIES:  2+ Peripheral Pulses, No clubbing, cyanosis, or edema    LABS:                        11.3   10.12 )-----------( 157      ( 23 Mar 2019 07:50 )             36.9     03-23    142  |  105  |  13  ----------------------------<  77  3.8   |  26  |  0.82    Ca    9.0      23 Mar 2019 07:50  Phos  2.9     03-23  Mg     1.8     03-23

## 2019-03-23 NOTE — PROGRESS NOTE ADULT - PROBLEM SELECTOR PLAN 1
AM corisol of 0.5mcg/dL highly suggestive of perioperative adrenal insufficiency, and this could also explain the hypotension.   - At this time, recommend bolus of dexamethasone 4mg IV x 1; this will not affect serum cortisol assay, so is preferred over hydrocortisone in case an ACTH stimulation test is sought.   - Recommend formal Endocrinology consult.

## 2019-03-23 NOTE — CONSULT NOTE ADULT - CONSULT REQUESTED BY NAME
Dr. Richardson
ENT
Airway patent, Nasal mucosa clear. Mouth with normal mucosa. Throat has no vesicles, no oropharyngeal exudates and uvula is midline.

## 2019-03-24 LAB — ACTH SER-ACNC: 6.9 PG/ML — LOW (ref 7.2–63.3)

## 2019-03-25 ENCOUNTER — INBOUND DOCUMENT (OUTPATIENT)
Age: 69
End: 2019-03-25

## 2019-03-26 LAB — SURGICAL PATHOLOGY STUDY: SIGNIFICANT CHANGE UP

## 2019-03-28 ENCOUNTER — RX RENEWAL (OUTPATIENT)
Age: 69
End: 2019-03-28

## 2019-04-03 ENCOUNTER — APPOINTMENT (OUTPATIENT)
Dept: OTOLARYNGOLOGY | Facility: CLINIC | Age: 69
End: 2019-04-03
Payer: MEDICARE

## 2019-04-03 VITALS
HEIGHT: 62.5 IN | WEIGHT: 185 LBS | BODY MASS INDEX: 33.19 KG/M2 | SYSTOLIC BLOOD PRESSURE: 150 MMHG | DIASTOLIC BLOOD PRESSURE: 82 MMHG | HEART RATE: 73 BPM

## 2019-04-03 PROCEDURE — 99024 POSTOP FOLLOW-UP VISIT: CPT

## 2019-04-03 NOTE — HISTORY OF PRESENT ILLNESS
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 pt states pain from graft and left cheek,ear 7/10.Pt taking oxycodone once daily with relief. Pt eating soft foods progressing to small bites.

## 2019-04-03 NOTE — REASON FOR VISIT
[Subsequent Evaluation] : a subsequent evaluation for [FreeTextEntry2] : s/p 3/21 parotidectomy with resection of recurrent carcinoma of the left parotid along with facial nerve sacrifice and marginal mandibulectomy

## 2019-04-05 ENCOUNTER — APPOINTMENT (OUTPATIENT)
Age: 69
End: 2019-04-05
Payer: MEDICARE

## 2019-04-05 PROCEDURE — 99024 POSTOP FOLLOW-UP VISIT: CPT

## 2019-04-11 NOTE — HISTORY OF PRESENT ILLNESS
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 Pt eating soft foods progressing to small bites. Patient c/o ear numbness. Patient is concern about her facial nerve function recovery. Patient has normal vision and no c/o eye dryness.

## 2019-04-12 ENCOUNTER — APPOINTMENT (OUTPATIENT)
Dept: PLASTIC SURGERY | Facility: CLINIC | Age: 69
End: 2019-04-12
Payer: MEDICARE

## 2019-04-12 PROCEDURE — 99024 POSTOP FOLLOW-UP VISIT: CPT

## 2019-04-18 NOTE — PHYSICAL EXAM
[NI] : Normal [de-identified] : incision c/d/i healing well no erythema no sign of infection facial sling in good position\par Moderate amount of swelling, no facial nerve function [de-identified] : Left eye upper eyelid gold weight in good position with good eye closure, incision c/d/i healing well no erythema no sign of infection \par Visual accurately intact

## 2019-04-30 ENCOUNTER — APPOINTMENT (OUTPATIENT)
Dept: FAMILY MEDICINE | Facility: CLINIC | Age: 69
End: 2019-04-30
Payer: MEDICARE

## 2019-04-30 VITALS
WEIGHT: 185 LBS | DIASTOLIC BLOOD PRESSURE: 80 MMHG | TEMPERATURE: 98.9 F | OXYGEN SATURATION: 97 % | HEIGHT: 62.5 IN | SYSTOLIC BLOOD PRESSURE: 130 MMHG | RESPIRATION RATE: 16 BRPM | BODY MASS INDEX: 33.19 KG/M2 | HEART RATE: 72 BPM

## 2019-04-30 PROCEDURE — 99214 OFFICE O/P EST MOD 30 MIN: CPT

## 2019-04-30 NOTE — PHYSICAL EXAM
[No Acute Distress] : no acute distress [Well Nourished] : well nourished [Well Developed] : well developed [Normal Sclera/Conjunctiva] : normal sclera/conjunctiva [Supple] : supple [No Lymphadenopathy] : no lymphadenopathy [No Respiratory Distress] : no respiratory distress  [Clear to Auscultation] : lungs were clear to auscultation bilaterally [No Accessory Muscle Use] : no accessory muscle use [de-identified] : pnd congestion

## 2019-05-14 ENCOUNTER — APPOINTMENT (OUTPATIENT)
Dept: OTOLARYNGOLOGY | Facility: CLINIC | Age: 69
End: 2019-05-14

## 2019-06-07 ENCOUNTER — LABORATORY RESULT (OUTPATIENT)
Age: 69
End: 2019-06-07

## 2019-06-07 ENCOUNTER — APPOINTMENT (OUTPATIENT)
Dept: FAMILY MEDICINE | Facility: CLINIC | Age: 69
End: 2019-06-07
Payer: MEDICARE

## 2019-06-07 VITALS
DIASTOLIC BLOOD PRESSURE: 80 MMHG | SYSTOLIC BLOOD PRESSURE: 138 MMHG | RESPIRATION RATE: 16 BRPM | WEIGHT: 185 LBS | OXYGEN SATURATION: 97 % | HEIGHT: 62.5 IN | BODY MASS INDEX: 33.19 KG/M2 | HEART RATE: 74 BPM

## 2019-06-07 PROCEDURE — 99214 OFFICE O/P EST MOD 30 MIN: CPT

## 2019-06-07 RX ORDER — OXYCODONE 5 MG/1
5 TABLET ORAL
Qty: 12 | Refills: 0 | Status: DISCONTINUED | COMMUNITY
Start: 2019-03-22 | End: 2019-06-07

## 2019-06-07 RX ORDER — LEVOTHYROXINE SODIUM 0.07 MG/1
75 TABLET ORAL DAILY
Qty: 90 | Refills: 3 | Status: DISCONTINUED | COMMUNITY
Start: 2017-11-08 | End: 2019-06-07

## 2019-06-07 RX ORDER — PREDNISONE 5 MG/1
5 TABLET ORAL
Qty: 30 | Refills: 0 | Status: DISCONTINUED | COMMUNITY
Start: 2019-03-23 | End: 2019-06-07

## 2019-06-07 RX ORDER — PREDNISONE 20 MG/1
20 TABLET ORAL DAILY
Qty: 30 | Refills: 1 | Status: DISCONTINUED | COMMUNITY
Start: 2018-09-18 | End: 2019-06-07

## 2019-06-07 RX ORDER — AZITHROMYCIN 250 MG/1
250 TABLET, FILM COATED ORAL
Qty: 1 | Refills: 0 | Status: DISCONTINUED | COMMUNITY
Start: 2019-04-30 | End: 2019-06-07

## 2019-06-07 RX ORDER — OXYCODONE AND ACETAMINOPHEN 5; 325 MG/1; MG/1
5-325 TABLET ORAL
Qty: 20 | Refills: 0 | Status: DISCONTINUED | COMMUNITY
Start: 2019-03-28 | End: 2019-06-07

## 2019-06-10 LAB
ANION GAP SERPL CALC-SCNC: 13 MMOL/L
APPEARANCE: CLEAR
APTT BLD: 30.2 SEC
BASOPHILS # BLD AUTO: 0.07 K/UL
BASOPHILS NFR BLD AUTO: 0.8 %
BILIRUBIN URINE: NEGATIVE
BLOOD URINE: NEGATIVE
BUN SERPL-MCNC: 15 MG/DL
CALCIUM SERPL-MCNC: 9.7 MG/DL
CHLORIDE SERPL-SCNC: 101 MMOL/L
CO2 SERPL-SCNC: 26 MMOL/L
COLOR: NORMAL
CREAT SERPL-MCNC: 0.76 MG/DL
EOSINOPHIL # BLD AUTO: 0.35 K/UL
EOSINOPHIL NFR BLD AUTO: 4 %
GLUCOSE QUALITATIVE U: NEGATIVE
GLUCOSE SERPL-MCNC: 95 MG/DL
HCT VFR BLD CALC: 38.2 %
HGB BLD-MCNC: 12.1 G/DL
IMM GRANULOCYTES NFR BLD AUTO: 0.3 %
INR PPP: 0.97 RATIO
KETONES URINE: NEGATIVE
LEUKOCYTE ESTERASE URINE: ABNORMAL
LYMPHOCYTES # BLD AUTO: 1.74 K/UL
LYMPHOCYTES NFR BLD AUTO: 19.9 %
MAN DIFF?: NORMAL
MCHC RBC-ENTMCNC: 26.8 PG
MCHC RBC-ENTMCNC: 31.7 GM/DL
MCV RBC AUTO: 84.5 FL
MONOCYTES # BLD AUTO: 0.66 K/UL
MONOCYTES NFR BLD AUTO: 7.5 %
NEUTROPHILS # BLD AUTO: 5.9 K/UL
NEUTROPHILS NFR BLD AUTO: 67.5 %
NITRITE URINE: NEGATIVE
PH URINE: 6
PLATELET # BLD AUTO: 289 K/UL
POTASSIUM SERPL-SCNC: 4.5 MMOL/L
PROTEIN URINE: NEGATIVE
PT BLD: 11 SEC
RBC # BLD: 4.52 M/UL
RBC # FLD: 13.1 %
SODIUM SERPL-SCNC: 140 MMOL/L
SPECIFIC GRAVITY URINE: 1.01
T4 FREE SERPL-MCNC: 1.6 NG/DL
TSH SERPL-ACNC: 1.24 UIU/ML
UROBILINOGEN URINE: NORMAL
WBC # FLD AUTO: 8.75 K/UL

## 2019-06-10 NOTE — RESULTS/DATA
[] : results reviewed [ECG Reviewed] : reviewed [No Acute Ischemia] : No acute ischemia [NSR] : normal sinus rhythm

## 2019-06-10 NOTE — HISTORY OF PRESENT ILLNESS
[No Pertinent Cardiac History] : no history of aortic stenosis, atrial fibrillation, coronary artery disease, recent myocardial infarction, or implantable device/pacemaker [COPD] : COPD [No Adverse Anesthesia Reaction] : no adverse anesthesia reaction in self or family member [Sleep Apnea] : no sleep apnea [Smoker] : not a smoker [Chronic Anticoagulation] : no chronic anticoagulation [Diabetes] : no diabetes [FreeTextEntry1] : Left Brow Lift  [FreeTextEntry2] : 6/12/19 [FreeTextEntry3] : Dr. Blue Campos [FreeTextEntry4] : \par 68 year old female presents for medical clearance. 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 She had biopsy of the mass and was diagnosed with recurrent adenoid cystic carcinoma. Had surgery in March 2019. Has been seen by plastic surgery as she reports a change in vision to her left eye along with blurriness. Was advised to go for a brow lift.\par \par Has anxiety, on Lexapro, takes Alprazolam when needed\par \par Has HTN, on Olmesartan\par \par Has COPD with recent exacerbation, recently treated with Prednisone, on Trelegy Ellipta\par Has chronic sob with exertion secondary from COPD \par has f/u scheduled with pulmonologist \par \par Has Hypothyroidism, on Levothyroxine\par  [FreeTextEntry6] : sob with exertion (secondary from COPD)\par otherwise no chest pain, palpitations, orthopnea, syncope

## 2019-06-10 NOTE — PHYSICAL EXAM
[No Acute Distress] : no acute distress [Well Nourished] : well nourished [Well Developed] : well developed [No Respiratory Distress] : no respiratory distress  [Clear to Auscultation] : lungs were clear to auscultation bilaterally [Neck Supple] : was supple [Regular Rhythm] : with a regular rhythm [Normal Rate] : normal rate  [Normal S1, S2] : normal S1 and S2 [No Murmur] : no murmur heard [No Edema] : there was no peripheral edema [Soft] : abdomen soft [Non Tender] : non-tender [Normal Bowel Sounds] : normal bowel sounds [Alert and Oriented x3] : oriented to person, place, and time [No Rash] : no rash

## 2019-06-10 NOTE — ASSESSMENT
[Patient Optimized for Surgery] : Patient optimized for surgery [Continue medications as is] : Continue current medications [As per surgery] : as per surgery [FreeTextEntry4] : \par At this time, there are no medical contraindications for the planned surgery and anesthesia. \par \par Anxiety/Depression- c/w Lexapro, Alprazolam as directed when needed,  reviewed, Reference #: [id number]\par \par COPD- following with pulmonology, c/w Trelegy inhaler\par \par HTN- stable, c/w Olmesartan\par \par Hypothyroidism- stable, c/w Levothyroxine\par \par Please call with any questions. \par \par  [FreeTextEntry6] : Hold ASA/NSAIDS 1 week prior to surgery

## 2019-06-11 NOTE — ASU PATIENT PROFILE, ADULT - PSH
Arthropathy  b/l knees (one in 2005 and other side in 2010)  Breast cancer  2002 right lumpectomy with post op chemotherapy and RT  Cancer  Kimea-c-rwud inserted for chemotherapy in 2002 and then removed when chemotherapy completed  Mass of left parotid gland  Excision of parotid mass with left neck dissection & graft 3/2019  Parotid mass  diagnosed in 2007 with excision of left parotid ("adenoid cystic carcinoma") with post op RT

## 2019-06-11 NOTE — ASU PATIENT PROFILE, ADULT - PMH
Alcohol abuse  quit 30 years ago  Anemia  during chemotherapy for right breast cancer in 2002  Anxiety    Breast cancer  diagnosed in 2002, had right lumpectomy (estrogen receptive positive, HER 2 positive 3) with post op chemotherapy and RT  Cancer  2019, recurrence of left parotid cancer, s/p facial surgery 3/2019  COPD (chronic obstructive pulmonary disease)    Hypertension  had been on short-term medication  Hypothyroid    Lung mass  2019  Parotid mass  diagnosed in 2007 ("adenoid cystic carcinoma) of left parotid -- had excision with post op RT

## 2019-06-14 ENCOUNTER — RX RENEWAL (OUTPATIENT)
Age: 69
End: 2019-06-14

## 2019-07-12 ENCOUNTER — OUTPATIENT (OUTPATIENT)
Dept: OUTPATIENT SERVICES | Facility: HOSPITAL | Age: 69
LOS: 1 days | End: 2019-07-12
Payer: MEDICARE

## 2019-07-12 VITALS
OXYGEN SATURATION: 98 % | RESPIRATION RATE: 16 BRPM | SYSTOLIC BLOOD PRESSURE: 140 MMHG | HEIGHT: 61.5 IN | DIASTOLIC BLOOD PRESSURE: 80 MMHG | WEIGHT: 182.98 LBS | HEART RATE: 68 BPM | TEMPERATURE: 98 F

## 2019-07-12 DIAGNOSIS — C50.919 MALIGNANT NEOPLASM OF UNSPECIFIED SITE OF UNSPECIFIED FEMALE BREAST: Chronic | ICD-10-CM

## 2019-07-12 DIAGNOSIS — K11.9 DISEASE OF SALIVARY GLAND, UNSPECIFIED: Chronic | ICD-10-CM

## 2019-07-12 DIAGNOSIS — C80.1 MALIGNANT (PRIMARY) NEOPLASM, UNSPECIFIED: Chronic | ICD-10-CM

## 2019-07-12 DIAGNOSIS — I10 ESSENTIAL (PRIMARY) HYPERTENSION: ICD-10-CM

## 2019-07-12 DIAGNOSIS — H02.402 UNSPECIFIED PTOSIS OF LEFT EYELID: ICD-10-CM

## 2019-07-12 DIAGNOSIS — G51.0 BELL'S PALSY: ICD-10-CM

## 2019-07-12 DIAGNOSIS — M12.9 ARTHROPATHY, UNSPECIFIED: Chronic | ICD-10-CM

## 2019-07-12 DIAGNOSIS — Z01.818 ENCOUNTER FOR OTHER PREPROCEDURAL EXAMINATION: ICD-10-CM

## 2019-07-12 LAB
ALBUMIN SERPL ELPH-MCNC: 4.4 G/DL — SIGNIFICANT CHANGE UP (ref 3.3–5)
ALP SERPL-CCNC: 100 U/L — SIGNIFICANT CHANGE UP (ref 40–120)
ALT FLD-CCNC: 8 U/L — SIGNIFICANT CHANGE UP (ref 4–33)
ANION GAP SERPL CALC-SCNC: 12 MMO/L — SIGNIFICANT CHANGE UP (ref 7–14)
AST SERPL-CCNC: 14 U/L — SIGNIFICANT CHANGE UP (ref 4–32)
BILIRUB SERPL-MCNC: 0.2 MG/DL — SIGNIFICANT CHANGE UP (ref 0.2–1.2)
BUN SERPL-MCNC: 17 MG/DL — SIGNIFICANT CHANGE UP (ref 7–23)
CALCIUM SERPL-MCNC: 10 MG/DL — SIGNIFICANT CHANGE UP (ref 8.4–10.5)
CHLORIDE SERPL-SCNC: 103 MMOL/L — SIGNIFICANT CHANGE UP (ref 98–107)
CO2 SERPL-SCNC: 26 MMOL/L — SIGNIFICANT CHANGE UP (ref 22–31)
CREAT SERPL-MCNC: 0.86 MG/DL — SIGNIFICANT CHANGE UP (ref 0.5–1.3)
GLUCOSE SERPL-MCNC: 86 MG/DL — SIGNIFICANT CHANGE UP (ref 70–99)
HCT VFR BLD CALC: 39.7 % — SIGNIFICANT CHANGE UP (ref 34.5–45)
HGB BLD-MCNC: 12.6 G/DL — SIGNIFICANT CHANGE UP (ref 11.5–15.5)
MCHC RBC-ENTMCNC: 26.9 PG — LOW (ref 27–34)
MCHC RBC-ENTMCNC: 31.7 % — LOW (ref 32–36)
MCV RBC AUTO: 84.6 FL — SIGNIFICANT CHANGE UP (ref 80–100)
NRBC # FLD: 0 K/UL — SIGNIFICANT CHANGE UP (ref 0–0)
PLATELET # BLD AUTO: 268 K/UL — SIGNIFICANT CHANGE UP (ref 150–400)
PMV BLD: 10.6 FL — SIGNIFICANT CHANGE UP (ref 7–13)
POTASSIUM SERPL-MCNC: 3.9 MMOL/L — SIGNIFICANT CHANGE UP (ref 3.5–5.3)
POTASSIUM SERPL-SCNC: 3.9 MMOL/L — SIGNIFICANT CHANGE UP (ref 3.5–5.3)
PROT SERPL-MCNC: 7.6 G/DL — SIGNIFICANT CHANGE UP (ref 6–8.3)
RBC # BLD: 4.69 M/UL — SIGNIFICANT CHANGE UP (ref 3.8–5.2)
RBC # FLD: 12.9 % — SIGNIFICANT CHANGE UP (ref 10.3–14.5)
SODIUM SERPL-SCNC: 141 MMOL/L — SIGNIFICANT CHANGE UP (ref 135–145)
WBC # BLD: 6.75 K/UL — SIGNIFICANT CHANGE UP (ref 3.8–10.5)
WBC # FLD AUTO: 6.75 K/UL — SIGNIFICANT CHANGE UP (ref 3.8–10.5)

## 2019-07-12 PROCEDURE — 93010 ELECTROCARDIOGRAM REPORT: CPT

## 2019-07-12 RX ORDER — ESCITALOPRAM OXALATE 10 MG/1
1 TABLET, FILM COATED ORAL
Qty: 0 | Refills: 0 | DISCHARGE

## 2019-07-12 RX ORDER — SODIUM CHLORIDE 9 MG/ML
1000 INJECTION, SOLUTION INTRAVENOUS
Refills: 0 | Status: DISCONTINUED | OUTPATIENT
Start: 2019-07-24 | End: 2019-08-09

## 2019-07-12 RX ORDER — FLUTICASONE FUROATE, UMECLIDINIUM BROMIDE AND VILANTEROL TRIFENATATE 200; 62.5; 25 UG/1; UG/1; UG/1
1 POWDER RESPIRATORY (INHALATION)
Qty: 0 | Refills: 0 | DISCHARGE

## 2019-07-12 RX ORDER — CHOLECALCIFEROL (VITAMIN D3) 125 MCG
1 CAPSULE ORAL
Qty: 0 | Refills: 0 | DISCHARGE

## 2019-07-12 NOTE — H&P PST ADULT - RS GEN PE MLT RESP DETAILS PC
no rhonchi/respirations non-labored/clear to auscultation bilaterally/breath sounds equal/no rales/airway patent

## 2019-07-12 NOTE — H&P PST ADULT - NSICDXPASTSURGICALHX_GEN_ALL_CORE_FT
PAST SURGICAL HISTORY:  Arthropathy b/l knees (one in 2005 and other side in 2010)    Breast cancer 2002 right lumpectomy with post op chemotherapy and RT    Cancer Pkwqg-e-gozm inserted for chemotherapy in 2002 and then removed when chemotherapy completed    Parotid mass diagnosed in 2007 with excision of left parotid ("adenoid cystic carcinoma") with post op RT PAST SURGICAL HISTORY:  Arthropathy b/l knees (one in 2005 and other side in 2010)    Breast cancer 2002 right lumpectomy with post op chemotherapy and RT    Cancer Lkycb-g-bnre inserted for chemotherapy in 2002 and then removed when chemotherapy completed    Mass of left parotid gland Excision of parotid mass with left neck dissection & graft 3/2019    Parotid mass diagnosed in 2007 with excision of left parotid ("adenoid cystic carcinoma") with post op RT

## 2019-07-12 NOTE — H&P PST ADULT - HISTORY OF PRESENT ILLNESS
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 Patient recently c/o "feeling sick." CXR performed with finding of lung lesion (told it was "benign"). Subsequent PET scan revealed lung lesion and eventual PET scan (done based on her history) revealed left parotid mass. Needle biopsy revealed recurrence of parotid cancer. S/P left parotidectomy, radical resection mandible on 3-21-19. Pt developed left eye-lid drooping. Now scheduled for left direct brow lift on 7/24/19. 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 Patient recently c/o "feeling sick." CXR performed with finding of lung lesion (told it was "benign"). Subsequent PET scan revealed lung lesion and eventual PET scan (done based on her history) revealed left parotid mass. Needle biopsy revealed recurrence of parotid cancer. S/P left parotidectomy, radical resection mandible on 3-21-19. Pt developed left facial & eye brow drooping. Now scheduled for left direct brow lift on 7/24/19.

## 2019-07-12 NOTE — H&P PST ADULT - NSICDXPROBLEM_GEN_ALL_CORE_FT
PROBLEM DIAGNOSES  Problem: Drooping eyelid, left  Assessment and Plan: scheduled for left direct brow lift on 7/24/19  pending labs; preop instructions given & explained, pt verbalized understanding    Problem: HTN (hypertension)  Assessment and Plan: instructed to take Olmesartan AM of surgery

## 2019-07-12 NOTE — H&P PST ADULT - NSICDXPASTMEDICALHX_GEN_ALL_CORE_FT
PAST MEDICAL HISTORY:  Alcohol abuse quit 30 years ago    Anemia during chemotherapy for right breast cancer in 2002    Anxiety     Breast cancer diagnosed in 2002, had right lumpectomy (estrogen receptive positive, HER 2 positive 3) with post op chemotherapy and RT    Cancer 2019, recurrence of left parotid cancer, s/p facial surgery 3/2019    COPD (chronic obstructive pulmonary disease)     Hypertension had been on short-term medication    Hypothyroid     Lung mass 2019    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

## 2019-07-23 ENCOUNTER — TRANSCRIPTION ENCOUNTER (OUTPATIENT)
Age: 69
End: 2019-07-23

## 2019-07-24 ENCOUNTER — OUTPATIENT (OUTPATIENT)
Dept: OUTPATIENT SERVICES | Facility: HOSPITAL | Age: 69
LOS: 1 days | Discharge: HOME | End: 2019-07-24
Payer: MEDICARE

## 2019-07-24 VITALS
SYSTOLIC BLOOD PRESSURE: 133 MMHG | DIASTOLIC BLOOD PRESSURE: 59 MMHG | TEMPERATURE: 98 F | RESPIRATION RATE: 15 BRPM | OXYGEN SATURATION: 95 % | HEART RATE: 75 BPM

## 2019-07-24 VITALS
HEART RATE: 69 BPM | WEIGHT: 182.98 LBS | TEMPERATURE: 98 F | HEIGHT: 61.5 IN | SYSTOLIC BLOOD PRESSURE: 103 MMHG | OXYGEN SATURATION: 96 % | DIASTOLIC BLOOD PRESSURE: 62 MMHG | RESPIRATION RATE: 14 BRPM

## 2019-07-24 DIAGNOSIS — G51.0 BELL'S PALSY: ICD-10-CM

## 2019-07-24 DIAGNOSIS — M12.9 ARTHROPATHY, UNSPECIFIED: Chronic | ICD-10-CM

## 2019-07-24 DIAGNOSIS — C80.1 MALIGNANT (PRIMARY) NEOPLASM, UNSPECIFIED: Chronic | ICD-10-CM

## 2019-07-24 DIAGNOSIS — K11.9 DISEASE OF SALIVARY GLAND, UNSPECIFIED: Chronic | ICD-10-CM

## 2019-07-24 DIAGNOSIS — C50.919 MALIGNANT NEOPLASM OF UNSPECIFIED SITE OF UNSPECIFIED FEMALE BREAST: Chronic | ICD-10-CM

## 2019-07-24 PROCEDURE — 67900 REPAIR BROW DEFECT: CPT | Mod: LT

## 2019-07-24 NOTE — ASU DISCHARGE PLAN (ADULT/PEDIATRIC) - ASU DC SPECIAL INSTRUCTIONSFT
Apply bacitracin to incision twice per day.  you may shower normally, just do not scrub incision.  Apply lubricating eye ointment to the left eye at night before going to sleep.  Limit exposure of incision to sun.  Take ibuprofen as needed for pain

## 2019-07-24 NOTE — ASU PREOP CHECKLIST - COMMENTS
proair inhaler, Lexapro, olmesartan & famotidine @ 05:45 with sips of water trelegy inhal power, levothyroxine, Lexapro, olmesartan & famotidine @ 05:45 with sips of water

## 2019-07-24 NOTE — ASU DISCHARGE PLAN (ADULT/PEDIATRIC) - CARE PROVIDER_API CALL
Blue Campos)  Surgery  [facility]  [address], [address]  [place] [address]  Phone: [phone]  Fax: [phone]  Follow Up Time:

## 2019-07-30 PROBLEM — C80.1 MALIGNANT (PRIMARY) NEOPLASM, UNSPECIFIED: Chronic | Status: ACTIVE | Noted: 2019-03-04

## 2019-08-12 ENCOUNTER — APPOINTMENT (OUTPATIENT)
Dept: PLASTIC SURGERY | Facility: CLINIC | Age: 69
End: 2019-08-12

## 2019-08-21 ENCOUNTER — RX RENEWAL (OUTPATIENT)
Age: 69
End: 2019-08-21

## 2019-08-22 ENCOUNTER — RESULT REVIEW (OUTPATIENT)
Age: 69
End: 2019-08-22

## 2019-09-09 ENCOUNTER — TRANSCRIPTION ENCOUNTER (OUTPATIENT)
Age: 69
End: 2019-09-09

## 2019-09-11 ENCOUNTER — APPOINTMENT (OUTPATIENT)
Dept: OTOLARYNGOLOGY | Facility: CLINIC | Age: 69
End: 2019-09-11
Payer: MEDICARE

## 2019-09-11 PROCEDURE — 99214 OFFICE O/P EST MOD 30 MIN: CPT

## 2019-09-11 NOTE — HISTORY OF PRESENT ILLNESS
[de-identified] : s/p parotidectomy with resection of recurrent adenoid cystic carcinoma of left parotid. Recon facial reanimation and nerve grafting with . Pt had consultation with  regarding RT, pt refused tx. MRI imaging 8/15 [facility].  Pt states occasional left facial cramping. Pt drinks from straw unable to move left face. Denies pain or discomfort.

## 2019-09-11 NOTE — CONSULT LETTER
[Dear  ___] : Dear  [unfilled], [Courtesy Letter:] : I had the pleasure of seeing your patient, [unfilled], in my office today. [Please see my note below.] : Please see my note below. [Sincerely,] : Sincerely, [FreeTextEntry2] : Clyde Contreras MD ([place], [place]) [FreeTextEntry3] : Titi Richardson MD

## 2019-09-11 NOTE — REASON FOR VISIT
[Subsequent Evaluation] : a subsequent evaluation for [FreeTextEntry2] : s/p parotidectomy with resection of recurrent adenoid cystic carcinoma of left parotid.

## 2019-10-04 ENCOUNTER — EMERGENCY (EMERGENCY)
Facility: HOSPITAL | Age: 69
LOS: 0 days | Discharge: HOME | End: 2019-10-04
Attending: HOSPITALIST
Payer: MEDICARE

## 2019-10-04 VITALS
SYSTOLIC BLOOD PRESSURE: 160 MMHG | OXYGEN SATURATION: 100 % | RESPIRATION RATE: 18 BRPM | DIASTOLIC BLOOD PRESSURE: 88 MMHG | HEART RATE: 65 BPM | TEMPERATURE: 98 F

## 2019-10-04 VITALS — WEIGHT: 171.96 LBS | HEIGHT: 62 IN

## 2019-10-04 DIAGNOSIS — J44.9 CHRONIC OBSTRUCTIVE PULMONARY DISEASE, UNSPECIFIED: ICD-10-CM

## 2019-10-04 DIAGNOSIS — K11.9 DISEASE OF SALIVARY GLAND, UNSPECIFIED: Chronic | ICD-10-CM

## 2019-10-04 DIAGNOSIS — C50.919 MALIGNANT NEOPLASM OF UNSPECIFIED SITE OF UNSPECIFIED FEMALE BREAST: Chronic | ICD-10-CM

## 2019-10-04 DIAGNOSIS — R10.32 LEFT LOWER QUADRANT PAIN: ICD-10-CM

## 2019-10-04 DIAGNOSIS — R10.9 UNSPECIFIED ABDOMINAL PAIN: ICD-10-CM

## 2019-10-04 DIAGNOSIS — C80.1 MALIGNANT (PRIMARY) NEOPLASM, UNSPECIFIED: Chronic | ICD-10-CM

## 2019-10-04 DIAGNOSIS — M12.9 ARTHROPATHY, UNSPECIFIED: Chronic | ICD-10-CM

## 2019-10-04 LAB
ALBUMIN SERPL ELPH-MCNC: 3.7 G/DL — SIGNIFICANT CHANGE UP (ref 3.3–5)
ALP SERPL-CCNC: 112 U/L — SIGNIFICANT CHANGE UP (ref 40–120)
ALT FLD-CCNC: 10 U/L — LOW (ref 12–78)
ANION GAP SERPL CALC-SCNC: 6 MMOL/L — SIGNIFICANT CHANGE UP (ref 5–17)
APPEARANCE UR: CLEAR — SIGNIFICANT CHANGE UP
AST SERPL-CCNC: 13 U/L — LOW (ref 15–37)
BILIRUB SERPL-MCNC: 0.2 MG/DL — SIGNIFICANT CHANGE UP (ref 0.2–1.2)
BILIRUB UR-MCNC: NEGATIVE — SIGNIFICANT CHANGE UP
BUN SERPL-MCNC: 20 MG/DL — SIGNIFICANT CHANGE UP (ref 7–23)
CALCIUM SERPL-MCNC: 9.3 MG/DL — SIGNIFICANT CHANGE UP (ref 8.5–10.1)
CHLORIDE SERPL-SCNC: 107 MMOL/L — SIGNIFICANT CHANGE UP (ref 96–108)
CO2 SERPL-SCNC: 27 MMOL/L — SIGNIFICANT CHANGE UP (ref 22–31)
COLOR SPEC: YELLOW — SIGNIFICANT CHANGE UP
CREAT SERPL-MCNC: 0.89 MG/DL — SIGNIFICANT CHANGE UP (ref 0.5–1.3)
DIFF PNL FLD: ABNORMAL
GLUCOSE SERPL-MCNC: 77 MG/DL — SIGNIFICANT CHANGE UP (ref 70–99)
GLUCOSE UR QL: NEGATIVE MG/DL — SIGNIFICANT CHANGE UP
KETONES UR-MCNC: NEGATIVE — SIGNIFICANT CHANGE UP
LEUKOCYTE ESTERASE UR-ACNC: ABNORMAL
LIDOCAIN IGE QN: 159 U/L — SIGNIFICANT CHANGE UP (ref 73–393)
NITRITE UR-MCNC: NEGATIVE — SIGNIFICANT CHANGE UP
PH UR: 6 — SIGNIFICANT CHANGE UP (ref 5–8)
POTASSIUM SERPL-MCNC: 3.3 MMOL/L — LOW (ref 3.5–5.3)
POTASSIUM SERPL-SCNC: 3.3 MMOL/L — LOW (ref 3.5–5.3)
PROT SERPL-MCNC: 7.3 GM/DL — SIGNIFICANT CHANGE UP (ref 6–8.3)
PROT UR-MCNC: NEGATIVE MG/DL — SIGNIFICANT CHANGE UP
SODIUM SERPL-SCNC: 140 MMOL/L — SIGNIFICANT CHANGE UP (ref 135–145)
SP GR SPEC: 1.01 — SIGNIFICANT CHANGE UP (ref 1.01–1.02)
UROBILINOGEN FLD QL: NEGATIVE MG/DL — SIGNIFICANT CHANGE UP

## 2019-10-04 PROCEDURE — 85025 COMPLETE CBC W/AUTO DIFF WBC: CPT

## 2019-10-04 PROCEDURE — 87086 URINE CULTURE/COLONY COUNT: CPT

## 2019-10-04 PROCEDURE — 83690 ASSAY OF LIPASE: CPT

## 2019-10-04 PROCEDURE — 74177 CT ABD & PELVIS W/CONTRAST: CPT | Mod: 26

## 2019-10-04 PROCEDURE — 81001 URINALYSIS AUTO W/SCOPE: CPT

## 2019-10-04 PROCEDURE — 99284 EMERGENCY DEPT VISIT MOD MDM: CPT

## 2019-10-04 PROCEDURE — 74177 CT ABD & PELVIS W/CONTRAST: CPT

## 2019-10-04 PROCEDURE — 36415 COLL VENOUS BLD VENIPUNCTURE: CPT

## 2019-10-04 PROCEDURE — 80053 COMPREHEN METABOLIC PANEL: CPT

## 2019-10-04 PROCEDURE — 99284 EMERGENCY DEPT VISIT MOD MDM: CPT | Mod: 25

## 2019-10-04 RX ORDER — POTASSIUM CHLORIDE 20 MEQ
40 PACKET (EA) ORAL ONCE
Refills: 0 | Status: COMPLETED | OUTPATIENT
Start: 2019-10-04 | End: 2019-10-04

## 2019-10-04 NOTE — ED PROVIDER NOTE - NS_ ATTENDINGSCRIBEDETAILS _ED_A_ED_FT
Keri Graf MD: The history, relevant review of systems, past medical and surgical history, medical decision making, and physical examination was documented by the scribe in my presence and I attest to the accuracy of the documentation.

## 2019-10-04 NOTE — ED ADULT NURSE NOTE - CHPI ED NUR SYMPTOMS NEG
no vomiting/no hematuria/no nausea/no diarrhea/no abdominal distension/no blood in stool/no burning urination/no chills/no fever/no dysuria

## 2019-10-04 NOTE — ED PROVIDER NOTE - NSFOLLOWUPINSTRUCTIONS_ED_ALL_ED_FT
Please follow up with gastroenterology.    Abdominal Pain    AMBULATORY CARE:    Abdominal pain can be dull, achy, or sharp. You may have pain in one area of your abdomen, or in your entire abdomen. Your pain may be caused by a condition such as constipation, food sensitivity or poisoning, infection, or a blockage. Abdominal pain can also be from a hernia, appendicitis, or an ulcer. Liver, gallbladder, or kidney conditions can also cause abdominal pain. The cause of your abdominal pain may be unknown.    Seek care immediately if:     You have new chest pain or shortness of breath.      You have pulsing pain in your upper abdomen or lower back that suddenly becomes constant.      Your pain is in the right lower abdominal area and worsens with movement.       You have a fever over 100.4°F (38°C) or shaking chills.       You are vomiting and cannot keep food or liquids down.       Your pain does not improve or gets worse over the next 8 to 12 hours.       You see blood in your vomit or bowel movements, or they look black and tarry.       Your skin or the whites of your eyes turn yellow.       You are a woman and have a large amount of vaginal bleeding that is not your monthly period.     Contact your healthcare provider if:     You have pain in your lower back.       You are a man and have pain in your testicles.      You have pain when you urinate.       You have questions or concerns about your condition or care.    Treatment for abdominal pain may include medicine to calm your stomach, prevent vomiting, or decrease pain.    Follow up with your healthcare provider as directed: Write down your questions so you remember to ask them during your visits.

## 2019-10-04 NOTE — ED PROVIDER NOTE - PATIENT PORTAL LINK FT
You can access the FollowMyHealth Patient Portal offered by [facility] by registering at the following website: [email or web address]. By joining [employer]’s FollowMyHealth portal, you will also be able to view your health information using other applications (apps) compatible with our system.

## 2019-10-04 NOTE — ED PROVIDER NOTE - OBJECTIVE STATEMENT
70 y/o female with a PMHx of anxiety, anemia, breast CA in remission, COPD, EtOH use, HTN, hypothyroid, left parotid cancer, presents to the ED c/o abd pain. Pain exacerbated with movement. Denies fever. Recent abx use and Prednisone for bronchitis. Allergies: Augmentin. No other complaints at this time.

## 2019-10-04 NOTE — ED ADULT TRIAGE NOTE - CHIEF COMPLAINT QUOTE
patient ambulated in with a steady gait. complains of LLQ pain and constipation. patient just finished a dose of prednisone. denies fevers. patient reports left facial droop is normal for her as she has cancer. no acute distress noted in triage.

## 2019-10-04 NOTE — ED ADULT NURSE NOTE - OBJECTIVE STATEMENT
Pt is a 69y female, A & O 4, VSS, presents to ED w/ lower left abdominal pain x 3 weeks, denies nausea, vomiting, states decreased bowel movements, no tenderness upon palpation. Pt states hx of cancer in jaw, left side facial droop related to surgery. Pt denies fevers, chills.

## 2019-10-05 LAB
CULTURE RESULTS: SIGNIFICANT CHANGE UP
SPECIMEN SOURCE: SIGNIFICANT CHANGE UP

## 2019-10-29 ENCOUNTER — RX RENEWAL (OUTPATIENT)
Age: 69
End: 2019-10-29

## 2019-11-10 ENCOUNTER — RX RENEWAL (OUTPATIENT)
Age: 69
End: 2019-11-10

## 2019-11-13 ENCOUNTER — RX RENEWAL (OUTPATIENT)
Age: 69
End: 2019-11-13

## 2019-11-19 ENCOUNTER — OUTPATIENT (OUTPATIENT)
Dept: OUTPATIENT SERVICES | Facility: HOSPITAL | Age: 69
LOS: 1 days | End: 2019-11-19
Payer: MEDICARE

## 2019-11-19 VITALS
RESPIRATION RATE: 16 BRPM | WEIGHT: 171.08 LBS | SYSTOLIC BLOOD PRESSURE: 128 MMHG | OXYGEN SATURATION: 98 % | HEART RATE: 68 BPM | TEMPERATURE: 98 F | DIASTOLIC BLOOD PRESSURE: 82 MMHG | HEIGHT: 62.5 IN

## 2019-11-19 DIAGNOSIS — R10.32 LEFT LOWER QUADRANT PAIN: ICD-10-CM

## 2019-11-19 DIAGNOSIS — M12.9 ARTHROPATHY, UNSPECIFIED: Chronic | ICD-10-CM

## 2019-11-19 DIAGNOSIS — C50.919 MALIGNANT NEOPLASM OF UNSPECIFIED SITE OF UNSPECIFIED FEMALE BREAST: Chronic | ICD-10-CM

## 2019-11-19 DIAGNOSIS — Z01.818 ENCOUNTER FOR OTHER PREPROCEDURAL EXAMINATION: ICD-10-CM

## 2019-11-19 DIAGNOSIS — C80.1 MALIGNANT (PRIMARY) NEOPLASM, UNSPECIFIED: Chronic | ICD-10-CM

## 2019-11-19 DIAGNOSIS — Z90.89 ACQUIRED ABSENCE OF OTHER ORGANS: Chronic | ICD-10-CM

## 2019-11-19 DIAGNOSIS — K11.9 DISEASE OF SALIVARY GLAND, UNSPECIFIED: Chronic | ICD-10-CM

## 2019-11-19 DIAGNOSIS — Z98.890 OTHER SPECIFIED POSTPROCEDURAL STATES: Chronic | ICD-10-CM

## 2019-11-19 DIAGNOSIS — R12 HEARTBURN: ICD-10-CM

## 2019-11-19 LAB
ANION GAP SERPL CALC-SCNC: 7 MMOL/L — SIGNIFICANT CHANGE UP (ref 5–17)
BASOPHILS # BLD AUTO: 0.06 K/UL — SIGNIFICANT CHANGE UP (ref 0–0.2)
BASOPHILS NFR BLD AUTO: 0.8 % — SIGNIFICANT CHANGE UP (ref 0–2)
BUN SERPL-MCNC: 19 MG/DL — SIGNIFICANT CHANGE UP (ref 7–23)
CALCIUM SERPL-MCNC: 9.6 MG/DL — SIGNIFICANT CHANGE UP (ref 8.5–10.1)
CHLORIDE SERPL-SCNC: 109 MMOL/L — HIGH (ref 96–108)
CO2 SERPL-SCNC: 26 MMOL/L — SIGNIFICANT CHANGE UP (ref 22–31)
CREAT SERPL-MCNC: 0.9 MG/DL — SIGNIFICANT CHANGE UP (ref 0.5–1.3)
EOSINOPHIL # BLD AUTO: 0.33 K/UL — SIGNIFICANT CHANGE UP (ref 0–0.5)
EOSINOPHIL NFR BLD AUTO: 4.3 % — SIGNIFICANT CHANGE UP (ref 0–6)
GLUCOSE SERPL-MCNC: 90 MG/DL — SIGNIFICANT CHANGE UP (ref 70–99)
HCT VFR BLD CALC: 39 % — SIGNIFICANT CHANGE UP (ref 34.5–45)
HGB BLD-MCNC: 12.5 G/DL — SIGNIFICANT CHANGE UP (ref 11.5–15.5)
IMM GRANULOCYTES NFR BLD AUTO: 0.1 % — SIGNIFICANT CHANGE UP (ref 0–1.5)
LYMPHOCYTES # BLD AUTO: 1.23 K/UL — SIGNIFICANT CHANGE UP (ref 1–3.3)
LYMPHOCYTES # BLD AUTO: 16.1 % — SIGNIFICANT CHANGE UP (ref 13–44)
MCHC RBC-ENTMCNC: 27.7 PG — SIGNIFICANT CHANGE UP (ref 27–34)
MCHC RBC-ENTMCNC: 32.1 GM/DL — SIGNIFICANT CHANGE UP (ref 32–36)
MCV RBC AUTO: 86.3 FL — SIGNIFICANT CHANGE UP (ref 80–100)
MONOCYTES # BLD AUTO: 0.57 K/UL — SIGNIFICANT CHANGE UP (ref 0–0.9)
MONOCYTES NFR BLD AUTO: 7.5 % — SIGNIFICANT CHANGE UP (ref 2–14)
NEUTROPHILS # BLD AUTO: 5.44 K/UL — SIGNIFICANT CHANGE UP (ref 1.8–7.4)
NEUTROPHILS NFR BLD AUTO: 71.2 % — SIGNIFICANT CHANGE UP (ref 43–77)
PLATELET # BLD AUTO: 265 K/UL — SIGNIFICANT CHANGE UP (ref 150–400)
POTASSIUM SERPL-MCNC: 4.7 MMOL/L — SIGNIFICANT CHANGE UP (ref 3.5–5.3)
POTASSIUM SERPL-SCNC: 4.7 MMOL/L — SIGNIFICANT CHANGE UP (ref 3.5–5.3)
RBC # BLD: 4.52 M/UL — SIGNIFICANT CHANGE UP (ref 3.8–5.2)
RBC # FLD: 13.1 % — SIGNIFICANT CHANGE UP (ref 10.3–14.5)
SODIUM SERPL-SCNC: 142 MMOL/L — SIGNIFICANT CHANGE UP (ref 135–145)
WBC # BLD: 7.64 K/UL — SIGNIFICANT CHANGE UP (ref 3.8–10.5)
WBC # FLD AUTO: 7.64 K/UL — SIGNIFICANT CHANGE UP (ref 3.8–10.5)

## 2019-11-19 PROCEDURE — 80048 BASIC METABOLIC PNL TOTAL CA: CPT

## 2019-11-19 PROCEDURE — 85025 COMPLETE CBC W/AUTO DIFF WBC: CPT

## 2019-11-19 PROCEDURE — 36415 COLL VENOUS BLD VENIPUNCTURE: CPT

## 2019-11-19 PROCEDURE — G0463: CPT | Mod: 25

## 2019-11-19 RX ORDER — FLUTICASONE FUROATE, UMECLIDINIUM BROMIDE AND VILANTEROL TRIFENATATE 200; 62.5; 25 UG/1; UG/1; UG/1
1 POWDER RESPIRATORY (INHALATION)
Qty: 0 | Refills: 0 | DISCHARGE

## 2019-11-19 NOTE — H&P PST ADULT - NSICDXPASTMEDICALHX_GEN_ALL_CORE_FT
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 PAST MEDICAL HISTORY:  Alcohol abuse quit 30 years ago    Anemia during chemotherapy for right breast cancer in 2002    Anxiety     Breast cancer diagnosed in 2002, had right lumpectomy (estrogen receptive positive, HER 2 positive 3) with post op chemotherapy and RT    Cancer 2019, recurrence of left parotid cancer, s/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

## 2019-11-19 NOTE — H&P PST ADULT - ENDOCRINE
LM to conf apt @ [facility] on 12/20 @ 8:45 check in @ 8:30, reviewed prep and location  
negative

## 2019-11-19 NOTE — H&P PST ADULT - NSICDXPASTSURGICALHX_GEN_ALL_CORE_FT
PAST SURGICAL HISTORY:  Arthropathy b/l knees (one in 2005 and other side in 2010)    Breast cancer 2002 right lumpectomy with post op chemotherapy and RT s/p lymph node dissection    Cancer Rdtiz-p-jdak inserted for chemotherapy in 2002 and then removed when chemotherapy completed    History of surgery left brow lift 7/2019    Mass of left parotid gland Excision of parotid mass with left neck dissection & graft 3/2019    Parotid mass diagnosed in 2007 with excision of left parotid ("adenoid cystic carcinoma") with post op RT    S/P tonsillectomy PAST SURGICAL HISTORY:  Arthropathy b/l knees (one in 2005 and other side in 2010)    Breast cancer 2002 right lumpectomy with post op chemotherapy and RT s/p lymph node dissection    Cancer Ncthb-s-reao inserted for chemotherapy in 2002 and then removed when chemotherapy completed    History of surgery left brow lift 7/2019    Mass of left parotid gland Excision of parotid mass with left neck dissection & graft 3/2019    Parotid mass diagnosed in 2007 with excision of left parotid ("adenoid cystic carcinoma") with post op RT    S/P tonsillectomy

## 2019-11-19 NOTE — H&P PST ADULT - HISTORY OF PRESENT ILLNESS
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 Patient recently c/o "feeling sick." CXR performed with finding of lung lesion (told it was "benign"). Subsequent PET scan revealed lung lesion and eventual PET scan (done based on her history) revealed left parotid mass. Needle biopsy revealed recurrence of parotid cancer. S/P left parotidectomy, radical resection mandible on 3-21-19. Pt developed left facial & eye brow drooping. Now scheduled for left direct brow lift on 7/24/19. 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 Patient recently c/o "feeling sick." CXR performed with finding of lung lesion (told it was "benign"). Subsequent PET scan revealed lung lesion and eventual PET scan (done based on her history) revealed left parotid mass. Needle biopsy revealed recurrence of parotid cancer. S/P left parotidectomy, radical resection mandible on 3-21-19.    Pt c/o heartburn and right neck pain denies change in bowel habits; she presents to PST for planned upper endoscopy and colonoscopy

## 2019-11-19 NOTE — H&P PST ADULT - ASSESSMENT
69 year old female presents to [facility] for planned upper endoscopy and colonoscopy  1.  Dr Kang   office  will instruct patient regarding bowel prep and  medication regimen on day of procedure.  2. Endoscopy booking will call patient the day before surgery for arrival instructions   3. NPO post midnight  4. CBC BMP EKG as per Dr Kang

## 2019-11-19 NOTE — H&P PST ADULT - NEGATIVE GASTROINTESTINAL SYMPTOMS
[facility] Hospital Medicine  History & Physical    Patient Name: Zander Boyer  MRN: [id number]  Admission Date: 2/10/2018  Attending Physician: Timo Kirkpatrick MD   Primary Care Provider: Primary Doctor No         Patient information was obtained from patient and ER records.     Subjective:     Principal Problem:Pleural effusion on right    Chief Complaint:   Chief Complaint   Patient presents with    Chest Pain     Sent here by urgent care and was told he had fluid in Rt lung        HPI: Zander Boyer is a 36 yo male with no significant medical or surgical history.  He is admitted to the service of hospital medicine with a diagnosis of right pleural effusion.  He presented to the ED today per advice from Urgent Care who told him he had fluid on his lung.  His symptoms began 1 1/2 weeks ago with right sided back pain he believed was a pulled muscle.  The pain began to wrap around to his right chest and he began having nonproductive cough and chills with fever with tmax 102F.  The pain is exacerbated by coughing and lying flat. The patient also endorses SOB when lying flat. He states that he experienced mild cold symptoms about 2 wks ago, but that he hasn't been sick otherwise. He denies HA, abdomen pain, n/v and diarrhea.       History reviewed. No pertinent past medical history.    History reviewed. No pertinent surgical history.    Review of patient's allergies indicates:  No Known Allergies    No current facility-administered medications on file prior to encounter.      No current outpatient prescriptions on file prior to encounter.     Family History     Problem Relation (Age of Onset)    No Known Problems Mother, Brother        Social History Main Topics    Smoking status: Current Every Day Smoker     Packs/day: 1.00    Smokeless tobacco: Former User    Alcohol use Not on file    Drug use: No    Sexual activity: Yes     Partners: Female     Review of Systems    Constitutional: Positive for chills and fever. Negative for activity change, appetite change and fatigue.   HENT: Negative for congestion, hearing loss, sore throat and trouble swallowing.    Eyes: Negative for photophobia and visual disturbance.   Respiratory: Positive for cough and shortness of breath. Negative for wheezing.    Cardiovascular: Positive for chest pain (Right sided ). Negative for palpitations and leg swelling.   Gastrointestinal: Negative for abdominal pain, diarrhea, nausea and vomiting.   Endocrine: Negative for polydipsia, polyphagia and polyuria.   Genitourinary: Negative for difficulty urinating, dysuria, frequency and urgency.   Musculoskeletal: Positive for back pain (Right sided). Negative for neck pain and neck stiffness.   Skin: Negative for rash and wound.   Neurological: Negative for dizziness, syncope, weakness, numbness and headaches.   Psychiatric/Behavioral: Negative for confusion. The patient is not nervous/anxious.      Objective:     Vital Signs (Most Recent):  Temp: 99.6 °F (37.6 °C) (02/11/18 0144)  Pulse: 79 (02/11/18 0144)  Resp: 16 (02/11/18 0144)  BP: (!) 101/59 (02/11/18 0144)  SpO2: (!) 93 % (02/11/18 0144) Vital Signs (24h Range):  Temp:  [98.4 °F (36.9 °C)-99.6 °F (37.6 °C)] 99.6 °F (37.6 °C)  Pulse:  [79-97] 79  Resp:  [16-18] 16  SpO2:  [93 %-97 %] 93 %  BP: (101-140)/(55-73) 101/59     Weight: 104.8 kg (231 lb 1.6 oz)  Body mass index is 28.13 kg/m².    Physical Exam   Constitutional: He is oriented to person, place, and time. He appears well-developed and well-nourished. No distress.   HENT:   Head: Normocephalic and atraumatic.   Eyes: Conjunctivae and EOM are normal. Pupils are equal, round, and reactive to light. No scleral icterus.   Neck: Normal range of motion. Neck supple. No JVD present. No tracheal deviation present. No thyromegaly present.   Cardiovascular: Normal rate, regular rhythm, normal heart sounds and intact distal pulses.  Exam reveals no gallop  and no friction rub.    No murmur heard.  Pulses:       Dorsalis pedis pulses are 2+ on the right side, and 2+ on the left side.   Pulmonary/Chest: Effort normal. No accessory muscle usage. No respiratory distress. He has decreased breath sounds in the right middle field and the right lower field. He has no wheezes. He has no rhonchi. He has no rales.   Abdominal: Soft. Bowel sounds are normal. He exhibits no distension and no mass. There is no tenderness. There is no guarding.   Musculoskeletal: Normal range of motion. He exhibits no edema, tenderness or deformity.   Neurological: He is alert and oriented to person, place, and time. He has normal strength. He exhibits normal muscle tone. Coordination normal.   Skin: Skin is warm, dry and intact. Capillary refill takes less than 2 seconds. No rash noted. He is not diaphoretic. No erythema.   Psychiatric: He has a normal mood and affect. His speech is normal and behavior is normal. Judgment and thought content normal.   Vitals reviewed.        CRANIAL NERVES     CN III, IV, VI   Pupils are equal, round, and reactive to light.  Extraocular motions are normal.        Significant Labs:   CBC:   Recent Labs  Lab 02/11/18  0001   WBC 16.30*   HGB 13.4*   HCT 40.1        CMP:   Recent Labs  Lab 02/11/18  0001   *   K 3.2*      CO2 27   *   BUN 12   CREATININE 0.9   CALCIUM 9.0   PROT 6.4   ALBUMIN 2.8*   BILITOT 0.8   ALKPHOS 65   AST 14   ALT 28   ANIONGAP 8   EGFRNONAA >60     Significant Imaging: I have reviewed all pertinent imaging results/findings within the past 24 hours.     CXR: [facility] IMPRESSION:  Combination of right lung infiltrate and pleural fluid with at least a component of this fluid appearing  free-flowing. Underlying loculation and mass cannot be excluded. CT may be considered for further  evaluation.    Assessment/Plan:     Pleural effusion on right    ?Etiology - will empirically treat for infectious process considering  symptomology, CXR concerning for possible empyema.  Will check CT scan of chest  Consult Pulmonology for diagnostic and therapeutic thoracentesis and follow recommendations.  IV antibiotics - Rocephin and Vancomycin  Monitor vital signs closely  Daily labs  PT/INR  Check Lactic Acid level and procalcitonin  Pain Control        VTE Risk Mitigation         Ordered     Low Risk of VTE  Once      02/11/18 0134     Place ADRIENNE hose  Until discontinued      02/11/18 0134             Maggi Dumont NP  Department of Hospital Medicine   [facility]   no change in bowel habits

## 2019-11-20 DIAGNOSIS — R12 HEARTBURN: ICD-10-CM

## 2019-11-20 DIAGNOSIS — Z01.818 ENCOUNTER FOR OTHER PREPROCEDURAL EXAMINATION: ICD-10-CM

## 2019-11-20 DIAGNOSIS — R10.32 LEFT LOWER QUADRANT PAIN: ICD-10-CM

## 2019-11-26 ENCOUNTER — RESULT REVIEW (OUTPATIENT)
Age: 69
End: 2019-11-26

## 2019-11-26 ENCOUNTER — OUTPATIENT (OUTPATIENT)
Dept: OUTPATIENT SERVICES | Facility: HOSPITAL | Age: 69
LOS: 1 days | Discharge: HOME | End: 2019-11-26
Payer: MEDICARE

## 2019-11-26 VITALS
HEART RATE: 64 BPM | DIASTOLIC BLOOD PRESSURE: 66 MMHG | WEIGHT: 166.01 LBS | HEIGHT: 62 IN | RESPIRATION RATE: 16 BRPM | SYSTOLIC BLOOD PRESSURE: 127 MMHG | OXYGEN SATURATION: 99 % | TEMPERATURE: 97 F

## 2019-11-26 DIAGNOSIS — K11.9 DISEASE OF SALIVARY GLAND, UNSPECIFIED: Chronic | ICD-10-CM

## 2019-11-26 DIAGNOSIS — C80.1 MALIGNANT (PRIMARY) NEOPLASM, UNSPECIFIED: Chronic | ICD-10-CM

## 2019-11-26 DIAGNOSIS — R10.32 LEFT LOWER QUADRANT PAIN: ICD-10-CM

## 2019-11-26 DIAGNOSIS — C50.919 MALIGNANT NEOPLASM OF UNSPECIFIED SITE OF UNSPECIFIED FEMALE BREAST: Chronic | ICD-10-CM

## 2019-11-26 DIAGNOSIS — Z98.890 OTHER SPECIFIED POSTPROCEDURAL STATES: Chronic | ICD-10-CM

## 2019-11-26 DIAGNOSIS — Z90.89 ACQUIRED ABSENCE OF OTHER ORGANS: Chronic | ICD-10-CM

## 2019-11-26 DIAGNOSIS — M12.9 ARTHROPATHY, UNSPECIFIED: Chronic | ICD-10-CM

## 2019-11-26 DIAGNOSIS — R12 HEARTBURN: ICD-10-CM

## 2019-11-26 PROCEDURE — 88305 TISSUE EXAM BY PATHOLOGIST: CPT | Mod: 26

## 2019-11-26 PROCEDURE — 88312 SPECIAL STAINS GROUP 1: CPT

## 2019-11-26 PROCEDURE — 88312 SPECIAL STAINS GROUP 1: CPT | Mod: 26

## 2019-11-26 PROCEDURE — 88305 TISSUE EXAM BY PATHOLOGIST: CPT

## 2019-11-26 RX ORDER — OLMESARTAN MEDOXOMIL 5 MG/1
1 TABLET, FILM COATED ORAL
Qty: 0 | Refills: 0 | DISCHARGE

## 2019-11-26 RX ORDER — ALPRAZOLAM 0.25 MG
1 TABLET ORAL
Qty: 0 | Refills: 0 | DISCHARGE

## 2019-11-26 RX ORDER — LEVOTHYROXINE SODIUM 125 MCG
1 TABLET ORAL
Qty: 0 | Refills: 0 | DISCHARGE

## 2019-11-26 RX ORDER — ALBUTEROL 90 UG/1
2 AEROSOL, METERED ORAL
Qty: 0 | Refills: 0 | DISCHARGE

## 2019-11-26 RX ORDER — ESCITALOPRAM OXALATE 10 MG/1
1 TABLET, FILM COATED ORAL
Qty: 0 | Refills: 0 | DISCHARGE

## 2019-11-26 RX ORDER — BUDESONIDE AND FORMOTEROL FUMARATE DIHYDRATE 160; 4.5 UG/1; UG/1
2 AEROSOL RESPIRATORY (INHALATION)
Qty: 0 | Refills: 0 | DISCHARGE

## 2019-11-26 RX ORDER — IBUPROFEN 200 MG
1 TABLET ORAL
Qty: 0 | Refills: 0 | DISCHARGE

## 2019-11-26 NOTE — ASU PATIENT PROFILE, ADULT - MEDICATION ADMINISTRATION INFO, PROFILE
No pharmacologic ppx 2/2 thrombocytopenia and SDH           Contact Information [phone] no concerns

## 2019-11-26 NOTE — ASU PATIENT PROFILE, ADULT - PSH
Arthropathy  b/l knees (one in 2005 and other side in 2010)  Breast cancer  2002 right lumpectomy with post op chemotherapy and RT s/p lymph node dissection  Cancer  Zrpry-m-qeak inserted for chemotherapy in 2002 and then removed when chemotherapy completed  History of surgery  left brow lift 7/2019  Mass of left parotid gland  Excision of parotid mass with left neck dissection & graft 3/2019  Parotid mass  diagnosed in 2007 with excision of left parotid ("adenoid cystic carcinoma") with post op RT  S/P tonsillectomy

## 2019-11-26 NOTE — ASU PATIENT PROFILE, ADULT - PMH
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

## 2019-12-03 DIAGNOSIS — K29.50 UNSPECIFIED CHRONIC GASTRITIS WITHOUT BLEEDING: ICD-10-CM

## 2019-12-03 DIAGNOSIS — Z85.3 PERSONAL HISTORY OF MALIGNANT NEOPLASM OF BREAST: ICD-10-CM

## 2019-12-03 DIAGNOSIS — E03.9 HYPOTHYROIDISM, UNSPECIFIED: ICD-10-CM

## 2019-12-03 DIAGNOSIS — J44.9 CHRONIC OBSTRUCTIVE PULMONARY DISEASE, UNSPECIFIED: ICD-10-CM

## 2019-12-03 DIAGNOSIS — Z92.21 PERSONAL HISTORY OF ANTINEOPLASTIC CHEMOTHERAPY: ICD-10-CM

## 2019-12-03 DIAGNOSIS — K21.9 GASTRO-ESOPHAGEAL REFLUX DISEASE WITHOUT ESOPHAGITIS: ICD-10-CM

## 2019-12-03 DIAGNOSIS — R12 HEARTBURN: ICD-10-CM

## 2019-12-03 DIAGNOSIS — I10 ESSENTIAL (PRIMARY) HYPERTENSION: ICD-10-CM

## 2019-12-03 DIAGNOSIS — F41.9 ANXIETY DISORDER, UNSPECIFIED: ICD-10-CM

## 2019-12-03 DIAGNOSIS — Z87.891 PERSONAL HISTORY OF NICOTINE DEPENDENCE: ICD-10-CM

## 2019-12-03 DIAGNOSIS — H04.122 DRY EYE SYNDROME OF LEFT LACRIMAL GLAND: ICD-10-CM

## 2019-12-03 DIAGNOSIS — Z92.3 PERSONAL HISTORY OF IRRADIATION: ICD-10-CM

## 2019-12-03 DIAGNOSIS — Z85.89 PERSONAL HISTORY OF MALIGNANT NEOPLASM OF OTHER ORGANS AND SYSTEMS: ICD-10-CM

## 2019-12-09 ENCOUNTER — APPOINTMENT (OUTPATIENT)
Dept: FAMILY MEDICINE | Facility: CLINIC | Age: 69
End: 2019-12-09
Payer: MEDICARE

## 2019-12-09 VITALS
HEART RATE: 120 BPM | BODY MASS INDEX: 29.6 KG/M2 | SYSTOLIC BLOOD PRESSURE: 136 MMHG | DIASTOLIC BLOOD PRESSURE: 80 MMHG | WEIGHT: 165 LBS | OXYGEN SATURATION: 90 % | HEIGHT: 62.5 IN

## 2019-12-09 DIAGNOSIS — Z87.09 PERSONAL HISTORY OF OTHER DISEASES OF THE RESPIRATORY SYSTEM: ICD-10-CM

## 2019-12-09 PROBLEM — R13.10 DYSPHAGIA, UNSPECIFIED: Chronic | Status: ACTIVE | Noted: 2019-11-19

## 2019-12-09 PROBLEM — R12 HEARTBURN: Chronic | Status: ACTIVE | Noted: 2019-11-19

## 2019-12-09 PROBLEM — F41.9 ANXIETY DISORDER, UNSPECIFIED: Chronic | Status: ACTIVE | Noted: 2019-11-19

## 2019-12-09 PROBLEM — B02.9 ZOSTER WITHOUT COMPLICATIONS: Chronic | Status: ACTIVE | Noted: 2019-11-19

## 2019-12-09 PROBLEM — H04.123 DRY EYE SYNDROME OF BILATERAL LACRIMAL GLANDS: Chronic | Status: ACTIVE | Noted: 2019-11-19

## 2019-12-09 PROBLEM — J44.9 CHRONIC OBSTRUCTIVE PULMONARY DISEASE, UNSPECIFIED: Chronic | Status: ACTIVE | Noted: 2019-03-04

## 2019-12-09 PROBLEM — I10 ESSENTIAL (PRIMARY) HYPERTENSION: Chronic | Status: ACTIVE | Noted: 2019-03-04

## 2019-12-09 LAB — S PYO AG SPEC QL IA: NEGATIVE

## 2019-12-09 PROCEDURE — 99214 OFFICE O/P EST MOD 30 MIN: CPT | Mod: 25

## 2019-12-09 PROCEDURE — 87880 STREP A ASSAY W/OPTIC: CPT | Mod: QW

## 2019-12-09 NOTE — PHYSICAL EXAM
[Normal] : no acute distress, well nourished, well developed and well-appearing [de-identified] : pnd congestion  [de-identified] : mild inspiratory and expiratory wheeze

## 2019-12-09 NOTE — HISTORY OF PRESENT ILLNESS
[FreeTextEntry8] : recent onset of cough congestion pnd sinus pressure with discharge wheeze sore throat no fever has had 3 surgeries for left neck for parotid cancer working 40 hours a weak and feels fatigued seeing oncology this week

## 2019-12-09 NOTE — REVIEW OF SYSTEMS
[Sore Throat] : sore throat [Nasal Discharge] : nasal discharge [Cough] : cough [Postnasal Drip] : postnasal drip [Negative] : Cardiovascular

## 2020-01-02 ENCOUNTER — APPOINTMENT (OUTPATIENT)
Dept: FAMILY MEDICINE | Facility: CLINIC | Age: 70
End: 2020-01-02
Payer: MEDICARE

## 2020-01-02 VITALS
TEMPERATURE: 97.8 F | WEIGHT: 165 LBS | HEIGHT: 62.5 IN | HEART RATE: 91 BPM | SYSTOLIC BLOOD PRESSURE: 126 MMHG | RESPIRATION RATE: 16 BRPM | DIASTOLIC BLOOD PRESSURE: 80 MMHG | BODY MASS INDEX: 29.6 KG/M2 | OXYGEN SATURATION: 94 %

## 2020-01-02 DIAGNOSIS — R68.89 OTHER GENERAL SYMPTOMS AND SIGNS: ICD-10-CM

## 2020-01-02 PROCEDURE — 99213 OFFICE O/P EST LOW 20 MIN: CPT

## 2020-01-02 NOTE — PHYSICAL EXAM
[Well-Appearing] : well-appearing [No Acute Distress] : no acute distress [Normal Sclera/Conjunctiva] : normal sclera/conjunctiva [PERRL] : pupils equal round and reactive to light [Normal Outer Ear/Nose] : the outer ears and nose were normal in appearance [Normal Oropharynx] : the oropharynx was normal [No Lymphadenopathy] : no lymphadenopathy [Supple] : supple [No Respiratory Distress] : no respiratory distress  [No Accessory Muscle Use] : no accessory muscle use [Clear to Auscultation] : lungs were clear to auscultation bilaterally [Normal Rate] : normal rate  [Regular Rhythm] : with a regular rhythm [Normal Affect] : the affect was normal [Normal Insight/Judgement] : insight and judgment were intact

## 2020-01-02 NOTE — REVIEW OF SYSTEMS
[Chills] : chills [Fever] : fever [Fatigue] : fatigue [Earache] : no earache [Discharge] : no discharge [Vision Problems] : no vision problems [Nasal Discharge] : no nasal discharge [Sore Throat] : sore throat [Shortness Of Breath] : no shortness of breath [Chest Pain] : no chest pain [Palpitations] : no palpitations [Cough] : cough [Wheezing] : no wheezing [Muscle Pain] : muscle pain [Headache] : headache [Dizziness] : no dizziness

## 2020-01-02 NOTE — PLAN
[FreeTextEntry1] : Flu-like sx\par - likely influenza especially given exposure\par - pt refusing tamiflu and would like to rest\par - encourage to increase hydration\par - tylenol PRN for fever

## 2020-01-02 NOTE — HISTORY OF PRESENT ILLNESS
[FreeTextEntry8] : 69 year old female presents with one day history of fever, chills and body ache. Four of her coworkers tested positive for the flu.

## 2020-01-03 ENCOUNTER — OTHER (OUTPATIENT)
Age: 70
End: 2020-01-03

## 2020-01-18 NOTE — H&P PST ADULT - NOTES
Patient:   EITAN JOHNSON            MRN: [id number]            FIN: [id number]              Age:   84 years     Sex:  MALE     :  39   Associated Diagnoses:   None   Author:   WESLEY NIEVES     Subjective   Awake  ON RA  NO res distress  POst EGD      Health Status   Allergies:    Allergies (1) Active Reaction  NKA None Documented    Current medications:    Medications (18) Active  Scheduled: (13)  Aspirin 325 mg tab  325 mg 1 tab, Oral, Daily  Atorvastatin 10 mg tab  10 mg 1 tab, Oral, Daily  Doxazosin 1 mg tab  2 mg 2 tab, Oral, Q Evening  Gabapentin 100 mg cap  100 mg 1 cap, Oral, Daily  GlipiZIDE 2.5 mg/0.5 tab (1/2 of 5 mg)  1.25 mg 0.25 tab, Oral, BID  Heparin 5,000 unit/1 mL inj  5,000 unit 1 mL, Subcutaneous, Q8H  Insulin human lispro 1 unit/0.01 mL inj  1-6 units, Subcutaneous, QID [with meals & HS]  LORazepam 1 mg tab  1 mg 1 tab, Oral, Daily  Melatonin 3 mg tab  3 mg 1 tab, Oral, Q Bedtime  Metoprolol tartrate 50 mg tab  50 mg 1 tab, Oral, Q12hr  Multivitamin with minerals chew tab  1 tab, Chewed, Daily  Pneumococcal adult vaccine 23-polyvalent 0.5 mL IM inj SDV  0.5 mL, IM, On Call  Tamsulosin 0.4 mg cap  0.4 mg 1 cap, Oral, Daily  Continuous: (0)  PRN: (5)  Acetaminophen 500 mg tab  500 mg 1 tab, Oral, Q6H  CloNIDine 0.1 mg tab  0.1 mg 1 tab, Oral, BID  Dextrose (glucose) 40% 15 gm/37.5 gm oral gel UD  15 gm, Oral, As Directed PRN  Dextrose (glucose) 50% 25 gm/50 mL syringe  12.5 gm 25 mL, IV Push, As Directed PRN  Glucagon 1 mg/1 mL emergency kit SDV  1 mg 1 mL, IM, As Directed PRN      Objective   Intake and Output   I & O between:  2020 17:14 TO 2020 17:14  Med Dosing Weight:  100  kg   2020  24 Hour Intake:   115.00  ( 1.15 mL/kg )  24 Hour Output:   0.00           24 Hour Urine/Stool Output:   0.0  24 Hour Balance:   115.00           24 Hour Urine Output:   0.00  ( 0.00 mL/kg/hr )                   Urine Count:  1         VS/Measurements     Vitals between:    17-JAN-2020 17:14:18   TO   18-JAN-2020 17:14:18                   LAST RESULT MINIMUM MAXIMUM  Temperature 36.3 36.3 37.4  Heart Rate 92 90 102  Respiratory Rate 19 16 24  NISBP           158 142 180  NIDBP           81 65 91  NIMBP           107 91 120  SpO2                    93 91 94  , Hemodynamics   Date Range: 01/17/20 17:14 to 01/18/20 17:14  No hemodynamic data found over the previous 24 hours.   LINES  Peripheral Intravenous Hand Left   Gauge: 20   Charted: 01/18/20 08:00  Inserted: 01/16/20   Days Since Insertion: 2 days  Indication of Use: Saline Lock  NO VENTILATORS QUALIFIED      General:  No acute distress.    Eye:  Pupils are equal, round and reactive to light, Extraocular movements are intact, Normal conjunctiva.   HENT:  Normocephalic, Normal hearing, Oral mucosa is moist, No pharyngeal erythema.   Neck:  Supple, Non-tender, No carotid bruit, No jugular venous distention, No lymphadenopathy, No thyromegaly.   Respiratory:  Lungs are clear to auscultation, Respirations are non-labored, Breath sounds are equal, Symmetrical chest wall expansion, No chest wall tenderness.   Cardiovascular:  Normal rate, Regular rhythm, No murmur, No gallop, Good pulses equal in all extremities, Normal peripheral perfusion, No edema.   Gastrointestinal:  Soft, Non-tender, Non-distended, Normal bowel sounds, No organomegaly.   Lymphatics:  No lymphadenopathy neck, axilla, groin.    Neurologic:  Alert, Oriented, No focal deficits.      Results Review   General results   Interpretation:   Labs between:  17-JAN-2020 17:14 to 18-JAN-2020 17:14  POC GLU:                 Latest Result  Latest Date  Minimum  Min Date  Maximum  Max Date                             (H) 132  18-JAN-2020 (H) 132  18-JAN-2020 (H) 129  17-JAN-2020  COAG:                 INR  PT  PTT  Ddimer  Fibrinogen    18-JAN-2020 1.1  (H) 11.9  26                      Respiratory Labs    No Resp results found over the defined time period  ID Labs   No lab results  for ID labs found in the previous 24 hours.                 Impression and Plan   # Right lung mass with extension to ribs, mediastinal LAP and right suprahilar lymphadenopathy  - Suspicious for metastatic lung CA  - Will schedule for Bronchsocopy  # Right pleural effusion  - Will do diagnostic right throacentesis tomorrow (today is going to multiple tests)  # Chest pain 2/2 extension of mass to the 5th righ rib  - Oncology consult  # H/O tobacco smoking  # Abnormal CT scan of the chest with possible esophageal mass  - Patient i scheduled for EGD tomorrow  # LEg swelling  - VDI of LEs to R/O DVT   has 2 children alive and well (daughter with diabetes)

## 2020-02-13 ENCOUNTER — RX RENEWAL (OUTPATIENT)
Age: 70
End: 2020-02-13

## 2020-03-01 NOTE — PATIENT PROFILE ADULT - NSSTREETDRUGUSE_GEN_A_NUR
Returned from [address] [place] [address] [address]. Tolerated without difficulty.       Tamara Chang RN  03/01/20 2471
never used

## 2020-03-03 ENCOUNTER — APPOINTMENT (OUTPATIENT)
Dept: THORACIC SURGERY | Facility: CLINIC | Age: 70
End: 2020-03-03
Payer: MEDICARE

## 2020-03-03 VITALS
HEIGHT: 62.5 IN | RESPIRATION RATE: 17 BRPM | OXYGEN SATURATION: 96 % | TEMPERATURE: 98 F | HEART RATE: 76 BPM | SYSTOLIC BLOOD PRESSURE: 141 MMHG | BODY MASS INDEX: 28.44 KG/M2 | DIASTOLIC BLOOD PRESSURE: 86 MMHG | WEIGHT: 158.5 LBS

## 2020-03-03 DIAGNOSIS — R91.8 OTHER NONSPECIFIC ABNORMAL FINDING OF LUNG FIELD: ICD-10-CM

## 2020-03-03 DIAGNOSIS — Z87.891 PERSONAL HISTORY OF NICOTINE DEPENDENCE: ICD-10-CM

## 2020-03-03 PROCEDURE — 99205 OFFICE O/P NEW HI 60 MIN: CPT

## 2020-03-03 RX ORDER — OSELTAMIVIR PHOSPHATE 75 MG/1
75 CAPSULE ORAL TWICE DAILY
Qty: 1 | Refills: 0 | Status: COMPLETED | COMMUNITY
Start: 2020-01-03 | End: 2020-03-03

## 2020-03-03 RX ORDER — AZITHROMYCIN 250 MG/1
250 TABLET, FILM COATED ORAL
Qty: 1 | Refills: 0 | Status: COMPLETED | COMMUNITY
Start: 2019-12-09 | End: 2020-03-03

## 2020-03-03 RX ORDER — NAPHAZOLINE HCL/PHENIRAMINE .0268-.315
DROPS OPHTHALMIC (EYE)
Qty: 1 | Refills: 4 | Status: COMPLETED | COMMUNITY
Start: 2019-04-05 | End: 2020-03-03

## 2020-03-03 RX ORDER — BACITRACIN 500 [IU]/G
500 OINTMENT TOPICAL TWICE DAILY
Qty: 1 | Refills: 0 | Status: COMPLETED | COMMUNITY
Start: 2019-04-05 | End: 2020-03-03

## 2020-03-03 RX ORDER — NAPROXEN 500 MG/1
500 TABLET ORAL 3 TIMES DAILY
Qty: 45 | Refills: 0 | Status: COMPLETED | COMMUNITY
Start: 2018-12-29 | End: 2020-03-03

## 2020-03-06 NOTE — DATA REVIEWED
[FreeTextEntry1] : CT chest on 02/21/2020:\par - nonspecific mild diffuse bronchial wall thickening and irregularity reflecting mild reactive/inflammatory bronchial changes\par - 11 mm irregular nodular density in RUL (3:37). There is retraction of adjacent fissure\par - linear oblong nodular lesions are seen medially in RUL\par - peripheral/subpleural nodules are also seen in lateral aspect of RLL, measuring 6-7 mm (3: 68)\par - areas of peribronchial scarring/atelectasis are seen along anterior medial RUL and RML and lingular segment. \par

## 2020-03-06 NOTE — PHYSICAL EXAM
[General Appearance - In No Acute Distress] : in no acute distress [General Appearance - Alert] : alert [Sclera] : the sclera and conjunctiva were normal [Extraocular Movements] : extraocular movements were intact [PERRL With Normal Accommodation] : pupils were equal in size, round, and reactive to light [Oropharynx] : the oropharynx was normal [Outer Ear] : the ears and nose were normal in appearance [Neck Cervical Mass (___cm)] : no neck mass was observed [Thyroid Diffuse Enlargement] : the thyroid was not enlarged [Jugular Venous Distention Increased] : there was no jugular-venous distention [Auscultation Breath Sounds / Voice Sounds] : lungs were clear to auscultation bilaterally [Thyroid Nodule] : there were no palpable thyroid nodules [Heart Rate And Rhythm] : heart rate was normal and rhythm regular [Heart Sounds] : normal S1 and S2 [Murmurs] : no murmurs [Heart Sounds Gallop] : no gallops [Examination Of The Chest] : the chest was normal in appearance [Chest Visual Inspection Thoracic Asymmetry] : no chest asymmetry [Heart Sounds Pericardial Friction Rub] : no pericardial rub [Diminished Respiratory Excursion] : normal chest expansion [2+] : left 2+ [No Abnormalities] : the abdominal aorta was not enlarged and no bruit was heard [Bowel Sounds] : normal bowel sounds [Breast Palpation Mass] : no palpable masses [Breast Appearance] : normal in appearance [Abdomen Soft] : soft [Abdomen Tenderness] : non-tender [Abdomen Mass (___ Cm)] : no abdominal mass palpated [Cervical Lymph Nodes Enlarged Posterior Bilaterally] : posterior cervical [Cervical Lymph Nodes Enlarged Anterior Bilaterally] : anterior cervical [Supraclavicular Lymph Nodes Enlarged Bilaterally] : supraclavicular [No CVA Tenderness] : no ~M costovertebral angle tenderness [No Spinal Tenderness] : no spinal tenderness [Nail Clubbing] : no clubbing  or cyanosis of the fingernails [Abnormal Walk] : normal gait [Musculoskeletal - Swelling] : no joint swelling seen [Motor Tone] : muscle strength and tone were normal [Skin Turgor] : normal skin turgor [] : no rash [Skin Color & Pigmentation] : normal skin color and pigmentation [Sensation] : the sensory exam was normal to light touch and pinprick [Deep Tendon Reflexes (DTR)] : deep tendon reflexes were 2+ and symmetric [No Focal Deficits] : no focal deficits [Right Carotid Bruit] : no bruit heard over the right carotid [Left Carotid Bruit] : no bruit heard over the left carotid [Right Femoral Bruit] : no bruit heard over the right femoral artery [Left Femoral Bruit] : no bruit heard over the left femoral artery [FreeTextEntry1] : Deferred

## 2020-03-06 NOTE — ASSESSMENT
[FreeTextEntry1] : Ms. 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 Titi Richardson (JENNY) for adenocystic carcinoma of left parotid. Surveillance CT chest revealed lung nodules.  \par \par CT chest on 02/21/2020:\par - nonspecific mild diffuse bronchial wall thickening and irregularity reflecting mild reactive/inflammatory bronchial changes\par - 11 mm irregular nodular density in RUL (3:37). There is retraction of adjacent fissure\par - linear oblong nodular lesions are seen medially in RUL\par - peripheral/subpleural nodules are also seen in lateral aspect of RLL, measuring 6-7 mm (3: 68)\par - areas of peribronchial scarring/atelectasis are seen along anterior medial RUL and RML and lingular segment. \par \par Of note, Dr. Kang (Rad/Onc) was recommended RT in 2019, patient refused. \par \par I have reviewed the patient's medical records and diagnostic images at time of this office consultation and have made the following recommendation:\par 1. CT chest reviewed and explained to patient,  I recommended patient to return to office in 3 months with CT Chest without contrast to further evaluate the nodules. \par \par I personally performed the services described in the documentation, reviewed the documentation recorded by the scribe in my presence and it accurately and completely records my words and actions.\par \par JOSIANE, Carolyne Gabriel NP, am scribing for and the presence of DARLING Damon, the following sections HISTORY OF PRESENT ILLNESS, PAST MEDICAL/FAMILY/SOCIAL HISTORY; REVIEW OF SYSTEMS; VITAL SIGNS; PHYSICAL EXAM; DISPOSITION.

## 2020-03-06 NOTE — HISTORY OF PRESENT ILLNESS
[FreeTextEntry1] : Ms. 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 Titi Richardson (JENNY) for adenocystic carcinoma of left parotid. Surveillance CT chest revealed lung nodules.  \par \par CT chest on 02/21/2020:\par - nonspecific mild diffuse bronchial wall thickening and irregularity reflecting mild reactive/inflammatory bronchial changes\par - 11 mm irregular nodular density in RUL (3:37). There is retraction of adjacent fissure\par - linear oblong nodular lesions are seen medially in RUL\par - peripheral/subpleural nodules are also seen in lateral aspect of RLL, measuring 6-7 mm (3: 68)\par - areas of peribronchial scarring/atelectasis are seen along anterior medial RUL and RML and lingular segment. \par \par Of note, Dr. Kang (Rad/Onc) was recommended RT in 2019, patient refused. \par \par Patient is here today for CT surgery consultation, referred by Dr. Titi Richardson (JENNY). Patient c/o SOB on exertion, denies cough, chest pain.

## 2020-03-06 NOTE — CONSULT LETTER
[Dear  ___] : Dear  [unfilled], [Consult Letter:] : I had the pleasure of evaluating your patient, [unfilled]. [( Thank you for referring [unfilled] for consultation for _____ )] : Thank you for referring [unfilled] for consultation for [unfilled] [Please see my note below.] : Please see my note below. [Consult Closing:] : Thank you very much for allowing me to participate in the care of this patient.  If you have any questions, please do not hesitate to contact me. [Sincerely,] : Sincerely, [FreeTextEntry2] : Dr. Titi Richardson (JENNY/Ref)\par Clyde Contreras MD (Hem/Onc) [FreeTextEntry3] : Glenn Gardiner MD, FACS \par Chief, Division of Thoracic Surgery \par Director, Minimally Invasive Thoracic Surgery \par Department of Cardiovascular and Thoracic Surgery \par [facility] \par , Cardiovascular and Thoracic Surgery\par \par

## 2020-04-29 ENCOUNTER — APPOINTMENT (OUTPATIENT)
Dept: FAMILY MEDICINE | Facility: CLINIC | Age: 70
End: 2020-04-29
Payer: MEDICARE

## 2020-04-29 DIAGNOSIS — D49.0 NEOPLASM OF UNSPECIFIED BEHAVIOR OF DIGESTIVE SYSTEM: ICD-10-CM

## 2020-04-29 PROCEDURE — 99213 OFFICE O/P EST LOW 20 MIN: CPT | Mod: 95

## 2020-05-05 ENCOUNTER — APPOINTMENT (OUTPATIENT)
Dept: FAMILY MEDICINE | Facility: CLINIC | Age: 70
End: 2020-05-05
Payer: MEDICARE

## 2020-05-05 PROCEDURE — 99213 OFFICE O/P EST LOW 20 MIN: CPT | Mod: 95

## 2020-06-09 ENCOUNTER — APPOINTMENT (OUTPATIENT)
Dept: OTOLARYNGOLOGY | Facility: CLINIC | Age: 70
End: 2020-06-09

## 2020-06-25 ENCOUNTER — APPOINTMENT (OUTPATIENT)
Dept: FAMILY MEDICINE | Facility: CLINIC | Age: 70
End: 2020-06-25
Payer: MEDICARE

## 2020-06-25 PROCEDURE — 99213 OFFICE O/P EST LOW 20 MIN: CPT | Mod: 95

## 2020-06-25 NOTE — HISTORY OF PRESENT ILLNESS
[Home] : at home, [unfilled] , at the time of the visit. [Medical Office: ([facility])___] : at the medical office located in  [Verbal consent obtained from patient] : the patient, [unfilled] [FreeTextEntry8] : congestion mild cough no fever otc not effective

## 2020-06-25 NOTE — REVIEW OF SYSTEMS
[Nasal Discharge] : nasal discharge [Postnasal Drip] : postnasal drip [Cough] : cough [Negative] : Eyes

## 2020-06-30 ENCOUNTER — APPOINTMENT (OUTPATIENT)
Dept: THORACIC SURGERY | Facility: CLINIC | Age: 70
End: 2020-06-30

## 2020-07-31 ENCOUNTER — RX RENEWAL (OUTPATIENT)
Age: 70
End: 2020-07-31

## 2020-08-24 ENCOUNTER — LABORATORY RESULT (OUTPATIENT)
Age: 70
End: 2020-08-24

## 2020-08-24 ENCOUNTER — APPOINTMENT (OUTPATIENT)
Dept: FAMILY MEDICINE | Facility: CLINIC | Age: 70
End: 2020-08-24
Payer: MEDICARE

## 2020-08-24 VITALS
HEIGHT: 62.5 IN | WEIGHT: 158 LBS | OXYGEN SATURATION: 96 % | HEART RATE: 68 BPM | SYSTOLIC BLOOD PRESSURE: 130 MMHG | BODY MASS INDEX: 28.35 KG/M2 | DIASTOLIC BLOOD PRESSURE: 82 MMHG

## 2020-08-24 PROCEDURE — 99214 OFFICE O/P EST MOD 30 MIN: CPT

## 2020-08-25 LAB
25(OH)D3 SERPL-MCNC: 26.3 NG/ML
ALBUMIN SERPL ELPH-MCNC: 4.6 G/DL
ALP BLD-CCNC: 102 U/L
ALT SERPL-CCNC: 5 U/L
ANION GAP SERPL CALC-SCNC: 12 MMOL/L
AST SERPL-CCNC: 16 U/L
B BURGDOR IGG+IGM SER QL IB: NORMAL
BASOPHILS # BLD AUTO: 0.04 K/UL
BASOPHILS NFR BLD AUTO: 0.8 %
BILIRUB SERPL-MCNC: 0.3 MG/DL
BUN SERPL-MCNC: 17 MG/DL
CALCIUM SERPL-MCNC: 9.4 MG/DL
CHLORIDE SERPL-SCNC: 105 MMOL/L
CHOLEST SERPL-MCNC: 225 MG/DL
CHOLEST/HDLC SERPL: 4 RATIO
CO2 SERPL-SCNC: 23 MMOL/L
CREAT SERPL-MCNC: 0.77 MG/DL
EOSINOPHIL # BLD AUTO: 0.2 K/UL
EOSINOPHIL NFR BLD AUTO: 4.2 %
ESTIMATED AVERAGE GLUCOSE: 111 MG/DL
GLUCOSE SERPL-MCNC: 90 MG/DL
HBA1C MFR BLD HPLC: 5.5 %
HCT VFR BLD CALC: 39.6 %
HDLC SERPL-MCNC: 56 MG/DL
HGB BLD-MCNC: 12.4 G/DL
IMM GRANULOCYTES NFR BLD AUTO: 0.2 %
LDLC SERPL CALC-MCNC: 145 MG/DL
LYMPHOCYTES # BLD AUTO: 0.97 K/UL
LYMPHOCYTES NFR BLD AUTO: 20.2 %
MAN DIFF?: NORMAL
MCHC RBC-ENTMCNC: 27.9 PG
MCHC RBC-ENTMCNC: 31.3 GM/DL
MCV RBC AUTO: 89 FL
MONOCYTES # BLD AUTO: 0.35 K/UL
MONOCYTES NFR BLD AUTO: 7.3 %
NEUTROPHILS # BLD AUTO: 3.23 K/UL
NEUTROPHILS NFR BLD AUTO: 67.3 %
PLATELET # BLD AUTO: 272 K/UL
POTASSIUM SERPL-SCNC: 4.2 MMOL/L
PROT SERPL-MCNC: 7 G/DL
RBC # BLD: 4.45 M/UL
RBC # FLD: 12.9 %
SARS-COV-2 IGG SERPL IA-ACNC: 0.09 INDEX
SARS-COV-2 IGG SERPL QL IA: NEGATIVE
SODIUM SERPL-SCNC: 140 MMOL/L
T4 SERPL-MCNC: 8.3 UG/DL
TRIGL SERPL-MCNC: 120 MG/DL
TSH SERPL-ACNC: 2.77 UIU/ML
URATE SERPL-MCNC: 5.5 MG/DL
VIT B12 SERPL-MCNC: 699 PG/ML
WBC # FLD AUTO: 4.8 K/UL

## 2020-08-26 ENCOUNTER — APPOINTMENT (OUTPATIENT)
Dept: NEUROLOGY | Facility: CLINIC | Age: 70
End: 2020-08-26
Payer: MEDICARE

## 2020-08-26 VITALS
WEIGHT: 158 LBS | BODY MASS INDEX: 28.35 KG/M2 | DIASTOLIC BLOOD PRESSURE: 70 MMHG | TEMPERATURE: 97.8 F | SYSTOLIC BLOOD PRESSURE: 128 MMHG | HEIGHT: 62.5 IN

## 2020-08-26 DIAGNOSIS — Z86.39 PERSONAL HISTORY OF OTHER ENDOCRINE, NUTRITIONAL AND METABOLIC DISEASE: ICD-10-CM

## 2020-08-26 DIAGNOSIS — R42 DIZZINESS AND GIDDINESS: ICD-10-CM

## 2020-08-26 DIAGNOSIS — Z86.59 PERSONAL HISTORY OF OTHER MENTAL AND BEHAVIORAL DISORDERS: ICD-10-CM

## 2020-08-26 PROCEDURE — 99204 OFFICE O/P NEW MOD 45 MIN: CPT

## 2020-08-26 RX ORDER — AZITHROMYCIN 250 MG/1
250 TABLET, FILM COATED ORAL
Qty: 1 | Refills: 0 | Status: DISCONTINUED | COMMUNITY
Start: 2020-03-12 | End: 2020-08-26

## 2020-08-26 RX ORDER — DOXYCYCLINE HYCLATE 100 MG/1
100 CAPSULE ORAL
Qty: 20 | Refills: 0 | Status: DISCONTINUED | COMMUNITY
Start: 2020-05-05 | End: 2020-08-26

## 2020-08-26 NOTE — PHYSICAL EXAM
[General Appearance - Alert] : alert [General Appearance - In No Acute Distress] : in no acute distress [Affect] : the affect was normal [Oriented To Time, Place, And Person] : oriented to person, place, and time [Memory Recent] : recent memory was not impaired [Memory Remote] : remote memory was not impaired [Current Events] : adequate knowledge of current events [Vocabulary] : adequate range of vocabulary [Cranial Nerves Optic (II)] : visual acuity intact bilaterally,  visual fields full to confrontation, pupils equal round and reactive to light [Cranial Nerves Trigeminal (V)] : facial sensation intact symmetrically [Cranial Nerves Oculomotor (III)] : extraocular motion intact [Cranial Nerves Glossopharyngeal (IX)] : tongue and palate midline [Cranial Nerves Accessory (XI - Cranial And Spinal)] : head turning and shoulder shrug symmetric [Motor Strength] : muscle strength was normal in all four extremities [Motor Tone] : muscle tone was normal in all four extremities [Cranial Nerves Hypoglossal (XII)] : there was no tongue deviation with protrusion [Sensation Tactile Decrease] : light touch was intact [Sensation Pain / Temperature Decrease] : pain and temperature was intact [Sensation Vibration Decrease] : vibration was intact [Abnormal Walk] : normal gait [2+] : Ankle jerk left 2+ [Optic Disc Abnormality] : the optic disc were normal in size and color [Edema] : there was no peripheral edema [Involuntary Movements] : no involuntary movements were seen [Dysarthria] : no dysarthria [Aphasia] : no dysphasia/aphasia [Romberg's Sign] : Romberg's sign was negtive [Tremor] : no tremor present [Coordination - Dysmetria Impaired Finger-to-Nose Bilateral] : not present [Plantar Reflex Left Only] : normal on the left [Plantar Reflex Right Only] : normal on the right

## 2020-08-26 NOTE — ASSESSMENT
[FreeTextEntry1] : This is a 70-year-old woman with balance difficulty and what sounds like vertigo.\par I will obtain an MRI of the brain.\par \par She also has a history of cancer treatment.\par Often, peripheral neuropathy can be a side effect of this.\par This can cause balance difficulty\par I will obtain EMG and nerve conduction studies to assess this further.\par \par Meantime I have given her an instruction sheet responsible balance exercises she can do at home.\par \par I will see her back in a few months.

## 2020-08-26 NOTE — HISTORY OF PRESENT ILLNESS
[FreeTextEntry1] : I saw this patient in the office today.\par \par She describes difficulty with her balance.\par This began a few years ago.\par She has fallen on a few occasions.\par She reports that she had a recent fall after feeling a swaying type sensation and nausea.\par \par She does describe occasional episodes of spinning type dizziness but these are rare.\par \par She describes occasional cramping sensations in her feet.\par She does have a history of cancer including breast in 2002, and head and neck cancer in 2007 and 2019.\par She underwent chemotherapy and radiation.

## 2020-08-26 NOTE — CONSULT LETTER
[Dear  ___] : Dear  [unfilled], [Courtesy Letter:] : I had the pleasure of seeing your patient, [unfilled], in my office today. [Please see my note below.] : Please see my note below. [Consult Closing:] : Thank you very much for allowing me to participate in the care of this patient.  If you have any questions, please do not hesitate to contact me. [Sincerely,] : Sincerely, [FreeTextEntry3] : Percy Mixon MD.

## 2020-10-01 ENCOUNTER — APPOINTMENT (OUTPATIENT)
Dept: ORTHOPEDIC SURGERY | Facility: CLINIC | Age: 70
End: 2020-10-01
Payer: MEDICARE

## 2020-10-01 VITALS
WEIGHT: 158 LBS | HEIGHT: 62 IN | DIASTOLIC BLOOD PRESSURE: 90 MMHG | SYSTOLIC BLOOD PRESSURE: 181 MMHG | BODY MASS INDEX: 29.08 KG/M2 | HEART RATE: 70 BPM | TEMPERATURE: 98.6 F

## 2020-10-01 DIAGNOSIS — Z85.9 PERSONAL HISTORY OF MALIGNANT NEOPLASM, UNSPECIFIED: ICD-10-CM

## 2020-10-01 DIAGNOSIS — M17.11 UNILATERAL PRIMARY OSTEOARTHRITIS, RIGHT KNEE: ICD-10-CM

## 2020-10-01 DIAGNOSIS — S83.241A OTHER TEAR OF MEDIAL MENISCUS, CURRENT INJURY, RIGHT KNEE, INITIAL ENCOUNTER: ICD-10-CM

## 2020-10-01 DIAGNOSIS — Z87.09 PERSONAL HISTORY OF OTHER DISEASES OF THE RESPIRATORY SYSTEM: ICD-10-CM

## 2020-10-01 DIAGNOSIS — Z86.39 PERSONAL HISTORY OF OTHER ENDOCRINE, NUTRITIONAL AND METABOLIC DISEASE: ICD-10-CM

## 2020-10-01 PROCEDURE — 73560 X-RAY EXAM OF KNEE 1 OR 2: CPT | Mod: RT

## 2020-10-01 PROCEDURE — 99203 OFFICE O/P NEW LOW 30 MIN: CPT

## 2020-10-05 NOTE — PHYSICAL EXAM
[de-identified] : Left Knee:\par Range of Motion in Degrees	\par 	                  Claimant/Normal:\par 		\par Flexion Active            135                        135-degrees\par Flexion Passive	  135	                135-degrees	\par Extension Active	  0-5	                0-5-degrees	\par Extension Passive	  0-5	                0-5-degrees	\par \par No weakness to flexion/extension.  No evidence of instability in the AP plane or varus or valgus stress.  Negative  Lachman.  Negative pivot shift.  Negative anterior drawer test.  Negative posterior drawer test.  Negative Evelyn.  Negative Apley grind.  No medial or lateral joint line tenderness.  No tenderness over the medial and lateral facet of the patella.  No patellofemoral crepitations.  No lateral tilting patella.  No patellar apprehension.  No crepitation in the medial and lateral femoral condyle.  No proximal or distal swelling, edema or tenderness.  No gross motor or sensory deficits.  No intra-articular swelling.  2+ DP and PT pulses. No varus or valgus malalignment.  Skin is intact.  No rashes, scars or lesions.  \par \par Right Knee:\par Range of Motion in Degrees	\par 	                  Claimant:	Normal:	\par Flexion Active	  135 	               135-degrees	\par Flexion Passive	  135	               135-degrees	\par Extension Active	  0-5	               0-5-degrees	\par Extension Passive     0-5	               0-5-degrees	\par \par No weakness to flexion/extension.  No evidence of instability in the AP plane or varus or valgus stress.  Negative  Lachman.  Negative pivot shift.  Negative anterior drawer test.  Negative posterior drawer test.  Positive Evelyn.  Positive Apley grind.  Positive medial joint line tenderness.  Negative lateral joint line tenderness.  Positive tenderness over the medial and lateral facet of the patella.  Positive patellofemoral crepitations.  No lateral tilting patella.  No patellar apprehension.  Positive crepitation in the medial and lateral femoral condyle.  No proximal or distal swelling, edema or tenderness.  No gross motor or sensory deficits.  Mild effusion. 2+ DP and PT pulses.  No varus or valgus malalignment.  Well healed scar. \par  [de-identified] : Ambulating with a slightly antalgic to antalgic gait.  Station:  Normal.  [de-identified] : General Appearance:  Well-developed, well-nourished female in no acute distress. \par  [de-identified] : Radiographs, two views of the right knee, show moderate degenerative change.

## 2020-10-05 NOTE — HISTORY OF PRESENT ILLNESS
[de-identified] : The patient comes in today.  She has a history of having prior arthroscopic intervention to her right knee many years ago.  She is having some increasing complaints of pain and swelling.  The patient describes the pain as stabbing.  The patient states lying down and bending makes the pain worse.\par \par Ailment Interference:  \par Normal Work:  5/10\par Sleep:  9/10\par Enjoyment of Life:  5/10\par Sports:  9/10 [] : No

## 2020-10-05 NOTE — DISCUSSION/SUMMARY
[de-identified] : At this time, I have recommended that she undergo a course of physical therapy and anti-inflammatory cream for the right knee osteoarthritis and possible meniscal tear.  She will be reassessed in four to six weeks.

## 2020-10-05 NOTE — ADDENDUM
[FreeTextEntry1] : This note was written by Angela Clay on 10/02/2020 acting as scribe for Chinmay Rice III, MD

## 2020-11-13 ENCOUNTER — NON-APPOINTMENT (OUTPATIENT)
Age: 70
End: 2020-11-13

## 2020-11-23 NOTE — ASU DISCHARGE PLAN (ADULT/PEDIATRIC) - BATHING
You can schedule him on Dec 2 at 3:15 or 4 pm or Dec 3 at 2:15 pm to check the one spot only.  Thanks   Do not scrub incision, may shower normally

## 2020-12-08 ENCOUNTER — APPOINTMENT (OUTPATIENT)
Dept: OTOLARYNGOLOGY | Facility: CLINIC | Age: 70
End: 2020-12-08
Payer: MEDICARE

## 2020-12-08 VITALS
SYSTOLIC BLOOD PRESSURE: 186 MMHG | BODY MASS INDEX: 29.08 KG/M2 | WEIGHT: 158 LBS | DIASTOLIC BLOOD PRESSURE: 102 MMHG | HEIGHT: 62 IN | HEART RATE: 84 BPM

## 2020-12-08 PROCEDURE — 99214 OFFICE O/P EST MOD 30 MIN: CPT

## 2020-12-08 NOTE — CONSULT LETTER
[Dear  ___] : Dear  [unfilled], [Courtesy Letter:] : I had the pleasure of seeing your patient, [unfilled], in my office today. [Please see my note below.] : Please see my note below. [Sincerely,] : Sincerely, [FreeTextEntry2] : Clyde Contreras MD ([place], [place]) [FreeTextEntry3] : Titi Richardson MD

## 2020-12-08 NOTE — PHYSICAL EXAM
[Normal] : mucosa is normal [Midline] : trachea located in midline position [de-identified] : some midfacial movement w good eye closure

## 2020-12-08 NOTE — HISTORY OF PRESENT ILLNESS
[None] : No associated symptoms are reported. [de-identified] : Ms. 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 Recon facial reanimation and nerve grafting with . Pt had consultation with  regarding RT, pt refused tx. MRI imaging 8/15/2019 [facility]. Denies pain or discomfort. \par Denies recent cough, fever, chills

## 2020-12-21 PROBLEM — Z87.09 HISTORY OF ACUTE PHARYNGITIS: Status: RESOLVED | Noted: 2019-12-09 | Resolved: 2020-12-21

## 2021-02-23 ENCOUNTER — RX RENEWAL (OUTPATIENT)
Age: 71
End: 2021-02-23

## 2021-03-08 ENCOUNTER — RX RENEWAL (OUTPATIENT)
Age: 71
End: 2021-03-08

## 2021-03-23 ENCOUNTER — APPOINTMENT (OUTPATIENT)
Dept: OTOLARYNGOLOGY | Facility: CLINIC | Age: 71
End: 2021-03-23

## 2021-04-04 ENCOUNTER — TRANSCRIPTION ENCOUNTER (OUTPATIENT)
Age: 71
End: 2021-04-04

## 2021-04-26 ENCOUNTER — TRANSCRIPTION ENCOUNTER (OUTPATIENT)
Age: 71
End: 2021-04-26

## 2021-04-28 ENCOUNTER — APPOINTMENT (OUTPATIENT)
Dept: FAMILY MEDICINE | Facility: CLINIC | Age: 71
End: 2021-04-28
Payer: MEDICARE

## 2021-04-28 PROCEDURE — 99442: CPT

## 2021-05-11 ENCOUNTER — APPOINTMENT (OUTPATIENT)
Dept: OTOLARYNGOLOGY | Facility: CLINIC | Age: 71
End: 2021-05-11
Payer: MEDICARE

## 2021-05-11 VITALS — TEMPERATURE: 97.7 F

## 2021-05-11 DIAGNOSIS — R59.9 ENLARGED LYMPH NODES, UNSPECIFIED: ICD-10-CM

## 2021-05-11 PROCEDURE — 99213 OFFICE O/P EST LOW 20 MIN: CPT

## 2021-05-11 NOTE — CONSULT LETTER
[Dear  ___] : Dear  [unfilled], [Courtesy Letter:] : I had the pleasure of seeing your patient, [unfilled], in my office today. [Please see my note below.] : Please see my note below. [Sincerely,] : Sincerely, [FreeTextEntry2] : Clyde Contreras MD ([place], [place]) [FreeTextEntry3] : Titi Richardson MD, FACS\par \par    [facility] [place]\par Associate Chair\par    Department of Otolaryngology\par \par Professor\par Otolaryngology & Molecular Medicine\par [facility] [employer]\par

## 2021-05-11 NOTE — HISTORY OF PRESENT ILLNESS
[None] : No associated symptoms are reported. [de-identified] : Ms. Roberto presents for follow up. 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 Recon facial reanimation and nerve grafting with . Pt had consultation with  regarding RT, pt refused tx. \par MRI and Ct scan on 1/20/2021. Rel stable changes at skull base and this has been also reviewed w Dr Contreras, her medical oncologist. \par Reports received covid19 vacc

## 2021-05-24 ENCOUNTER — APPOINTMENT (OUTPATIENT)
Dept: OTOLARYNGOLOGY | Facility: CLINIC | Age: 71
End: 2021-05-24

## 2021-05-24 ENCOUNTER — NON-APPOINTMENT (OUTPATIENT)
Age: 71
End: 2021-05-24

## 2021-06-03 ENCOUNTER — APPOINTMENT (OUTPATIENT)
Dept: FAMILY MEDICINE | Facility: CLINIC | Age: 71
End: 2021-06-03
Payer: MEDICARE

## 2021-06-03 VITALS
TEMPERATURE: 97.2 F | BODY MASS INDEX: 28.71 KG/M2 | DIASTOLIC BLOOD PRESSURE: 82 MMHG | HEART RATE: 73 BPM | HEIGHT: 62.5 IN | WEIGHT: 160 LBS | SYSTOLIC BLOOD PRESSURE: 126 MMHG | OXYGEN SATURATION: 98 %

## 2021-06-03 PROCEDURE — 99214 OFFICE O/P EST MOD 30 MIN: CPT

## 2021-06-03 RX ORDER — DOXYCYCLINE HYCLATE 100 MG/1
100 CAPSULE ORAL
Qty: 14 | Refills: 0 | Status: DISCONTINUED | COMMUNITY
Start: 2021-04-28 | End: 2021-06-03

## 2021-06-03 NOTE — HISTORY OF PRESENT ILLNESS
[FreeTextEntry8] : Congested cough and thick mucous drip from sinuses.  Uses Trelegy inhalers. Sees pulmonary\par Sinus congestion increased since last surgery. \par Progression of tumor , no surgery planned. Starting to drool, difficulty with eating. \par Continues on Lexapro, does not need refill at this time. Needs refill of Xanax, takes 1/2 PRN.

## 2021-06-03 NOTE — PHYSICAL EXAM
[No Respiratory Distress] : no respiratory distress  [Normal] : normal rate, regular rhythm, normal S1 and S2 and no murmur heard [de-identified] : LT ear cerumen, nasal congestion, thick PND [de-identified] : Expiratory wheeze throughout, congested cough [de-identified] : Left sided facial weakness, some drooling.

## 2021-06-03 NOTE — PLAN
[FreeTextEntry1] : Increase fluids, start Mucinex to thin mucous.\par Continue with medications for BP and anxiety.\par Take antibiotics for at least 7 days, if bronchitis not resolved complete full 10 days of antibiotics

## 2021-07-08 ENCOUNTER — NON-APPOINTMENT (OUTPATIENT)
Age: 71
End: 2021-07-08

## 2021-07-14 ENCOUNTER — NON-APPOINTMENT (OUTPATIENT)
Age: 71
End: 2021-07-14

## 2021-08-13 ENCOUNTER — APPOINTMENT (OUTPATIENT)
Dept: FAMILY MEDICINE | Facility: CLINIC | Age: 71
End: 2021-08-13
Payer: MEDICARE

## 2021-08-13 ENCOUNTER — NON-APPOINTMENT (OUTPATIENT)
Age: 71
End: 2021-08-13

## 2021-08-13 PROCEDURE — 99442: CPT

## 2021-08-13 NOTE — HISTORY OF PRESENT ILLNESS
[FreeTextEntry8] : 71 year old female presents with sinus infection. States she has current cancer and gets frequent infections. Admits to sinus pressure and coughing up brown sputum. Has PND. Went to urgent care and got nebulizer. Covid swab negative. Was given nebulizer. She is feeling worse and feverish. Believes she needs abx.

## 2021-08-13 NOTE — PLAN
[FreeTextEntry1] : Sinusitis\par - start abx  \par - Cont supportive measures including increased fluid intake\par - f/u if no relief\par

## 2021-08-13 NOTE — REVIEW OF SYSTEMS
[Earache] : no earache [Sore Throat] : no sore throat [Chest Pain] : no chest pain [Palpitations] : no palpitations [Abdominal Pain] : no abdominal pain [Nausea] : no nausea [Diarrhea] : diarrhea

## 2021-08-21 ENCOUNTER — RX RENEWAL (OUTPATIENT)
Age: 71
End: 2021-08-21

## 2021-09-07 ENCOUNTER — APPOINTMENT (OUTPATIENT)
Dept: FAMILY MEDICINE | Facility: CLINIC | Age: 71
End: 2021-09-07
Payer: MEDICARE

## 2021-09-07 ENCOUNTER — NON-APPOINTMENT (OUTPATIENT)
Age: 71
End: 2021-09-07

## 2021-09-07 VITALS
SYSTOLIC BLOOD PRESSURE: 134 MMHG | OXYGEN SATURATION: 96 % | HEIGHT: 62.5 IN | DIASTOLIC BLOOD PRESSURE: 84 MMHG | TEMPERATURE: 97.6 F | HEART RATE: 76 BPM | BODY MASS INDEX: 28.71 KG/M2 | WEIGHT: 160 LBS

## 2021-09-07 DIAGNOSIS — Z13.1 ENCOUNTER FOR SCREENING FOR DIABETES MELLITUS: ICD-10-CM

## 2021-09-07 DIAGNOSIS — G51.0 BELL'S PALSY: ICD-10-CM

## 2021-09-07 PROCEDURE — G0439: CPT

## 2021-09-07 PROCEDURE — G0444 DEPRESSION SCREEN ANNUAL: CPT

## 2021-09-07 PROCEDURE — 93000 ELECTROCARDIOGRAM COMPLETE: CPT | Mod: 59

## 2021-09-07 RX ORDER — DOXYCYCLINE HYCLATE 100 MG/1
100 CAPSULE ORAL
Qty: 20 | Refills: 0 | Status: DISCONTINUED | COMMUNITY
Start: 2021-06-03 | End: 2021-09-07

## 2021-09-07 RX ORDER — DOXYCYCLINE HYCLATE 100 MG/1
100 CAPSULE ORAL
Qty: 14 | Refills: 0 | Status: DISCONTINUED | COMMUNITY
Start: 2021-08-13 | End: 2021-09-07

## 2021-09-07 NOTE — PHYSICAL EXAM
[Normal Sclera/Conjunctiva] : normal sclera/conjunctiva [Normal Outer Ear/Nose] : the outer ears and nose were normal in appearance [No Lymphadenopathy] : no lymphadenopathy [No Edema] : there was no peripheral edema [Coordination Grossly Intact] : coordination grossly intact [Normal Gait] : normal gait [Normal] : affect was normal and insight and judgment were intact [de-identified] : LT cerumen, RT TM normal, left sided site of parotidectomy [de-identified] : expiratory wheeze throughout. Occasional congested cough. [de-identified] : h

## 2021-09-07 NOTE — HEALTH RISK ASSESSMENT
[Good] : ~his/her~  mood as  good [] : No [No] : No [No falls in past year] : Patient reported no falls in the past year [0] : 2) Feeling down, depressed, or hopeless: Not at all (0) [PHQ-2 Negative - No further assessment needed] : PHQ-2 Negative - No further assessment needed [de-identified] : walking, not regular [de-identified] : soft, fluid diet, extra protein [[id number]] : 0 [None] : None [Alone] : lives alone [Employed] : employed [Single] : single [Fully functional (bathing, dressing, toileting, transferring, walking, feeding)] : Fully functional (bathing, dressing, toileting, transferring, walking, feeding) [Fully functional (using the telephone, shopping, preparing meals, housekeeping, doing laundry, using] : Fully functional and needs no help or supervision to perform IADLs (using the telephone, shopping, preparing meals, housekeeping, doing laundry, using transportation, managing medications and managing finances) [Reports changes in hearing] : Reports changes in hearing [Reports changes in vision] : Reports changes in vision [Reports changes in dental health] : Reports no changes in dental health [Smoke Detector] : smoke detector [Seat Belt] :  uses seat belt [Sunscreen] : uses sunscreen [de-identified] : decreased hearing left [de-identified] : vision change, new glasses, recent eye exam

## 2021-09-07 NOTE — PLAN
[FreeTextEntry1] : Blood work done.\par May call back for Xanax when needed.\par Medications refilled.

## 2021-09-07 NOTE — HISTORY OF PRESENT ILLNESS
[de-identified] : Annual CPE\par Multiple concerns, \par HTN controlled with medications. \par Continues to have chronic cough, some SOB with exertion. See pulmonary regularly and uses inhalers regularly.\par Very tired, working full time as  at [employer]. Too tired to walk after work. \par Needing more  protein in diet and making smoothies. \par Due for mammogram. No bone density for years. \par Still balance issues and trips at times but no falls. \par Regular oncology FU. \par Anxious at times and depressed. Does not need refills of medications at this time.

## 2021-09-08 LAB
25(OH)D3 SERPL-MCNC: 19.1 NG/ML
ALBUMIN SERPL ELPH-MCNC: 4.4 G/DL
ALP BLD-CCNC: 97 U/L
ALT SERPL-CCNC: 6 U/L
ANION GAP SERPL CALC-SCNC: 16 MMOL/L
APPEARANCE: CLEAR
AST SERPL-CCNC: 16 U/L
BACTERIA: NEGATIVE
BASOPHILS # BLD AUTO: 0.09 K/UL
BASOPHILS NFR BLD AUTO: 1.2 %
BILIRUB SERPL-MCNC: <0.2 MG/DL
BILIRUBIN URINE: NEGATIVE
BLOOD URINE: NEGATIVE
BUN SERPL-MCNC: 17 MG/DL
CALCIUM SERPL-MCNC: 10 MG/DL
CHLORIDE SERPL-SCNC: 104 MMOL/L
CHOLEST SERPL-MCNC: 228 MG/DL
CO2 SERPL-SCNC: 21 MMOL/L
COLOR: YELLOW
CREAT SERPL-MCNC: 0.8 MG/DL
EOSINOPHIL # BLD AUTO: 0.39 K/UL
EOSINOPHIL NFR BLD AUTO: 5.3 %
ESTIMATED AVERAGE GLUCOSE: 114 MG/DL
GLUCOSE QUALITATIVE U: NEGATIVE
GLUCOSE SERPL-MCNC: 88 MG/DL
HBA1C MFR BLD HPLC: 5.6 %
HCT VFR BLD CALC: 39.4 %
HDLC SERPL-MCNC: 57 MG/DL
HGB BLD-MCNC: 12.6 G/DL
HYALINE CASTS: 7 /LPF
IMM GRANULOCYTES NFR BLD AUTO: 0.3 %
KETONES URINE: NEGATIVE
LDLC SERPL CALC-MCNC: 134 MG/DL
LEUKOCYTE ESTERASE URINE: NEGATIVE
LYMPHOCYTES # BLD AUTO: 1.39 K/UL
LYMPHOCYTES NFR BLD AUTO: 18.8 %
MAN DIFF?: NORMAL
MCHC RBC-ENTMCNC: 28.5 PG
MCHC RBC-ENTMCNC: 32 GM/DL
MCV RBC AUTO: 89.1 FL
MICROSCOPIC-UA: NORMAL
MONOCYTES # BLD AUTO: 0.63 K/UL
MONOCYTES NFR BLD AUTO: 8.5 %
NEUTROPHILS # BLD AUTO: 4.88 K/UL
NEUTROPHILS NFR BLD AUTO: 65.9 %
NITRITE URINE: NEGATIVE
NONHDLC SERPL-MCNC: 171 MG/DL
PH URINE: 6
PLATELET # BLD AUTO: 294 K/UL
POTASSIUM SERPL-SCNC: 4.4 MMOL/L
PROT SERPL-MCNC: 7.2 G/DL
PROTEIN URINE: NEGATIVE
RBC # BLD: 4.42 M/UL
RBC # FLD: 13.2 %
RED BLOOD CELLS URINE: 2 /HPF
SODIUM SERPL-SCNC: 141 MMOL/L
SPECIFIC GRAVITY URINE: 1.02
SQUAMOUS EPITHELIAL CELLS: 10 /HPF
TRIGL SERPL-MCNC: 188 MG/DL
TSH SERPL-ACNC: 3.77 UIU/ML
UROBILINOGEN URINE: NORMAL
WBC # FLD AUTO: 7.4 K/UL
WHITE BLOOD CELLS URINE: 3 /HPF

## 2021-09-24 ENCOUNTER — APPOINTMENT (OUTPATIENT)
Dept: FAMILY MEDICINE | Facility: CLINIC | Age: 71
End: 2021-09-24

## 2021-11-11 ENCOUNTER — APPOINTMENT (OUTPATIENT)
Dept: FAMILY MEDICINE | Facility: CLINIC | Age: 71
End: 2021-11-11
Payer: MEDICARE

## 2021-11-11 DIAGNOSIS — J01.90 ACUTE SINUSITIS, UNSPECIFIED: ICD-10-CM

## 2021-11-11 PROCEDURE — 99442: CPT

## 2021-11-23 ENCOUNTER — APPOINTMENT (OUTPATIENT)
Dept: FAMILY MEDICINE | Facility: CLINIC | Age: 71
End: 2021-11-23

## 2021-12-09 ENCOUNTER — TRANSCRIPTION ENCOUNTER (OUTPATIENT)
Age: 71
End: 2021-12-09

## 2021-12-30 ENCOUNTER — APPOINTMENT (OUTPATIENT)
Dept: FAMILY MEDICINE | Facility: CLINIC | Age: 71
End: 2021-12-30
Payer: MEDICARE

## 2021-12-30 PROCEDURE — 99442: CPT

## 2022-01-05 ENCOUNTER — NON-APPOINTMENT (OUTPATIENT)
Age: 72
End: 2022-01-05

## 2022-01-11 ENCOUNTER — APPOINTMENT (OUTPATIENT)
Dept: OTOLARYNGOLOGY | Facility: CLINIC | Age: 72
End: 2022-01-11
Payer: MEDICARE

## 2022-01-11 PROCEDURE — 99214 OFFICE O/P EST MOD 30 MIN: CPT

## 2022-01-11 NOTE — HISTORY OF PRESENT ILLNESS
[de-identified] : Ms. Roberto presents for follow up. 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 Recon facial reanimation and nerve grafting with . Pt had consultation with  regarding RT, pt refused tx. \par Chest Ct scan on 1/5/2021 [facility].  \par Reports received covid19 vacc

## 2022-01-11 NOTE — CONSULT LETTER
[Dear  ___] : Dear  [unfilled], [Courtesy Letter:] : I had the pleasure of seeing your patient, [unfilled], in my office today. [Please see my note below.] : Please see my note below. [Sincerely,] : Sincerely, [FreeTextEntry2] : Clyde Contreras MD ([place], [place]) [FreeTextEntry3] : Titi Richardson MD, FACS\par \par    [facility] [place]\par Associate Chair\par    Department of Otolaryngology\par \par Professor\par Otolaryngology & Molecular Medicine\par [facility] [employer]\par

## 2022-01-25 ENCOUNTER — APPOINTMENT (OUTPATIENT)
Dept: OTOLARYNGOLOGY | Facility: CLINIC | Age: 72
End: 2022-01-25

## 2022-03-08 ENCOUNTER — APPOINTMENT (OUTPATIENT)
Dept: FAMILY MEDICINE | Facility: CLINIC | Age: 72
End: 2022-03-08
Payer: MEDICARE

## 2022-03-08 PROCEDURE — 99213 OFFICE O/P EST LOW 20 MIN: CPT | Mod: 95

## 2022-03-08 NOTE — HISTORY OF PRESENT ILLNESS
[Home] : at home, [unfilled] , at the time of the visit. [Medical Office: ([facility])___] : at the medical office located in  [Verbal consent obtained from patient] : the patient, [unfilled] [FreeTextEntry8] : Sinus congestion, PND, cough for past three days. Too sick to go to work today. \par Chills and feeling weak\par \par Tested negative for COVID on home test. \par Off balance prior to feeling sick. Has FU with ENT in April

## 2022-03-08 NOTE — PHYSICAL EXAM
[No Respiratory Distress] : no respiratory distress  [Normal] : affect was normal and insight and judgment were intact [de-identified] : nasal congestion

## 2022-03-11 ENCOUNTER — APPOINTMENT (OUTPATIENT)
Dept: FAMILY MEDICINE | Facility: CLINIC | Age: 72
End: 2022-03-11

## 2022-05-24 ENCOUNTER — APPOINTMENT (OUTPATIENT)
Dept: FAMILY MEDICINE | Facility: CLINIC | Age: 72
End: 2022-05-24
Payer: MEDICARE

## 2022-05-24 VITALS
DIASTOLIC BLOOD PRESSURE: 74 MMHG | TEMPERATURE: 97.9 F | WEIGHT: 160 LBS | OXYGEN SATURATION: 97 % | HEART RATE: 87 BPM | HEIGHT: 62.5 IN | BODY MASS INDEX: 28.71 KG/M2 | SYSTOLIC BLOOD PRESSURE: 130 MMHG

## 2022-05-24 PROCEDURE — 99214 OFFICE O/P EST MOD 30 MIN: CPT

## 2022-05-24 NOTE — PHYSICAL EXAM
[Normal] : no respiratory distress, lungs were clear to auscultation bilaterally and no accessory muscle use [de-identified] : Nasal congestion [de-identified] : Rhonchi heard throughout, inspiratory wheeze anterior upper lobes. Productive cough

## 2022-05-24 NOTE — HISTORY OF PRESENT ILLNESS
[FreeTextEntry8] : Sore throat, nasal congestion and headache for one day. Recent trip to [place]\par Did not test for COVID\par COPD worse\par Productive cough\par Saw ENT and on azelastine nasal spray.

## 2022-05-26 ENCOUNTER — NON-APPOINTMENT (OUTPATIENT)
Age: 72
End: 2022-05-26

## 2022-05-27 ENCOUNTER — TRANSCRIPTION ENCOUNTER (OUTPATIENT)
Age: 72
End: 2022-05-27

## 2022-05-28 ENCOUNTER — NON-APPOINTMENT (OUTPATIENT)
Age: 72
End: 2022-05-28

## 2022-06-16 ENCOUNTER — APPOINTMENT (OUTPATIENT)
Dept: FAMILY MEDICINE | Facility: CLINIC | Age: 72
End: 2022-06-16
Payer: MEDICARE

## 2022-06-16 VITALS
DIASTOLIC BLOOD PRESSURE: 76 MMHG | OXYGEN SATURATION: 96 % | HEART RATE: 77 BPM | TEMPERATURE: 97 F | HEIGHT: 62.5 IN | SYSTOLIC BLOOD PRESSURE: 122 MMHG | WEIGHT: 160 LBS | BODY MASS INDEX: 28.71 KG/M2

## 2022-06-16 DIAGNOSIS — J18.9 PNEUMONIA, UNSPECIFIED ORGANISM: ICD-10-CM

## 2022-06-16 PROCEDURE — 99213 OFFICE O/P EST LOW 20 MIN: CPT

## 2022-06-16 NOTE — REVIEW OF SYSTEMS
[Nasal Discharge] : nasal discharge [Sore Throat] : sore throat [Postnasal Drip] : postnasal drip [Shortness Of Breath] : no shortness of breath [Wheezing] : wheezing [Cough] : cough [Negative] : Constitutional

## 2022-06-16 NOTE — HISTORY OF PRESENT ILLNESS
[FreeTextEntry8] : PT diagnosed with COVID several weeks ago\par now having severe congestion, cough and mucus production\par no fevers or chills

## 2022-06-16 NOTE — ASSESSMENT
[FreeTextEntry1] : poss secondary PNA\par discussed abx use if no improvement most likely residual covid symptoms

## 2022-06-16 NOTE — PHYSICAL EXAM
[Normal] : normal rate, regular rhythm, normal S1 and S2 and no murmur heard [de-identified] : scattered crackles

## 2022-07-16 ENCOUNTER — APPOINTMENT (OUTPATIENT)
Dept: FAMILY MEDICINE | Facility: CLINIC | Age: 72
End: 2022-07-16

## 2022-07-16 PROCEDURE — 99442: CPT

## 2022-07-16 RX ORDER — DOXYCYCLINE HYCLATE 100 MG/1
100 CAPSULE ORAL
Qty: 14 | Refills: 0 | Status: DISCONTINUED | COMMUNITY
Start: 2021-11-11 | End: 2022-07-16

## 2022-07-23 ENCOUNTER — APPOINTMENT (OUTPATIENT)
Dept: CT IMAGING | Facility: CLINIC | Age: 72
End: 2022-07-23

## 2022-07-23 ENCOUNTER — APPOINTMENT (OUTPATIENT)
Dept: MRI IMAGING | Facility: CLINIC | Age: 72
End: 2022-07-23

## 2022-07-23 ENCOUNTER — OUTPATIENT (OUTPATIENT)
Dept: OUTPATIENT SERVICES | Facility: HOSPITAL | Age: 72
LOS: 1 days | End: 2022-07-23
Payer: MEDICARE

## 2022-07-23 DIAGNOSIS — C07 MALIGNANT NEOPLASM OF PAROTID GLAND: ICD-10-CM

## 2022-07-23 DIAGNOSIS — C50.919 MALIGNANT NEOPLASM OF UNSPECIFIED SITE OF UNSPECIFIED FEMALE BREAST: Chronic | ICD-10-CM

## 2022-07-23 DIAGNOSIS — Z90.89 ACQUIRED ABSENCE OF OTHER ORGANS: Chronic | ICD-10-CM

## 2022-07-23 DIAGNOSIS — K11.9 DISEASE OF SALIVARY GLAND, UNSPECIFIED: Chronic | ICD-10-CM

## 2022-07-23 DIAGNOSIS — C80.1 MALIGNANT (PRIMARY) NEOPLASM, UNSPECIFIED: Chronic | ICD-10-CM

## 2022-07-23 DIAGNOSIS — Z98.890 OTHER SPECIFIED POSTPROCEDURAL STATES: Chronic | ICD-10-CM

## 2022-07-23 DIAGNOSIS — M12.9 ARTHROPATHY, UNSPECIFIED: Chronic | ICD-10-CM

## 2022-07-23 PROCEDURE — 70543 MRI ORBT/FAC/NCK W/O &W/DYE: CPT | Mod: 26

## 2022-07-23 PROCEDURE — 71250 CT THORAX DX C-: CPT | Mod: 26

## 2022-07-23 PROCEDURE — 71250 CT THORAX DX C-: CPT

## 2022-07-23 PROCEDURE — 70543 MRI ORBT/FAC/NCK W/O &W/DYE: CPT

## 2022-07-23 PROCEDURE — A9585: CPT

## 2022-07-27 ENCOUNTER — NON-APPOINTMENT (OUTPATIENT)
Age: 72
End: 2022-07-27

## 2022-07-28 ENCOUNTER — TRANSCRIPTION ENCOUNTER (OUTPATIENT)
Age: 72
End: 2022-07-28

## 2022-08-03 ENCOUNTER — NON-APPOINTMENT (OUTPATIENT)
Age: 72
End: 2022-08-03

## 2022-08-23 ENCOUNTER — APPOINTMENT (OUTPATIENT)
Dept: OTOLARYNGOLOGY | Facility: CLINIC | Age: 72
End: 2022-08-23

## 2022-08-23 VITALS — SYSTOLIC BLOOD PRESSURE: 173 MMHG | DIASTOLIC BLOOD PRESSURE: 90 MMHG

## 2022-08-23 PROCEDURE — 99214 OFFICE O/P EST MOD 30 MIN: CPT

## 2022-08-23 NOTE — HISTORY OF PRESENT ILLNESS
[de-identified] :  presents for follow up. 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 Recon facial reanimation and nerve grafting with . Pt had consultation with  regarding RT, pt refused tx. \par 7/23/2022 MRI and chest Ct scan stable\par

## 2022-08-23 NOTE — CONSULT LETTER
[Dear  ___] : Dear  [unfilled], [Courtesy Letter:] : I had the pleasure of seeing your patient, [unfilled], in my office today. [Please see my note below.] : Please see my note below. [FreeTextEntry2] : Clyde Contreras MD ([place], [place]) \par  [FreeTextEntry3] : Titi Richardson MD, FACS\par \par    [facility] [place]\par Associate Chair\par    Department of Otolaryngology\par \par Professor\par Otolaryngology & Molecular Medicine\par [facility] [employer]\par

## 2022-08-24 ENCOUNTER — RX RENEWAL (OUTPATIENT)
Age: 72
End: 2022-08-24

## 2022-09-08 ENCOUNTER — RX RENEWAL (OUTPATIENT)
Age: 72
End: 2022-09-08

## 2022-09-09 RX ORDER — FLUTICASONE FUROATE, UMECLIDINIUM BROMIDE AND VILANTEROL TRIFENATATE 100; 62.5; 25 UG/1; UG/1; UG/1
100-62.5-25 POWDER RESPIRATORY (INHALATION)
Qty: 60 | Refills: 1 | Status: ACTIVE | COMMUNITY
Start: 2019-03-05 | End: 1900-01-01

## 2022-09-27 ENCOUNTER — NON-APPOINTMENT (OUTPATIENT)
Age: 72
End: 2022-09-27

## 2022-09-27 ENCOUNTER — APPOINTMENT (OUTPATIENT)
Dept: FAMILY MEDICINE | Facility: CLINIC | Age: 72
End: 2022-09-27

## 2022-09-27 VITALS
DIASTOLIC BLOOD PRESSURE: 100 MMHG | HEIGHT: 62.5 IN | HEART RATE: 85 BPM | TEMPERATURE: 97 F | BODY MASS INDEX: 29.25 KG/M2 | SYSTOLIC BLOOD PRESSURE: 140 MMHG | RESPIRATION RATE: 16 BRPM | WEIGHT: 163 LBS | OXYGEN SATURATION: 96 %

## 2022-09-27 DIAGNOSIS — Z12.11 ENCOUNTER FOR SCREENING FOR MALIGNANT NEOPLASM OF COLON: ICD-10-CM

## 2022-09-27 DIAGNOSIS — R26.89 OTHER ABNORMALITIES OF GAIT AND MOBILITY: ICD-10-CM

## 2022-09-27 DIAGNOSIS — Z00.00 ENCOUNTER FOR GENERAL ADULT MEDICAL EXAMINATION W/OUT ABNORMAL FINDINGS: ICD-10-CM

## 2022-09-27 DIAGNOSIS — E78.5 HYPERLIPIDEMIA, UNSPECIFIED: ICD-10-CM

## 2022-09-27 DIAGNOSIS — Z13.820 ENCOUNTER FOR SCREENING FOR OSTEOPOROSIS: ICD-10-CM

## 2022-09-27 DIAGNOSIS — E55.9 VITAMIN D DEFICIENCY, UNSPECIFIED: ICD-10-CM

## 2022-09-27 PROCEDURE — 99213 OFFICE O/P EST LOW 20 MIN: CPT | Mod: 25

## 2022-09-27 PROCEDURE — 93000 ELECTROCARDIOGRAM COMPLETE: CPT

## 2022-09-27 PROCEDURE — G0439: CPT

## 2022-09-27 RX ORDER — AZITHROMYCIN 250 MG/1
250 TABLET, FILM COATED ORAL
Qty: 1 | Refills: 0 | Status: DISCONTINUED | COMMUNITY
Start: 2022-06-16 | End: 2022-09-27

## 2022-09-27 RX ORDER — ALBUTEROL SULFATE 90 UG/1
108 (90 BASE) INHALANT RESPIRATORY (INHALATION)
Qty: 1 | Refills: 1 | Status: ACTIVE | COMMUNITY
Start: 2019-03-05 | End: 1900-01-01

## 2022-09-28 LAB
25(OH)D3 SERPL-MCNC: 24.7 NG/ML
ALBUMIN SERPL ELPH-MCNC: 4.9 G/DL
ALP BLD-CCNC: 100 U/L
ALT SERPL-CCNC: 7 U/L
ANION GAP SERPL CALC-SCNC: 12 MMOL/L
AST SERPL-CCNC: 17 U/L
BASOPHILS # BLD AUTO: 0.07 K/UL
BASOPHILS NFR BLD AUTO: 0.9 %
BILIRUB SERPL-MCNC: 0.3 MG/DL
BUN SERPL-MCNC: 20 MG/DL
CALCIUM SERPL-MCNC: 9.9 MG/DL
CHLORIDE SERPL-SCNC: 103 MMOL/L
CHOLEST SERPL-MCNC: 266 MG/DL
CO2 SERPL-SCNC: 26 MMOL/L
CREAT SERPL-MCNC: 0.84 MG/DL
EGFR: 75 ML/MIN/1.73M2
EOSINOPHIL # BLD AUTO: 0.25 K/UL
EOSINOPHIL NFR BLD AUTO: 3.1 %
ESTIMATED AVERAGE GLUCOSE: 108 MG/DL
GLUCOSE SERPL-MCNC: 78 MG/DL
HBA1C MFR BLD HPLC: 5.4 %
HCT VFR BLD CALC: 40.4 %
HDLC SERPL-MCNC: 63 MG/DL
HGB BLD-MCNC: 13.3 G/DL
IMM GRANULOCYTES NFR BLD AUTO: 0.3 %
LDLC SERPL CALC-MCNC: 178 MG/DL
LYMPHOCYTES # BLD AUTO: 1.68 K/UL
LYMPHOCYTES NFR BLD AUTO: 21 %
MAN DIFF?: NORMAL
MCHC RBC-ENTMCNC: 29 PG
MCHC RBC-ENTMCNC: 32.9 GM/DL
MCV RBC AUTO: 88.2 FL
MONOCYTES # BLD AUTO: 0.72 K/UL
MONOCYTES NFR BLD AUTO: 9 %
NEUTROPHILS # BLD AUTO: 5.25 K/UL
NEUTROPHILS NFR BLD AUTO: 65.7 %
NONHDLC SERPL-MCNC: 203 MG/DL
PLATELET # BLD AUTO: 276 K/UL
POTASSIUM SERPL-SCNC: 4.6 MMOL/L
PROT SERPL-MCNC: 7.4 G/DL
RBC # BLD: 4.58 M/UL
RBC # FLD: 12.8 %
SODIUM SERPL-SCNC: 141 MMOL/L
TRIGL SERPL-MCNC: 124 MG/DL
TSH SERPL-ACNC: 4.89 UIU/ML
WBC # FLD AUTO: 7.99 K/UL

## 2022-09-28 NOTE — PHYSICAL EXAM
[Normal Sclera/Conjunctiva] : normal sclera/conjunctiva [No Edema] : there was no peripheral edema [No Focal Deficits] : no focal deficits [Normal] : affect was normal and insight and judgment were intact [de-identified] : Tender anterior and posterior RT shoulder, Decreased ROM,

## 2022-09-28 NOTE — HISTORY OF PRESENT ILLNESS
[de-identified] : Annual Wellness\par Hypothyroid, HTN, depression and anxiety. COPD. Needs med refills and blood work\par Does not get flu vaccine or pneumovax. \par Needs mammogram\par Up to date with specialist FU\par Feeling well, Working 5 days a week and enjoying job\par \par RT shoulder pain and decreased ROM.  \par Lower back pain sciatica on RT mainly. Had seen spine specialist and steroid injections. Unable to go to PT due to time and cost.\par A bit stressed, recent death of close friend

## 2022-09-28 NOTE — PLAN
[FreeTextEntry1] : Blood work done\par To see physiatrist re: back pain, sciatica, RT shoulder  pain. \par Medications refilled.\par

## 2022-09-28 NOTE — HEALTH RISK ASSESSMENT
[Very Good] : ~his/her~  mood as very good [Former] : Former [No] : No [No falls in past year] : Patient reported no falls in the past year [None] : None [Alone] : lives alone [Employed] : employed [Fully functional (bathing, dressing, toileting, transferring, walking, feeding)] : Fully functional (bathing, dressing, toileting, transferring, walking, feeding) [Fully functional (using the telephone, shopping, preparing meals, housekeeping, doing laundry, using] : Fully functional and needs no help or supervision to perform IADLs (using the telephone, shopping, preparing meals, housekeeping, doing laundry, using transportation, managing medications and managing finances) [Smoke Detector] : smoke detector [Seat Belt] :  uses seat belt [de-identified] : walking [de-identified] : healthy diet [Reports changes in hearing] : Reports no changes in hearing [Reports changes in vision] : Reports no changes in vision [Reports changes in dental health] : Reports no changes in dental health

## 2022-10-06 ENCOUNTER — APPOINTMENT (OUTPATIENT)
Dept: FAMILY MEDICINE | Facility: CLINIC | Age: 72
End: 2022-10-06

## 2022-10-06 ENCOUNTER — NON-APPOINTMENT (OUTPATIENT)
Age: 72
End: 2022-10-06

## 2022-10-06 PROCEDURE — 99213 OFFICE O/P EST LOW 20 MIN: CPT | Mod: 95

## 2022-10-06 NOTE — PHYSICAL EXAM
[No Respiratory Distress] : no respiratory distress  [Normal] : affect was normal and insight and judgment were intact [de-identified] : nasal congestion [de-identified] : productive cough

## 2022-10-06 NOTE — HISTORY OF PRESENT ILLNESS
[Home] : at home, [unfilled] , at the time of the visit. [Medical Office: ([facility])___] : at the medical office located in  [Verbal consent obtained from patient] : the patient, [unfilled] [FreeTextEntry8] : Cough and congestion for one week. Worse after being out in cold and damp at . \par Increased chest congestion and breathing laboured at times. Very tired and achy. Sinus congestion now.\par Negative COVID\par No fever\par Using inhalers.\par Needs note for work

## 2022-10-11 ENCOUNTER — APPOINTMENT (OUTPATIENT)
Dept: FAMILY MEDICINE | Facility: CLINIC | Age: 72
End: 2022-10-11

## 2022-10-11 DIAGNOSIS — B37.0 CANDIDAL STOMATITIS: ICD-10-CM

## 2022-10-11 PROCEDURE — 99441: CPT

## 2022-10-14 ENCOUNTER — APPOINTMENT (OUTPATIENT)
Dept: RADIOLOGY | Facility: CLINIC | Age: 72
End: 2022-10-14

## 2022-10-14 ENCOUNTER — OUTPATIENT (OUTPATIENT)
Dept: OUTPATIENT SERVICES | Facility: HOSPITAL | Age: 72
LOS: 1 days | End: 2022-10-14
Payer: MEDICARE

## 2022-10-14 ENCOUNTER — APPOINTMENT (OUTPATIENT)
Dept: FAMILY MEDICINE | Facility: CLINIC | Age: 72
End: 2022-10-14

## 2022-10-14 VITALS
DIASTOLIC BLOOD PRESSURE: 84 MMHG | HEIGHT: 62.5 IN | TEMPERATURE: 97.2 F | SYSTOLIC BLOOD PRESSURE: 130 MMHG | WEIGHT: 163 LBS | OXYGEN SATURATION: 98 % | RESPIRATION RATE: 16 BRPM | BODY MASS INDEX: 29.25 KG/M2 | HEART RATE: 72 BPM

## 2022-10-14 DIAGNOSIS — Z98.890 OTHER SPECIFIED POSTPROCEDURAL STATES: Chronic | ICD-10-CM

## 2022-10-14 DIAGNOSIS — C50.919 MALIGNANT NEOPLASM OF UNSPECIFIED SITE OF UNSPECIFIED FEMALE BREAST: Chronic | ICD-10-CM

## 2022-10-14 DIAGNOSIS — M12.9 ARTHROPATHY, UNSPECIFIED: Chronic | ICD-10-CM

## 2022-10-14 DIAGNOSIS — J44.9 CHRONIC OBSTRUCTIVE PULMONARY DISEASE, UNSPECIFIED: ICD-10-CM

## 2022-10-14 DIAGNOSIS — K11.9 DISEASE OF SALIVARY GLAND, UNSPECIFIED: Chronic | ICD-10-CM

## 2022-10-14 DIAGNOSIS — C80.1 MALIGNANT (PRIMARY) NEOPLASM, UNSPECIFIED: Chronic | ICD-10-CM

## 2022-10-14 DIAGNOSIS — R06.02 SHORTNESS OF BREATH: ICD-10-CM

## 2022-10-14 DIAGNOSIS — Z90.89 ACQUIRED ABSENCE OF OTHER ORGANS: Chronic | ICD-10-CM

## 2022-10-14 PROCEDURE — 71046 X-RAY EXAM CHEST 2 VIEWS: CPT

## 2022-10-14 PROCEDURE — 99214 OFFICE O/P EST MOD 30 MIN: CPT

## 2022-10-14 PROCEDURE — 71046 X-RAY EXAM CHEST 2 VIEWS: CPT | Mod: 26

## 2022-10-14 NOTE — PHYSICAL EXAM
[Normal] : normal rate, regular rhythm, normal S1 and S2 and no murmur heard [de-identified] : anxious

## 2022-10-14 NOTE — HISTORY OF PRESENT ILLNESS
[FreeTextEntry8] : FU on bronchitis.\par Concerned about her chest. She is still coughing, productive. Has not finished antibiotics yet.\par Worried she has PN.

## 2022-10-14 NOTE — PLAN
[FreeTextEntry1] : Chest xray stat - negative for PN\par Finish antibiotics, off work until resolved.\par Use inhalers.

## 2022-10-19 ENCOUNTER — APPOINTMENT (OUTPATIENT)
Dept: FAMILY MEDICINE | Facility: CLINIC | Age: 72
End: 2022-10-19

## 2022-10-19 ENCOUNTER — NON-APPOINTMENT (OUTPATIENT)
Age: 72
End: 2022-10-19

## 2022-10-19 VITALS
OXYGEN SATURATION: 95 % | HEIGHT: 62.5 IN | HEART RATE: 94 BPM | BODY MASS INDEX: 29.07 KG/M2 | TEMPERATURE: 97.5 F | SYSTOLIC BLOOD PRESSURE: 130 MMHG | DIASTOLIC BLOOD PRESSURE: 80 MMHG | WEIGHT: 162 LBS

## 2022-10-19 DIAGNOSIS — J45.909 UNSPECIFIED ASTHMA, UNCOMPLICATED: ICD-10-CM

## 2022-10-19 DIAGNOSIS — J20.9 ACUTE BRONCHITIS, UNSPECIFIED: ICD-10-CM

## 2022-10-19 PROCEDURE — 99213 OFFICE O/P EST LOW 20 MIN: CPT

## 2022-10-19 NOTE — HISTORY OF PRESENT ILLNESS
[de-identified] : FU on sinusitis/bronchitis. Feeling much better. Cough subsiding and some energy back \par Needs to be cleared to return to work.\par

## 2022-10-24 ENCOUNTER — APPOINTMENT (OUTPATIENT)
Dept: PHYSICAL MEDICINE AND REHAB | Facility: CLINIC | Age: 72
End: 2022-10-24

## 2022-10-24 ENCOUNTER — OUTPATIENT (OUTPATIENT)
Dept: OUTPATIENT SERVICES | Facility: HOSPITAL | Age: 72
LOS: 1 days | End: 2022-10-24
Payer: MEDICARE

## 2022-10-24 ENCOUNTER — APPOINTMENT (OUTPATIENT)
Dept: RADIOLOGY | Facility: CLINIC | Age: 72
End: 2022-10-24

## 2022-10-24 VITALS
WEIGHT: 162 LBS | HEIGHT: 62 IN | BODY MASS INDEX: 29.81 KG/M2 | DIASTOLIC BLOOD PRESSURE: 75 MMHG | SYSTOLIC BLOOD PRESSURE: 100 MMHG | HEART RATE: 94 BPM

## 2022-10-24 DIAGNOSIS — M25.511 PAIN IN RIGHT SHOULDER: ICD-10-CM

## 2022-10-24 DIAGNOSIS — C50.919 MALIGNANT NEOPLASM OF UNSPECIFIED SITE OF UNSPECIFIED FEMALE BREAST: Chronic | ICD-10-CM

## 2022-10-24 DIAGNOSIS — K11.9 DISEASE OF SALIVARY GLAND, UNSPECIFIED: Chronic | ICD-10-CM

## 2022-10-24 DIAGNOSIS — C80.1 MALIGNANT (PRIMARY) NEOPLASM, UNSPECIFIED: Chronic | ICD-10-CM

## 2022-10-24 DIAGNOSIS — Z90.89 ACQUIRED ABSENCE OF OTHER ORGANS: Chronic | ICD-10-CM

## 2022-10-24 DIAGNOSIS — Z98.890 OTHER SPECIFIED POSTPROCEDURAL STATES: Chronic | ICD-10-CM

## 2022-10-24 DIAGNOSIS — M12.9 ARTHROPATHY, UNSPECIFIED: Chronic | ICD-10-CM

## 2022-10-24 PROCEDURE — 73030 X-RAY EXAM OF SHOULDER: CPT | Mod: 26,RT

## 2022-10-24 PROCEDURE — 73030 X-RAY EXAM OF SHOULDER: CPT

## 2022-10-24 PROCEDURE — 99204 OFFICE O/P NEW MOD 45 MIN: CPT | Mod: GC

## 2022-10-25 NOTE — ASSESSMENT
[FreeTextEntry1] : Ms. JOSH PACHECO  is a 72 year old female who presents with R shoulder and low back pain. R Shoulder pain is likely due to rotator cuff tendonitis with her low back pain due to underlying stenosis/radiculopathy. Denies any red flag signs. Will recommend:\par - MRI L Spine reviewed \par - Will order R Shoulder X-ray\par - Continue Voltaren PrN\par - Patient will try Tylenol up to 3g/day. Given difficulty swallowing, She will try the Tylenol 500mg/15 mL. \par - Patient to continue HEP for now\par - May benefit from R Shoulder CSI in future. \par \par RTC in 2-3 weeks. Patient aware of red flag signs including any changes to their bowel/bladder control, groin numbness or new weakness. Patient knows to seek immediate attention by calling 911 or going to nearest ER if these symptoms appear. \par

## 2022-10-25 NOTE — HISTORY OF PRESENT ILLNESS
[FreeTextEntry1] : Ms. JOSH PACHECO  is a 72 year old female who presents with R shoulder and low back pain .\par \par Location:R shoulder, low back\par Onset: Both chronic, no specific inciting events\par Provocation/Palliative: Pain is worse with sleeping on the R shoulder and AROM of the R shoulder cause pain as well. Pain is better with ibuprofen and Voltaren. Back pain is worse from sitting and standing position. \par Quality: Sharp pain \par Radiation: Stops at the deltoid and doesn't go beyond that. Patient does have pain radiating down on the RLE. \par Severity: 5/10 \par Timing: Worse in the morning. \par \par Does have intermittent numbness in the R hand in all the digits. Denies any associated arm or hand weakness. Denies any loss of bowel/bladder control or any groin numbness.\par Previous medications trialed: Voltaren, ibuprofen. \par Previous procedures relevant to complaint: LARS ( 1-1.5 years ago ) with some relief\par Has tried conservative treatment?: No recent PT

## 2022-10-25 NOTE — DATA REVIEWED
[FreeTextEntry1] : MRI L Spine 21  radiology reviewed by me: multilevel spondylosis, worst at L4-5 and L5-S1, L2-5 spondylolisthesis seen\par \par PATIENT NAME: Zora Roberto\par PATIENT PHONE NUMBER:\par PATIENT ID: [id number]\par : 1950\par DATE OF EXAM: 2021\par R. Phys. Name: Augusto Woo\par R. Phys. Address: [address] [place] [address]\par R. Phys. Phone: [phone]\par MRI-3T LUMBAR SPINE NON CONTRAST\par \par HISTORY: Lower back pain M54.5\par \par TECHNIQUE: MR imaging of the lumbar spine was performed without contrast on a\par 3.0 Lady Ultra High Field Wide Bore MRI unit utilizing the following pulse\par sequences sagittal T1, T2, STIR, and axial proton density.\par \par COMPARISON: No prior studies are available for comparison.\par \par FINDINGS: Five lumbar vertebral bodies are assumed. Coronal localizer images\par demonstrate slight levocurvature. The normal lumbar lordosis is maintained.\par Grade 1 anterolisthesis of L4 on L5 is noted. There is mild retrolisthesis of L3\par on L4 and L2 on L3. There are no focal aggressive osseous lesions. The vertebral\par body heights are intact.\par \par The conus terminates at T12-L1 and the imaged lower spinal cord demonstrates\par normal signal intensity and morphology. There is multilevel discogenic\par degenerative disease with disc dessication and intervertebral disc height\par narrowing. The findings at the individual levels are as follows:\par \par T12-L1: There is no significant central canal or neural foraminal stenosis.\par \par L1-L2: Shallow central disc protrusion does not contribute to significant\par central canal or neural foraminal stenosis.\par \par L2-L3 : A circumferential disc bulge is asymmetric to moderate left and mild\par right neural foraminal stenosis. The exiting left L2 nerve root is abutted. The\par ventral thecal sac is flattened with abutment of both descending L3 nerve roots.\par There is mild facet arthrosis and ligamentum flavum infolding.\par \par L3-L4 : Uncovering of the disc and a circumferential disc bulge with posterior\par osseous ridging contributes to right greater than left neural foraminal stenosis\par and exiting L3 nerve root impingement. Moderate bilateral facet arthrosis and\par ligamentum flavum infolding contribute to moderate central canal stenosis and\par descending L4 nerve root impingement.\par \par L4-L5: Uncovering of the disc and a circumferential disc bulge with severe\par bilateral facet arthrosis and small bilateral facet joint effusions are seen in\par association with ligamentum flavum infolding. Severe central canal stenosis is\par seen with descending nerve root impingement. Bilateral neural foraminal\par narrowing is seen in association with exiting L4 nerve root abutment.\par \par L5-S1: Severe left greater than right facet arthrosis is seen with bilateral\par facet joint effusions. In conjunction with ligamentum flavum infolding and mild\par disc bulge with superimposed broad-based left foraminal disc protrusion\par impinging upon the left L5 nerve root. The descending left S1 nerve root is also\par impinged upon.\par \par The posterior paraspinal muscles are symmetric.\par \par IMPRESSION:\par \par \par L2-L5 spondylolisthesis with spondylosis contributing to impingement of the\par exiting and descending nerve roots at L3-L4 and L5-S1, descending nerve roots at\par L4-L5. Severe lower lumbar facet arthrosis and facet joint effusions are\par present. Correlate clinically for symptoms of lumbar facet syndrome.\par \par Signed by: Lisa Hewitt MD\par Signed Date: 2021 5:01 PM EDT\par \par \par \par SIGNED BY: Lisa Hewitt M.D., Ext. [phone] 05:01 PM\par

## 2022-10-25 NOTE — PHYSICAL EXAM
[FreeTextEntry1] : PE:\par Constitutional: In NAD, calm and cooperative\par MSK (Back)\par 	Inspection: no gross swelling identified\par 	Palpation: Non tender to touch of the bilateral lower lumbar paraspinals\par 	ROM: Pain at end lumbar extension > flexion\par 	Strength: 5/5 strength in bilateral lower extremities\par 	Reflexes: 2+ Patella reflex bilaterally, 2+ Achilles reflex bilaterally, negative clonus bilaterally\par 	Sensation: Intact to light touch in bilateral lower extremities\par Special tests:\par SLR: Equivocal bilaterally. \par TIN: Negative bilaterally. \par FADIR: Negative bilaterally. \par Facet loading: Positive bilaterally. \par \par MSK (R Shoulder):\par 	Inspection: no gross swelling identified\par 	Palpation: Non tender touch over anterior/posterior shoulder\par 	ROM: Restricted IE, ER and Shoulder Abduction 2/2 pain\par 	Strength: 5/5 strength in bilateral upper extremities\par 	Reflexes: 2+ Biceps/Triceps/Brachioradialis reflex bilaterally, Pickard’s negative bilaterally\par 	Sensation: Intact to light touch in bilateral upper extremities\par Special tests: \par Neer's test: Positive on the R. \par Empty Can Test: Positive on the R. \par Hawkin's sign: Negative bilaterally. \par Lift off test: Negative bilaterally.

## 2022-11-07 NOTE — CONSULT NOTE ADULT - CONSULT REQUESTED DATE/TIME
FUTURE VISIT INFORMATION      FUTURE VISIT INFORMATION:    Date: 12/19/22    Time: 12:30    Location: [facility]   REFERRAL INFORMATION:    Referring provider:  Jimmy Moseley MD    Referring providers clinic:  [facility]     Reason for visit/diagnosis  check wound around the trach site     RECORDS REQUESTED FROM:       Clinic name Comments Records Status Imaging Status   [facility] primary [facility]  5/23/22, 5/6/22, 3/29/22- note from Jimmy Moseley MD Epic     Imaging  8/20/20- XR Chest  [facility]  pacs    [facility]  Care everywhere                            23-Mar-2019 14:17

## 2022-11-14 ENCOUNTER — APPOINTMENT (OUTPATIENT)
Dept: PHYSICAL MEDICINE AND REHAB | Facility: CLINIC | Age: 72
End: 2022-11-14
Payer: MEDICARE

## 2022-11-14 VITALS
WEIGHT: 163 LBS | BODY MASS INDEX: 30 KG/M2 | HEART RATE: 73 BPM | HEIGHT: 62 IN | DIASTOLIC BLOOD PRESSURE: 82 MMHG | SYSTOLIC BLOOD PRESSURE: 162 MMHG | RESPIRATION RATE: 14 BRPM

## 2022-11-14 DIAGNOSIS — M25.511 PAIN IN RIGHT SHOULDER: ICD-10-CM

## 2022-11-14 DIAGNOSIS — M75.51 BURSITIS OF RIGHT SHOULDER: ICD-10-CM

## 2022-11-14 PROCEDURE — 20610 DRAIN/INJ JOINT/BURSA W/O US: CPT | Mod: RT

## 2022-11-14 PROCEDURE — 99213 OFFICE O/P EST LOW 20 MIN: CPT | Mod: 25

## 2022-11-14 NOTE — DATA REVIEWED
[FreeTextEntry1] : \par  Xray Shoulder 2 Views, Right             Final\par \par No Documents Attached\par \par \par \par \par   EXAM: [id number] - XR SHOULDER COMP MIN 2V RT  - ORDERED BY: HALEY GUSMAN\par \par \par PROCEDURE DATE:  10/24/2022\par \par \par \par INTERPRETATION:  CLINICAL INDICATION: right shoulder pain\par \par EXAM:\par Internal and external rotation AP right shoulder from 10/24/2022 at 1227. No similar prior studies available for comparison.\par \par IMPRESSION:\par No fractures, dislocations, or AC separation.\par \par Preserved joint spaces and smooth articular margins.\par \par Maintained subacromial and coracoclavicular spaces.\par \par Generalized osteopenia otherwise no discrete lytic or blastic lesions.\par \par No periarticular soft tissue calcifications.\par \par Multiple surgical clips along upper lateral right chest wall.\par \par --- End of Report ---\par \par \par \par \par \par \par RICHARDSON STOVER MD; Attending Radiologist\par This document has been electronically signed. Oct 24 2022 12:39PM\par \par  \par \par  Ordered by: HALEY GUSMAN       Collected/Examined: 24Oct2022 12:27PM       \par Verified by: HALEY GUSMAN 25Oct2022 03:32PM       \par  Result Communication: No patient communication needed at this time;\par Stage: Final       \par  Performed at: [facility]       Resulted: 24Oct2022 12:39PM       Last Updated: 25Oct2022 03:32PM       Accession: [id number]

## 2022-11-14 NOTE — HISTORY OF PRESENT ILLNESS
[FreeTextEntry1] : Ms. JOSH PACHECO is a 72 year old  female who presents for follow up. At last visit, she was ordered a R Shoulder X-ray, continued on Voltaren/Tylenol/HEP and spoke briefly about possible R shoulder CSI. She says she feels about the same, both the back and shoulder have been botheing her. \par \par Location:R shoulder, low back\par Onset: Both chronic, no specific inciting events\par Provocation/Palliative: Pain is worse with sleeping on the R shoulder and AROM of the R shoulder cause pain as well. Pain is better with ibuprofen and Voltaren. Back pain is worse from sitting and standing position. \par Quality: Sharp pain \par Radiation: Stops at the deltoid and doesn't go beyond that. Patient does have pain radiating down on the RLE. \par Severity: 6-7/10 \par Timing: Worse in the morning. \par \par No bowel/bladder changes. No groin numbness.

## 2022-11-14 NOTE — ASSESSMENT
[FreeTextEntry1] : Ms. JOSH PACHECO  is a 72 year old female who presents with R shoulder and low back pain. R Shoulder pain is likely due to rotator cuff tendonitis with her low back pain due to underlying stenosis/radiculopathy. Denies any red flag signs. Will recommend:\par - R shoulder SASD CSI performed today, tolerated well\par - Continue Voltaren PRN\par - Patient will try Tylenol up to 3g/day. Given difficulty swallowing, She will take the Tylenol 500mg/15 mL. \par - Will order MRI L Spine given her patient radicular pain\par - Patient to continue HEP for now\par \par RTC in 2-3 weeks. Patient aware of red flag signs including any changes to their bowel/bladder control, groin numbness or new weakness. Patient knows to seek immediate attention by calling 911 or going to nearest ER if these symptoms appear. \par

## 2022-11-14 NOTE — PHYSICAL EXAM
[FreeTextEntry1] : PE:\par Constitutional: In NAD, calm and cooperative\par MSK (Back)\par 	Inspection: no gross swelling identified\par 	Palpation: Non tender to touch of the bilateral lower lumbar paraspinals\par 	ROM: Pain at end lumbar extension > flexion\par 	Strength: 5/5 strength in bilateral lower extremities\par 	Reflexes: 2+ Patella reflex bilaterally, 2+ Achilles reflex bilaterally, negative clonus bilaterally\par 	Sensation: Intact to light touch in bilateral lower extremities\par Special tests:\par SLR: Equivocal bilaterally. \par Facet loading: Positive bilaterally. \par \par MSK (R Shoulder):\par 	Inspection: no gross swelling identified\par 	Palpation: Non tender touch over anterior/posterior shoulder\par 	ROM: Restricted IE, ER and Shoulder Abduction 2/2 pain\par 	Strength: 5/5 strength in bilateral upper extremities\par 	Reflexes: 2+ Biceps/Triceps/Brachioradialis reflex bilaterally, Pickard’s negative bilaterally\par 	Sensation: Intact to light touch in bilateral upper extremities\par Special tests: \par Neer's test: Positive on the R. \par Empty Can Test: Positive on the R. \par Hawkin's sign: positive on R

## 2022-11-14 NOTE — PROCEDURE
[de-identified] : Reason for procedure: Shoulder pain\par \par Procedure: Subacromial bursa steroid injection\par Physician: Jeremy Parr D.O.\par Medication injected: 40 MG Kenalog, 3 CC Lidocaine 1%\par Sedation medications: None\par Estimated blood loss: None\par Complications: None\par \par Technique: R/B/A to SASD corticosteroid injection were reviewed.  The patient is agreeable and wishes to proceed.  Signed consent form to be scanned into EMR. The patient was placed in seated position. The area was prepped in normal sterile fashion with Chloroprep x3.  A 25 gauge, 1.5 inch needle was advanced into the right subacromial bursa. After negative aspiration of heme, the above medications were injected into the SASD BURSA.  Needle was then removed and bandaid placed over injection site.  There were no complications. The patient was provided with post injection instructions.

## 2022-12-05 ENCOUNTER — APPOINTMENT (OUTPATIENT)
Dept: PHYSICAL MEDICINE AND REHAB | Facility: CLINIC | Age: 72
End: 2022-12-05

## 2022-12-17 ENCOUNTER — NON-APPOINTMENT (OUTPATIENT)
Age: 72
End: 2022-12-17

## 2023-01-02 ENCOUNTER — APPOINTMENT (OUTPATIENT)
Dept: MRI IMAGING | Facility: CLINIC | Age: 73
End: 2023-01-02
Payer: MEDICARE

## 2023-01-02 ENCOUNTER — OUTPATIENT (OUTPATIENT)
Dept: OUTPATIENT SERVICES | Facility: HOSPITAL | Age: 73
LOS: 1 days | End: 2023-01-02
Payer: MEDICARE

## 2023-01-02 DIAGNOSIS — C50.919 MALIGNANT NEOPLASM OF UNSPECIFIED SITE OF UNSPECIFIED FEMALE BREAST: Chronic | ICD-10-CM

## 2023-01-02 DIAGNOSIS — K11.9 DISEASE OF SALIVARY GLAND, UNSPECIFIED: Chronic | ICD-10-CM

## 2023-01-02 DIAGNOSIS — M48.061 SPINAL STENOSIS, LUMBAR REGION WITHOUT NEUROGENIC CLAUDICATION: ICD-10-CM

## 2023-01-02 DIAGNOSIS — Z98.890 OTHER SPECIFIED POSTPROCEDURAL STATES: Chronic | ICD-10-CM

## 2023-01-02 DIAGNOSIS — Z90.89 ACQUIRED ABSENCE OF OTHER ORGANS: Chronic | ICD-10-CM

## 2023-01-02 DIAGNOSIS — M12.9 ARTHROPATHY, UNSPECIFIED: Chronic | ICD-10-CM

## 2023-01-02 DIAGNOSIS — C80.1 MALIGNANT (PRIMARY) NEOPLASM, UNSPECIFIED: Chronic | ICD-10-CM

## 2023-01-02 PROCEDURE — 72148 MRI LUMBAR SPINE W/O DYE: CPT | Mod: 26

## 2023-01-02 PROCEDURE — 72148 MRI LUMBAR SPINE W/O DYE: CPT

## 2023-01-16 ENCOUNTER — APPOINTMENT (OUTPATIENT)
Dept: FAMILY MEDICINE | Facility: CLINIC | Age: 73
End: 2023-01-16
Payer: MEDICARE

## 2023-01-16 ENCOUNTER — NON-APPOINTMENT (OUTPATIENT)
Age: 73
End: 2023-01-16

## 2023-01-16 VITALS
BODY MASS INDEX: 29.81 KG/M2 | TEMPERATURE: 97.1 F | HEART RATE: 92 BPM | WEIGHT: 162 LBS | HEIGHT: 62 IN | DIASTOLIC BLOOD PRESSURE: 78 MMHG | OXYGEN SATURATION: 95 % | SYSTOLIC BLOOD PRESSURE: 130 MMHG

## 2023-01-16 PROCEDURE — 99214 OFFICE O/P EST MOD 30 MIN: CPT

## 2023-01-16 RX ORDER — METHYLPREDNISOLONE 4 MG/1
4 TABLET ORAL
Qty: 1 | Refills: 0 | Status: DISCONTINUED | COMMUNITY
Start: 2022-10-06 | End: 2023-01-16

## 2023-01-24 ENCOUNTER — EMERGENCY (EMERGENCY)
Facility: HOSPITAL | Age: 73
LOS: 0 days | Discharge: HOME | End: 2023-01-24
Attending: EMERGENCY MEDICINE
Payer: MEDICARE

## 2023-01-24 VITALS
SYSTOLIC BLOOD PRESSURE: 163 MMHG | DIASTOLIC BLOOD PRESSURE: 81 MMHG | TEMPERATURE: 99 F | HEART RATE: 78 BPM | OXYGEN SATURATION: 98 % | RESPIRATION RATE: 20 BRPM

## 2023-01-24 VITALS — WEIGHT: 160.06 LBS | HEIGHT: 62 IN

## 2023-01-24 DIAGNOSIS — Z90.89 ACQUIRED ABSENCE OF OTHER ORGANS: Chronic | ICD-10-CM

## 2023-01-24 DIAGNOSIS — C80.1 MALIGNANT (PRIMARY) NEOPLASM, UNSPECIFIED: Chronic | ICD-10-CM

## 2023-01-24 DIAGNOSIS — K11.9 DISEASE OF SALIVARY GLAND, UNSPECIFIED: Chronic | ICD-10-CM

## 2023-01-24 DIAGNOSIS — C50.919 MALIGNANT NEOPLASM OF UNSPECIFIED SITE OF UNSPECIFIED FEMALE BREAST: Chronic | ICD-10-CM

## 2023-01-24 DIAGNOSIS — M12.9 ARTHROPATHY, UNSPECIFIED: Chronic | ICD-10-CM

## 2023-01-24 DIAGNOSIS — Z98.890 OTHER SPECIFIED POSTPROCEDURAL STATES: Chronic | ICD-10-CM

## 2023-01-24 LAB
ALBUMIN SERPL ELPH-MCNC: 3.3 G/DL — SIGNIFICANT CHANGE UP (ref 3.3–5)
ALP SERPL-CCNC: 96 U/L — SIGNIFICANT CHANGE UP (ref 40–120)
ALT FLD-CCNC: 10 U/L — LOW (ref 12–78)
ANION GAP SERPL CALC-SCNC: 3 MMOL/L — LOW (ref 5–17)
AST SERPL-CCNC: 11 U/L — LOW (ref 15–37)
BASOPHILS # BLD AUTO: 0.07 K/UL — SIGNIFICANT CHANGE UP (ref 0–0.2)
BASOPHILS NFR BLD AUTO: 0.7 % — SIGNIFICANT CHANGE UP (ref 0–2)
BILIRUB SERPL-MCNC: 0.3 MG/DL — SIGNIFICANT CHANGE UP (ref 0.2–1.2)
BUN SERPL-MCNC: 29 MG/DL — HIGH (ref 7–23)
CALCIUM SERPL-MCNC: 8.9 MG/DL — SIGNIFICANT CHANGE UP (ref 8.5–10.1)
CHLORIDE SERPL-SCNC: 107 MMOL/L — SIGNIFICANT CHANGE UP (ref 96–108)
CO2 SERPL-SCNC: 25 MMOL/L — SIGNIFICANT CHANGE UP (ref 22–31)
CREAT SERPL-MCNC: 0.89 MG/DL — SIGNIFICANT CHANGE UP (ref 0.5–1.3)
EGFR: 70 ML/MIN/1.73M2 — SIGNIFICANT CHANGE UP
EOSINOPHIL # BLD AUTO: 0.26 K/UL — SIGNIFICANT CHANGE UP (ref 0–0.5)
EOSINOPHIL NFR BLD AUTO: 2.6 % — SIGNIFICANT CHANGE UP (ref 0–6)
FLUAV AG NPH QL: SIGNIFICANT CHANGE UP
FLUBV AG NPH QL: SIGNIFICANT CHANGE UP
GLUCOSE SERPL-MCNC: 90 MG/DL — SIGNIFICANT CHANGE UP (ref 70–99)
HCT VFR BLD CALC: 38.5 % — SIGNIFICANT CHANGE UP (ref 34.5–45)
HGB BLD-MCNC: 12.7 G/DL — SIGNIFICANT CHANGE UP (ref 11.5–15.5)
IMM GRANULOCYTES NFR BLD AUTO: 0.6 % — SIGNIFICANT CHANGE UP (ref 0–0.9)
LACTATE SERPL-SCNC: 1 MMOL/L — SIGNIFICANT CHANGE UP (ref 0.7–2)
LYMPHOCYTES # BLD AUTO: 1.83 K/UL — SIGNIFICANT CHANGE UP (ref 1–3.3)
LYMPHOCYTES # BLD AUTO: 18 % — SIGNIFICANT CHANGE UP (ref 13–44)
MAGNESIUM SERPL-MCNC: 2.1 MG/DL — SIGNIFICANT CHANGE UP (ref 1.6–2.6)
MCHC RBC-ENTMCNC: 29.1 PG — SIGNIFICANT CHANGE UP (ref 27–34)
MCHC RBC-ENTMCNC: 33 GM/DL — SIGNIFICANT CHANGE UP (ref 32–36)
MCV RBC AUTO: 88.1 FL — SIGNIFICANT CHANGE UP (ref 80–100)
MONOCYTES # BLD AUTO: 0.91 K/UL — HIGH (ref 0–0.9)
MONOCYTES NFR BLD AUTO: 9 % — SIGNIFICANT CHANGE UP (ref 2–14)
NEUTROPHILS # BLD AUTO: 7.02 K/UL — SIGNIFICANT CHANGE UP (ref 1.8–7.4)
NEUTROPHILS NFR BLD AUTO: 69.1 % — SIGNIFICANT CHANGE UP (ref 43–77)
NT-PROBNP SERPL-SCNC: 54 PG/ML — SIGNIFICANT CHANGE UP (ref 0–125)
PLATELET # BLD AUTO: 333 K/UL — SIGNIFICANT CHANGE UP (ref 150–400)
POTASSIUM SERPL-MCNC: 4 MMOL/L — SIGNIFICANT CHANGE UP (ref 3.5–5.3)
POTASSIUM SERPL-SCNC: 4 MMOL/L — SIGNIFICANT CHANGE UP (ref 3.5–5.3)
PROT SERPL-MCNC: 6.8 GM/DL — SIGNIFICANT CHANGE UP (ref 6–8.3)
RBC # BLD: 4.37 M/UL — SIGNIFICANT CHANGE UP (ref 3.8–5.2)
RBC # FLD: 13.2 % — SIGNIFICANT CHANGE UP (ref 10.3–14.5)
RSV RNA NPH QL NAA+NON-PROBE: SIGNIFICANT CHANGE UP
SARS-COV-2 RNA SPEC QL NAA+PROBE: SIGNIFICANT CHANGE UP
SODIUM SERPL-SCNC: 135 MMOL/L — SIGNIFICANT CHANGE UP (ref 135–145)
TROPONIN I, HIGH SENSITIVITY RESULT: 4.71 NG/L — SIGNIFICANT CHANGE UP
WBC # BLD: 10.15 K/UL — SIGNIFICANT CHANGE UP (ref 3.8–10.5)
WBC # FLD AUTO: 10.15 K/UL — SIGNIFICANT CHANGE UP (ref 3.8–10.5)

## 2023-01-24 PROCEDURE — 83735 ASSAY OF MAGNESIUM: CPT

## 2023-01-24 PROCEDURE — 84484 ASSAY OF TROPONIN QUANT: CPT

## 2023-01-24 PROCEDURE — 83880 ASSAY OF NATRIURETIC PEPTIDE: CPT

## 2023-01-24 PROCEDURE — 93010 ELECTROCARDIOGRAM REPORT: CPT

## 2023-01-24 PROCEDURE — 70487 CT MAXILLOFACIAL W/DYE: CPT | Mod: MA

## 2023-01-24 PROCEDURE — 85025 COMPLETE CBC W/AUTO DIFF WBC: CPT

## 2023-01-24 PROCEDURE — 71250 CT THORAX DX C-: CPT | Mod: MA

## 2023-01-24 PROCEDURE — 36415 COLL VENOUS BLD VENIPUNCTURE: CPT

## 2023-01-24 PROCEDURE — 99285 EMERGENCY DEPT VISIT HI MDM: CPT

## 2023-01-24 PROCEDURE — 80053 COMPREHEN METABOLIC PANEL: CPT

## 2023-01-24 PROCEDURE — 70460 CT HEAD/BRAIN W/DYE: CPT | Mod: 26,MA

## 2023-01-24 PROCEDURE — 93005 ELECTROCARDIOGRAM TRACING: CPT

## 2023-01-24 PROCEDURE — 87040 BLOOD CULTURE FOR BACTERIA: CPT

## 2023-01-24 PROCEDURE — 83605 ASSAY OF LACTIC ACID: CPT

## 2023-01-24 PROCEDURE — 70460 CT HEAD/BRAIN W/DYE: CPT | Mod: MA

## 2023-01-24 PROCEDURE — 71045 X-RAY EXAM CHEST 1 VIEW: CPT

## 2023-01-24 PROCEDURE — 70487 CT MAXILLOFACIAL W/DYE: CPT | Mod: 26,MA

## 2023-01-24 PROCEDURE — 71045 X-RAY EXAM CHEST 1 VIEW: CPT | Mod: 26

## 2023-01-24 PROCEDURE — 99285 EMERGENCY DEPT VISIT HI MDM: CPT | Mod: 25

## 2023-01-24 PROCEDURE — 0241U: CPT

## 2023-01-24 PROCEDURE — 71250 CT THORAX DX C-: CPT | Mod: 26,MA

## 2023-01-24 RX ORDER — ALBUTEROL 90 UG/1
4 AEROSOL, METERED ORAL ONCE
Refills: 0 | Status: COMPLETED | OUTPATIENT
Start: 2023-01-24 | End: 2023-01-24

## 2023-01-24 RX ORDER — DEXAMETHASONE 0.5 MG/5ML
6 ELIXIR ORAL ONCE
Refills: 0 | Status: COMPLETED | OUTPATIENT
Start: 2023-01-24 | End: 2023-01-24

## 2023-01-24 NOTE — ED STATDOCS - NSFOLLOWUPINSTRUCTIONS_ED_ALL_ED_FT
Follow up with your hematologist/oncologist for further evaluation of the facial thickening and the lung nodule.   Start the new antibiotic.    Return to the ER for any new or other concerns.

## 2023-01-24 NOTE — ED STATDOCS - NSICDXPASTMEDICALHX_GEN_ALL_CORE_FT
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

## 2023-01-24 NOTE — ED ADULT TRIAGE NOTE - CHIEF COMPLAINT QUOTE
SOB, weakness, head congestion and mild dizziness since last week. dx with URI, prescribed steroids prednisone which she completed and doxycycline which she is still taking. hx COPD, head/neck CA and breast CA (not currently undergoing treatment).

## 2023-01-24 NOTE — ED ADULT NURSE NOTE - NSICDXPASTSURGICALHX_GEN_ALL_CORE_FT
PAST SURGICAL HISTORY:  Arthropathy b/l knees (one in 2005 and other side in 2010)    Breast cancer 2002 right lumpectomy with post op chemotherapy and RT s/p lymph node dissection    Cancer Cshkl-d-graf inserted for chemotherapy in 2002 and then removed when chemotherapy completed    History of surgery left brow lift 7/2019    Mass of left parotid gland Excision of parotid mass with left neck dissection & graft 3/2019    Parotid mass diagnosed in 2007 with excision of left parotid ("adenoid cystic carcinoma") with post op RT    S/P tonsillectomy

## 2023-01-24 NOTE — ED STATDOCS - CLINICAL SUMMARY MEDICAL DECISION MAKING FREE TEXT BOX
71 y/o pt presents to the ED with body aches, sinus pain and SOB. Pt had been on prednisone and doxycycline for sinusitis without improvement, plan to check labs and x-ray.

## 2023-01-24 NOTE — ED STATDOCS - NSICDXPASTSURGICALHX_GEN_ALL_CORE_FT
PAST SURGICAL HISTORY:  Arthropathy b/l knees (one in 2005 and other side in 2010)    Breast cancer 2002 right lumpectomy with post op chemotherapy and RT s/p lymph node dissection    Cancer Avnfz-v-tofs inserted for chemotherapy in 2002 and then removed when chemotherapy completed    History of surgery left brow lift 7/2019    Mass of left parotid gland Excision of parotid mass with left neck dissection & graft 3/2019    Parotid mass diagnosed in 2007 with excision of left parotid ("adenoid cystic carcinoma") with post op RT    S/P tonsillectomy

## 2023-01-24 NOTE — ED STATDOCS - PROGRESS NOTE DETAILS
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 Pt was told that she had a sinus infection and placed on doxy and prednisone. Pt felt better for the first few days and now feels worse. Denies fever and states that she never gets fever. PE: coarse breath sounds b/l.  Will check labs, CT, cxr, meds and reeval. -Anthony Quinn PA-C Discussed results with CT with pt and family. R platysma with slight inflammation/cellulitis. Will switch to cllindamycin. And also informed of the JESÚS love nodule which increased in size. Recommended to f/u with oncologist for further evaluation Pt states she has an appointment on 2/14/23. Pt aware and agrees with plan. Strict return precautions given. -Anthony Quinn PA-C

## 2023-01-24 NOTE — ED STATDOCS - ATTENDING APP SHARED VISIT CONTRIBUTION OF CARE
I, Georgia Reich MD,  performed the initial face to face bedside interview with this patient regarding history of present illness, review of symptoms and relevant past medical, social and family history.  I completed an independent physical examination.  I was the initial provider who evaluated this patient.   I personally saw the patient and performed a substantive portion of the visit including all aspects of the medical decision making.  I have signed out the follow up of any pending tests (i.e. labs, radiological studies) to the NICOLE.  I have communicated the patient’s plan of care and disposition with the NICOLE.  The history, relevant review of systems, past medical and surgical history, medical decision making, and physical examination was documented by the scribe in my presence and I attest to the accuracy of the documentation.

## 2023-01-24 NOTE — ED STATDOCS - PATIENT PORTAL LINK FT
You can access the FollowMyHealth Patient Portal offered by [facility] by registering at the following website: [email or web address]. By joining [employer]’s FollowMyHealth portal, you will also be able to view your health information using other applications (apps) compatible with our system.

## 2023-01-24 NOTE — ED ADULT NURSE NOTE - OBJECTIVE STATEMENT
Pt presents to ED for multiple medical complaints. Pt hx of parotid CA s/p facial surgery, pt states she is more susceptible to sinus infections. Pt states past 2 weeks she has been having congestion, SOB, chest discomfort, and states "I have been feeling awful even though I am on antibiotics". Pt has been taking abx for 6 days now. Denies fever/chills, nausea/vomiting.

## 2023-01-24 NOTE — ED STATDOCS - OBJECTIVE STATEMENT
71 y/o female with PMHx of COPD, HTN, hypothyroid, breast CA, herpes zoster, lung mass 2019, parotid mass 2019 s/p facial surgery presents to the ED c/o cough, sore throat, SOB and congestion in the setting of a known recent sinus infection. Pt was put on prednisone 40mg for 5 days and Doxycycline, but has not been getting better. Pt states she went to work Friday, was feeling a little better, but then yesterday started feeling very sick again and now is having worsening SOB. Pt denies fevers. Pt decided to come today because she was feeling worse despite being on Abx for 6 days. Pt does not use oxygen at home normally.

## 2023-01-25 DIAGNOSIS — J44.9 CHRONIC OBSTRUCTIVE PULMONARY DISEASE, UNSPECIFIED: ICD-10-CM

## 2023-01-25 DIAGNOSIS — R09.81 NASAL CONGESTION: ICD-10-CM

## 2023-01-25 DIAGNOSIS — R91.1 SOLITARY PULMONARY NODULE: ICD-10-CM

## 2023-01-25 DIAGNOSIS — Z86.2 PERSONAL HISTORY OF DISEASES OF THE BLOOD AND BLOOD-FORMING ORGANS AND CERTAIN DISORDERS INVOLVING THE IMMUNE MECHANISM: ICD-10-CM

## 2023-01-25 DIAGNOSIS — E03.9 HYPOTHYROIDISM, UNSPECIFIED: ICD-10-CM

## 2023-01-25 DIAGNOSIS — Z86.59 PERSONAL HISTORY OF OTHER MENTAL AND BEHAVIORAL DISORDERS: ICD-10-CM

## 2023-01-25 DIAGNOSIS — Z85.3 PERSONAL HISTORY OF MALIGNANT NEOPLASM OF BREAST: ICD-10-CM

## 2023-01-25 DIAGNOSIS — M17.0 BILATERAL PRIMARY OSTEOARTHRITIS OF KNEE: ICD-10-CM

## 2023-01-25 DIAGNOSIS — Z20.822 CONTACT WITH AND (SUSPECTED) EXPOSURE TO COVID-19: ICD-10-CM

## 2023-01-25 DIAGNOSIS — I10 ESSENTIAL (PRIMARY) HYPERTENSION: ICD-10-CM

## 2023-01-25 DIAGNOSIS — Z88.0 ALLERGY STATUS TO PENICILLIN: ICD-10-CM

## 2023-01-25 DIAGNOSIS — R06.02 SHORTNESS OF BREATH: ICD-10-CM

## 2023-01-25 DIAGNOSIS — Z85.858 PERSONAL HISTORY OF MALIGNANT NEOPLASM OF OTHER ENDOCRINE GLANDS: ICD-10-CM

## 2023-01-25 DIAGNOSIS — L03.211 CELLULITIS OF FACE: ICD-10-CM

## 2023-01-25 DIAGNOSIS — R05.9 COUGH, UNSPECIFIED: ICD-10-CM

## 2023-01-25 DIAGNOSIS — F41.9 ANXIETY DISORDER, UNSPECIFIED: ICD-10-CM

## 2023-01-26 NOTE — PHYSICAL EXAM
Drug  HYDROcodone-Acetaminophen 7.5-325MG tablets    PA initiated today via [employer]   [Midline] : trachea located in midline position [Normal] : no rashes

## 2023-01-29 LAB
CULTURE RESULTS: SIGNIFICANT CHANGE UP
CULTURE RESULTS: SIGNIFICANT CHANGE UP
SPECIMEN SOURCE: SIGNIFICANT CHANGE UP
SPECIMEN SOURCE: SIGNIFICANT CHANGE UP

## 2023-01-30 NOTE — PHYSICAL EXAM
[No Acute Distress] : no acute distress [Well Nourished] : well nourished [Well Developed] : well developed [Normal TMs] : both tympanic membranes were normal [Neck Supple] : was supple [No Respiratory Distress] : no respiratory distress  [Normal Rate] : normal rate  [Regular Rhythm] : with a regular rhythm [Normal S1, S2] : normal S1 and S2 [No Murmur] : no murmur heard [Alert and Oriented x3] : oriented to person, place, and time [de-identified] : Bilateral sinus tenderness; Oropharynx with mild erythema, no exudates, postnasal drip present [de-identified] : + bilateral wheezing and rhonchi

## 2023-01-30 NOTE — HISTORY OF PRESENT ILLNESS
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

## 2023-01-30 NOTE — REVIEW OF SYSTEMS
[Sore Throat] : sore throat [Shortness Of Breath] : shortness of breath [Wheezing] : wheezing [Cough] : cough [Fever] : no fever [Chills] : no chills [Chest Pain] : no chest pain

## 2023-01-30 NOTE — ASSESSMENT
[FreeTextEntry1] : COPD exacerbation\par - resume Trelegy inhaler, samples given to patient \par - c/w Albuterol inhaler as directed when needed\par - take Prednisone as directed\par - take Doxycycline as directed\par \par Chronic sinusitis\par - take Doxycycline as directed\par \par HTN\par - stable, c/w Olmesartan \par \par call back or return to office if symptoms worsen and/or don't improve

## 2023-02-14 ENCOUNTER — APPOINTMENT (OUTPATIENT)
Dept: OTOLARYNGOLOGY | Facility: CLINIC | Age: 73
End: 2023-02-14
Payer: MEDICARE

## 2023-02-14 PROCEDURE — 99214 OFFICE O/P EST MOD 30 MIN: CPT

## 2023-02-14 NOTE — CONSULT LETTER
[Dear  ___] : Dear  [unfilled], [Courtesy Letter:] : I had the pleasure of seeing your patient, [unfilled], in my office today. [Please see my note below.] : Please see my note below. [Sincerely,] : Sincerely, [FreeTextEntry2] : Clyde Contreras MD ([place], [place]) [FreeTextEntry3] : Titi Richardson MD, FACS\par \par    [facility]\par Associate Chair\par    Department of Otolaryngology\par \par Professor\par Otolaryngology & Molecular Medicine\par [employer]\par

## 2023-02-14 NOTE — HISTORY OF PRESENT ILLNESS
[de-identified] : pt in fu sp resectikon rec ACC L parotid region w facial n sacrifice. 3/19.  Had recent hospitalization for presumed viral illness

## 2023-04-03 ENCOUNTER — APPOINTMENT (OUTPATIENT)
Dept: FAMILY MEDICINE | Facility: CLINIC | Age: 73
End: 2023-04-03
Payer: MEDICARE

## 2023-04-03 VITALS
DIASTOLIC BLOOD PRESSURE: 100 MMHG | OXYGEN SATURATION: 97 % | HEART RATE: 84 BPM | SYSTOLIC BLOOD PRESSURE: 142 MMHG | HEIGHT: 62.5 IN | TEMPERATURE: 97.2 F | WEIGHT: 163 LBS | BODY MASS INDEX: 29.25 KG/M2

## 2023-04-03 VITALS — SYSTOLIC BLOOD PRESSURE: 154 MMHG | DIASTOLIC BLOOD PRESSURE: 102 MMHG

## 2023-04-03 DIAGNOSIS — R10.814 LEFT LOWER QUADRANT ABDOMINAL TENDERNESS: ICD-10-CM

## 2023-04-03 DIAGNOSIS — Z12.31 ENCOUNTER FOR SCREENING MAMMOGRAM FOR MALIGNANT NEOPLASM OF BREAST: ICD-10-CM

## 2023-04-03 PROCEDURE — 99214 OFFICE O/P EST MOD 30 MIN: CPT

## 2023-04-03 RX ORDER — ALBUTEROL SULFATE 0.63 MG/3ML
0.63 SOLUTION RESPIRATORY (INHALATION)
Qty: 1 | Refills: 0 | Status: ACTIVE | COMMUNITY
Start: 2021-08-12 | End: 1900-01-01

## 2023-04-03 NOTE — HEALTH RISK ASSESSMENT
[1] : 2) Feeling down, depressed, or hopeless for several days (1) [PHQ-2 Positive] : PHQ-2 Positive [Several Days (1)] : 4.) Feeling tired or having little energy? Several days [Not at All (0)] : 8.) Moving or speaking so slowly that other people could have noticed, or the opposite, moving or speaking faster than usual? Not at all [Mild] : severity of depression is mild [Somewhat Difficult] : How difficult have these problems made it for you to do your work, take care of things at home, or get along with people? Somewhat difficult [PHQ-9 Negative - No further assessment needed] : PHQ-9 Negative - No further assessment needed [UDD4Clnti] : 2 [[id number]] : 4

## 2023-04-03 NOTE — HISTORY OF PRESENT ILLNESS
[FreeTextEntry8] : Pt c/o 5 days of lower abdominal pains. Pt has had some diarrhea initially but now well formed small round stools.  Denies fever, bloody stools.\par Pt has COPD, has been exacerbated recently. Pt has had to use albuterol for past few days. Mucous has changed to be slightly increased in past month\par Pt works for [employer]. Pt has had a reduction in salary and feels down for past few days

## 2023-04-03 NOTE — ASSESSMENT
[FreeTextEntry1] : LLQ abd pain/tenderness - ct ab/pelvis r/o diverticulitis\par COPD - refill albuterol prn. if cough progresses start zpak\par htn- BP elevated today possibly due to acute illness, pt to f/u next week to recheck BP\par Due for screening mammo, ordered \par mood mildly depressed recenlty as she has had work stressors. monitor for worsening mood

## 2023-04-04 ENCOUNTER — APPOINTMENT (OUTPATIENT)
Dept: CT IMAGING | Facility: CLINIC | Age: 73
End: 2023-04-04

## 2023-04-04 ENCOUNTER — OUTPATIENT (OUTPATIENT)
Dept: OUTPATIENT SERVICES | Facility: HOSPITAL | Age: 73
LOS: 1 days | End: 2023-04-04
Payer: MEDICARE

## 2023-04-04 ENCOUNTER — RESULT REVIEW (OUTPATIENT)
Age: 73
End: 2023-04-04

## 2023-04-04 DIAGNOSIS — M12.9 ARTHROPATHY, UNSPECIFIED: Chronic | ICD-10-CM

## 2023-04-04 DIAGNOSIS — C50.919 MALIGNANT NEOPLASM OF UNSPECIFIED SITE OF UNSPECIFIED FEMALE BREAST: Chronic | ICD-10-CM

## 2023-04-04 DIAGNOSIS — Z90.89 ACQUIRED ABSENCE OF OTHER ORGANS: Chronic | ICD-10-CM

## 2023-04-04 DIAGNOSIS — K11.9 DISEASE OF SALIVARY GLAND, UNSPECIFIED: Chronic | ICD-10-CM

## 2023-04-04 DIAGNOSIS — C80.1 MALIGNANT (PRIMARY) NEOPLASM, UNSPECIFIED: Chronic | ICD-10-CM

## 2023-04-04 DIAGNOSIS — R10.814 LEFT LOWER QUADRANT ABDOMINAL TENDERNESS: ICD-10-CM

## 2023-04-04 DIAGNOSIS — Z98.890 OTHER SPECIFIED POSTPROCEDURAL STATES: Chronic | ICD-10-CM

## 2023-04-04 PROCEDURE — 74176 CT ABD & PELVIS W/O CONTRAST: CPT

## 2023-04-04 PROCEDURE — 74176 CT ABD & PELVIS W/O CONTRAST: CPT | Mod: 26

## 2023-04-10 ENCOUNTER — APPOINTMENT (OUTPATIENT)
Dept: FAMILY MEDICINE | Facility: CLINIC | Age: 73
End: 2023-04-10
Payer: MEDICARE

## 2023-04-10 VITALS
HEIGHT: 62.5 IN | DIASTOLIC BLOOD PRESSURE: 88 MMHG | TEMPERATURE: 98.3 F | SYSTOLIC BLOOD PRESSURE: 143 MMHG | HEART RATE: 88 BPM | OXYGEN SATURATION: 97 %

## 2023-04-10 VITALS — SYSTOLIC BLOOD PRESSURE: 140 MMHG | DIASTOLIC BLOOD PRESSURE: 92 MMHG

## 2023-04-10 DIAGNOSIS — Z01.818 ENCOUNTER FOR OTHER PREPROCEDURAL EXAMINATION: ICD-10-CM

## 2023-04-10 DIAGNOSIS — H25.092 OTHER AGE-RELATED INCIPIENT CATARACT, LEFT EYE: ICD-10-CM

## 2023-04-10 DIAGNOSIS — H26.9 UNSPECIFIED CATARACT: ICD-10-CM

## 2023-04-10 DIAGNOSIS — I10 ESSENTIAL (PRIMARY) HYPERTENSION: ICD-10-CM

## 2023-04-10 PROCEDURE — 99214 OFFICE O/P EST MOD 30 MIN: CPT

## 2023-04-10 NOTE — RESULTS/DATA
[] : results reviewed [de-identified] : 9/27/22 EKG- NSR 69 bpm no st t changes, septal Q waves unchanged from prior EKGs

## 2023-04-10 NOTE — HISTORY OF PRESENT ILLNESS
[COPD] : COPD [No Pertinent Cardiac History] : no history of aortic stenosis, atrial fibrillation, coronary artery disease, recent myocardial infarction, or implantable device/pacemaker [No Adverse Anesthesia Reaction] : no adverse anesthesia reaction in self or family member [Chronic Anticoagulation] : no chronic anticoagulation [Chronic Kidney Disease] : no chronic kidney disease [Diabetes] : no diabetes [(Patient denies any chest pain, claudication, dyspnea on exertion, orthopnea, palpitations or syncope)] : Patient denies any chest pain, claudication, dyspnea on exertion, orthopnea, palpitations or syncope [Good (7-10 METs)] : Good (7-10 METs) [FreeTextEntry1] : R then L cataract extraction and intraocular lens placement  [FreeTextEntry2] : 4/21/23 R eye, 5/5/23 L eye [FreeTextEntry3] : Dr. Burrell [FreeTextEntry4] : Pt in office for medical clearance. Pt to go for procedure for R cataract surgery. Pt denies chest pain, palpitations, dyspnea, fever, chills, nausea, or vomiting.\par Pt has remote h/o right breast cancer s/p lumpectomy, chemo, radiation. 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 \par Pt has COPD, has been improved recently. Pt using nebulizer prn.\par Ex smoker, 1ppdx 40 yrs, quit approx 2013\par

## 2023-04-10 NOTE — ASSESSMENT
[Patient Optimized for Surgery] : Patient optimized for surgery [FreeTextEntry4] : Pt is medically optimized for procedure at this time. Pt has hx of htn and COPD. monitor perioperatively BP and for bronchospasm.\par \par htn- bp suboptimal control today, inc olmesartan to 20mg 1.5 tabs per day. f/u in 2-3 mos to recheck bp\par COPD - controlled cont trelegy daily and albuterol prn\par \par fax to [facility]  and Dr. Burrell office

## 2023-04-16 NOTE — ED ADULT TRIAGE NOTE - PRO INTERPRETER NEED 2
Please see your scheduled follow up appointments below.    4/18/2023 9:15 AM Ga Waldrop MD [facility] - [facility] [address]   4/20/2023 9:00 AM Jovan Barbosa MD [facility] - Cardiology [address]   5/1/2023 1:00 PM Eugenio Magana MD [facility]- Palliative Medicine [address] [place] [address]     
English

## 2023-04-17 ENCOUNTER — NON-APPOINTMENT (OUTPATIENT)
Age: 73
End: 2023-04-17

## 2023-04-17 ENCOUNTER — APPOINTMENT (OUTPATIENT)
Dept: FAMILY MEDICINE | Facility: CLINIC | Age: 73
End: 2023-04-17
Payer: MEDICARE

## 2023-04-17 PROCEDURE — 99442: CPT

## 2023-05-09 NOTE — ASU PREOP CHECKLIST - LOOSE TEETH
[de-identified] : cough x 2 days, afebrile, pt weighed with dry diaper. [FreeTextEntry6] : - Congestion, ongoing, started claritin\par - Cough\par - Thinks wheezing, was last week but resolved, wants to check\par - No fever\par - Would like to see allergist no

## 2023-05-30 ENCOUNTER — EMERGENCY (EMERGENCY)
Facility: HOSPITAL | Age: 73
LOS: 0 days | Discharge: HOME | End: 2023-05-31
Attending: HOSPITALIST
Payer: MEDICARE

## 2023-05-30 VITALS
DIASTOLIC BLOOD PRESSURE: 84 MMHG | RESPIRATION RATE: 18 BRPM | OXYGEN SATURATION: 99 % | HEIGHT: 63 IN | SYSTOLIC BLOOD PRESSURE: 146 MMHG | HEART RATE: 74 BPM | TEMPERATURE: 99 F | WEIGHT: 169.98 LBS

## 2023-05-30 DIAGNOSIS — R42 DIZZINESS AND GIDDINESS: ICD-10-CM

## 2023-05-30 DIAGNOSIS — F41.9 ANXIETY DISORDER, UNSPECIFIED: ICD-10-CM

## 2023-05-30 DIAGNOSIS — Z90.89 ACQUIRED ABSENCE OF OTHER ORGANS: ICD-10-CM

## 2023-05-30 DIAGNOSIS — R55 SYNCOPE AND COLLAPSE: ICD-10-CM

## 2023-05-30 DIAGNOSIS — K11.9 DISEASE OF SALIVARY GLAND, UNSPECIFIED: Chronic | ICD-10-CM

## 2023-05-30 DIAGNOSIS — C80.1 MALIGNANT (PRIMARY) NEOPLASM, UNSPECIFIED: Chronic | ICD-10-CM

## 2023-05-30 DIAGNOSIS — Z85.858 PERSONAL HISTORY OF MALIGNANT NEOPLASM OF OTHER ENDOCRINE GLANDS: ICD-10-CM

## 2023-05-30 DIAGNOSIS — M12.9 ARTHROPATHY, UNSPECIFIED: Chronic | ICD-10-CM

## 2023-05-30 DIAGNOSIS — Z92.21 PERSONAL HISTORY OF ANTINEOPLASTIC CHEMOTHERAPY: ICD-10-CM

## 2023-05-30 DIAGNOSIS — Z90.89 ACQUIRED ABSENCE OF OTHER ORGANS: Chronic | ICD-10-CM

## 2023-05-30 DIAGNOSIS — R11.10 VOMITING, UNSPECIFIED: ICD-10-CM

## 2023-05-30 DIAGNOSIS — C50.919 MALIGNANT NEOPLASM OF UNSPECIFIED SITE OF UNSPECIFIED FEMALE BREAST: Chronic | ICD-10-CM

## 2023-05-30 DIAGNOSIS — I10 ESSENTIAL (PRIMARY) HYPERTENSION: ICD-10-CM

## 2023-05-30 DIAGNOSIS — Z85.3 PERSONAL HISTORY OF MALIGNANT NEOPLASM OF BREAST: ICD-10-CM

## 2023-05-30 DIAGNOSIS — Z98.890 OTHER SPECIFIED POSTPROCEDURAL STATES: Chronic | ICD-10-CM

## 2023-05-30 DIAGNOSIS — E03.9 HYPOTHYROIDISM, UNSPECIFIED: ICD-10-CM

## 2023-05-30 DIAGNOSIS — J44.9 CHRONIC OBSTRUCTIVE PULMONARY DISEASE, UNSPECIFIED: ICD-10-CM

## 2023-05-30 LAB
BASOPHILS # BLD AUTO: 0.06 K/UL — SIGNIFICANT CHANGE UP (ref 0–0.2)
BASOPHILS NFR BLD AUTO: 0.7 % — SIGNIFICANT CHANGE UP (ref 0–2)
EOSINOPHIL # BLD AUTO: 0.19 K/UL — SIGNIFICANT CHANGE UP (ref 0–0.5)
EOSINOPHIL NFR BLD AUTO: 2.3 % — SIGNIFICANT CHANGE UP (ref 0–6)
HCT VFR BLD CALC: 33.6 % — LOW (ref 34.5–45)
HGB BLD-MCNC: 11.1 G/DL — LOW (ref 11.5–15.5)
IMM GRANULOCYTES NFR BLD AUTO: 0.2 % — SIGNIFICANT CHANGE UP (ref 0–0.9)
LYMPHOCYTES # BLD AUTO: 1.42 K/UL — SIGNIFICANT CHANGE UP (ref 1–3.3)
LYMPHOCYTES # BLD AUTO: 17.1 % — SIGNIFICANT CHANGE UP (ref 13–44)
MCHC RBC-ENTMCNC: 28.2 PG — SIGNIFICANT CHANGE UP (ref 27–34)
MCHC RBC-ENTMCNC: 33 GM/DL — SIGNIFICANT CHANGE UP (ref 32–36)
MCV RBC AUTO: 85.3 FL — SIGNIFICANT CHANGE UP (ref 80–100)
MONOCYTES # BLD AUTO: 0.77 K/UL — SIGNIFICANT CHANGE UP (ref 0–0.9)
MONOCYTES NFR BLD AUTO: 9.3 % — SIGNIFICANT CHANGE UP (ref 2–14)
NEUTROPHILS # BLD AUTO: 5.83 K/UL — SIGNIFICANT CHANGE UP (ref 1.8–7.4)
NEUTROPHILS NFR BLD AUTO: 70.4 % — SIGNIFICANT CHANGE UP (ref 43–77)
PLATELET # BLD AUTO: 229 K/UL — SIGNIFICANT CHANGE UP (ref 150–400)
RBC # BLD: 3.94 M/UL — SIGNIFICANT CHANGE UP (ref 3.8–5.2)
RBC # FLD: 12.8 % — SIGNIFICANT CHANGE UP (ref 10.3–14.5)
WBC # BLD: 8.29 K/UL — SIGNIFICANT CHANGE UP (ref 3.8–10.5)
WBC # FLD AUTO: 8.29 K/UL — SIGNIFICANT CHANGE UP (ref 3.8–10.5)

## 2023-05-30 PROCEDURE — 70450 CT HEAD/BRAIN W/O DYE: CPT | Mod: MA

## 2023-05-30 PROCEDURE — 71045 X-RAY EXAM CHEST 1 VIEW: CPT | Mod: 26

## 2023-05-30 PROCEDURE — 93005 ELECTROCARDIOGRAM TRACING: CPT

## 2023-05-30 PROCEDURE — 84484 ASSAY OF TROPONIN QUANT: CPT

## 2023-05-30 PROCEDURE — 99285 EMERGENCY DEPT VISIT HI MDM: CPT

## 2023-05-30 PROCEDURE — 36415 COLL VENOUS BLD VENIPUNCTURE: CPT

## 2023-05-30 PROCEDURE — 85025 COMPLETE CBC W/AUTO DIFF WBC: CPT

## 2023-05-30 PROCEDURE — 99285 EMERGENCY DEPT VISIT HI MDM: CPT | Mod: 25

## 2023-05-30 PROCEDURE — 80053 COMPREHEN METABOLIC PANEL: CPT

## 2023-05-30 PROCEDURE — 71045 X-RAY EXAM CHEST 1 VIEW: CPT

## 2023-05-30 PROCEDURE — 93010 ELECTROCARDIOGRAM REPORT: CPT

## 2023-05-30 RX ORDER — SODIUM CHLORIDE 9 MG/ML
1000 INJECTION INTRAMUSCULAR; INTRAVENOUS; SUBCUTANEOUS ONCE
Refills: 0 | Status: COMPLETED | OUTPATIENT
Start: 2023-05-30 | End: 2023-05-30

## 2023-05-30 RX ADMIN — SODIUM CHLORIDE 1000 MILLILITER(S): 9 INJECTION INTRAMUSCULAR; INTRAVENOUS; SUBCUTANEOUS at 23:33

## 2023-05-30 NOTE — ED ADULT NURSE NOTE - OBJECTIVE STATEMENT
patient BIBA s/p witnessed syncope while at work, patient was assisted to grounds by co-workers at [employer]. Neg head strike. Pt reports nausea and not feeling well for the past 4 days. Pt began work 2 days ago , states she was feeling hot prior to syncopal episode.

## 2023-05-30 NOTE — ED ADULT TRIAGE NOTE - AS TEMP SITE
Spoke to patient who reports she is seeing white squiggly things. Symptom started this morning. Stated this is not like her ocular migraines a few weeks ago. Patient advised to seek urgent care if needed. Patient stated she will wait until tomorrow, if not better, she will come to [facility] - ED. Provider aware and in agreement.
oral

## 2023-05-30 NOTE — ED ADULT TRIAGE NOTE - CHIEF COMPLAINT QUOTE
patient s/p witnessed syncope while at work, patient was caught by co-workers.  did not hit head. pt reports nausea and not feeling well for the past 4 days.

## 2023-05-30 NOTE — ED ADULT NURSE NOTE - NSFALLHARMRISKINTERV_ED_ALL_ED
Assistance OOB with selected safe patient handling equipment if applicable/Communicate risk of Fall with Harm to all staff, patient, and family/Orthostatic vital signs/Provide visual cue: red socks, yellow wristband, yellow gown, etc/Reinforce activity limits and safety measures with patient and family/Bed in lowest position, wheels locked, appropriate side rails in place/Call bell, personal items and telephone in reach/Instruct patient to call for assistance before getting out of bed/chair/stretcher/Non-slip footwear applied when patient is off stretcher/[place] to call system/Physically safe environment - no spills, clutter or unnecessary equipment/Purposeful Proactive Rounding/Room/bathroom lighting operational, light cord in reach

## 2023-05-30 NOTE — ED ADULT NURSE NOTE - NS ED NURSE LEVEL OF CONSCIOUSNESS MENTAL STATUS
Awake/Alert
Pt has had paracentesis x2, both showing elevated PMN's concerning for SBP. Initially treated with INVANZ and Flagyl and now with Meropenem. Cultures remain negative. Will continue to treat. Consider ID consult

## 2023-05-31 VITALS
SYSTOLIC BLOOD PRESSURE: 140 MMHG | HEART RATE: 80 BPM | TEMPERATURE: 98 F | OXYGEN SATURATION: 100 % | DIASTOLIC BLOOD PRESSURE: 89 MMHG | RESPIRATION RATE: 18 BRPM

## 2023-05-31 LAB
ALBUMIN SERPL ELPH-MCNC: 3.1 G/DL — LOW (ref 3.3–5)
ALP SERPL-CCNC: 74 U/L — SIGNIFICANT CHANGE UP (ref 40–120)
ALT FLD-CCNC: 13 U/L — SIGNIFICANT CHANGE UP (ref 12–78)
ANION GAP SERPL CALC-SCNC: 5 MMOL/L — SIGNIFICANT CHANGE UP (ref 5–17)
AST SERPL-CCNC: 15 U/L — SIGNIFICANT CHANGE UP (ref 15–37)
BILIRUB SERPL-MCNC: 0.2 MG/DL — SIGNIFICANT CHANGE UP (ref 0.2–1.2)
BUN SERPL-MCNC: 20 MG/DL — SIGNIFICANT CHANGE UP (ref 7–23)
CALCIUM SERPL-MCNC: 8.5 MG/DL — SIGNIFICANT CHANGE UP (ref 8.5–10.1)
CHLORIDE SERPL-SCNC: 109 MMOL/L — HIGH (ref 96–108)
CO2 SERPL-SCNC: 28 MMOL/L — SIGNIFICANT CHANGE UP (ref 22–31)
CREAT SERPL-MCNC: 1.12 MG/DL — SIGNIFICANT CHANGE UP (ref 0.5–1.3)
EGFR: 53 ML/MIN/1.73M2 — LOW
GLUCOSE SERPL-MCNC: 86 MG/DL — SIGNIFICANT CHANGE UP (ref 70–99)
POTASSIUM SERPL-MCNC: 3.6 MMOL/L — SIGNIFICANT CHANGE UP (ref 3.5–5.3)
POTASSIUM SERPL-SCNC: 3.6 MMOL/L — SIGNIFICANT CHANGE UP (ref 3.5–5.3)
PROT SERPL-MCNC: 6.4 GM/DL — SIGNIFICANT CHANGE UP (ref 6–8.3)
SODIUM SERPL-SCNC: 142 MMOL/L — SIGNIFICANT CHANGE UP (ref 135–145)
TROPONIN I, HIGH SENSITIVITY RESULT: 6.62 NG/L — SIGNIFICANT CHANGE UP

## 2023-05-31 PROCEDURE — 70450 CT HEAD/BRAIN W/O DYE: CPT | Mod: 26,MA

## 2023-05-31 NOTE — ED PROVIDER NOTE - CLINICAL SUMMARY MEDICAL DECISION MAKING FREE TEXT BOX
72-year-old female status post likely vasovagal syncope secondary to injury dehydration.  We will also check labs and including troponin as well as EKG, chest x-ray and give IV fluids.  All labs noted as well as imaging and chest x-ray and EKG.  Patient continues to feel well and wishes to go home.  Will discharge.
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
## 2023-05-31 NOTE — ED PROVIDER NOTE - PHYSICAL EXAMINATION
***GEN - NAD; well appearing; A+O x3 ***HEAD - NC/AT ***EYES/NOSE - PERRL, EOMI, mucous membranes moist, no discharge ***THROAT: Oral cavity and pharynx normal. No inflammation, swelling, exudate, or lesions.  ***NECK: Neck supple, non-tender   ***PULMONARY - CTA b/l, symmetric breath sounds. ***CARDIAC -s1s2, RRR, no M,G,R  ***ABDOMEN - +BS, ND, NT, soft, no guarding, no rebound, no masses   ***BACK - no CVA tenderness, Normal  spine ***EXTREMITIES - symmetric pulses, 2+ dp, capillary refill < 2 seconds  ***SKIN - no rash or bruising   ***NEUROLOGIC - alert, CN 2-12 intact. 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

## 2023-05-31 NOTE — ED PROVIDER NOTE - NSFOLLOWUPINSTRUCTIONS_ED_ALL_ED_FT
please return for any new or worsening symptoms.  Please follow-up with your primary care doctor this week.

## 2023-05-31 NOTE — ED PROVIDER NOTE - NSICDXPASTSURGICALHX_GEN_ALL_CORE_FT
PAST SURGICAL HISTORY:  Arthropathy b/l knees (one in 2005 and other side in 2010)    Breast cancer 2002 right lumpectomy with post op chemotherapy and RT s/p lymph node dissection    Cancer [id number] inserted for chemotherapy in 2002 and then removed when chemotherapy completed    History of surgery left brow lift 7/2019    Mass of left parotid gland Excision of parotid mass with left neck dissection & graft 3/2019    Parotid mass diagnosed in 2007 with excision of left parotid ("adenoid cystic carcinoma") with post op RT    S/P tonsillectomy

## 2023-06-01 ENCOUNTER — APPOINTMENT (OUTPATIENT)
Dept: FAMILY MEDICINE | Facility: CLINIC | Age: 73
End: 2023-06-01
Payer: MEDICARE

## 2023-06-01 VITALS
HEIGHT: 62.5 IN | WEIGHT: 163 LBS | SYSTOLIC BLOOD PRESSURE: 140 MMHG | HEART RATE: 84 BPM | TEMPERATURE: 97.1 F | OXYGEN SATURATION: 97 % | DIASTOLIC BLOOD PRESSURE: 90 MMHG | BODY MASS INDEX: 29.25 KG/M2 | RESPIRATION RATE: 16 BRPM

## 2023-06-01 VITALS — SYSTOLIC BLOOD PRESSURE: 136 MMHG | DIASTOLIC BLOOD PRESSURE: 80 MMHG

## 2023-06-01 DIAGNOSIS — R10.811 RIGHT UPPER QUADRANT ABDOMINAL TENDERNESS: ICD-10-CM

## 2023-06-01 DIAGNOSIS — R55 SYNCOPE AND COLLAPSE: ICD-10-CM

## 2023-06-01 DIAGNOSIS — D64.9 ANEMIA, UNSPECIFIED: ICD-10-CM

## 2023-06-01 PROCEDURE — 99214 OFFICE O/P EST MOD 30 MIN: CPT

## 2023-06-01 NOTE — PHYSICAL EXAM
[Soft] : abdomen soft [Normal] : affect was normal and insight and judgment were intact [de-identified] : RUQ mild abd tenderness

## 2023-06-01 NOTE — HISTORY OF PRESENT ILLNESS
[FreeTextEntry8] : Pt had syncopal episode on 5/30/23. 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 Pt went to [facility], ekg was wnl, CT head wnl. Pt was found to be anemic on labs.

## 2023-06-20 ENCOUNTER — APPOINTMENT (OUTPATIENT)
Dept: INTERNAL MEDICINE | Facility: CLINIC | Age: 73
End: 2023-06-20
Payer: MEDICARE

## 2023-06-20 ENCOUNTER — NON-APPOINTMENT (OUTPATIENT)
Age: 73
End: 2023-06-20

## 2023-06-20 VITALS
HEIGHT: 62.5 IN | TEMPERATURE: 98.2 F | OXYGEN SATURATION: 95 % | HEART RATE: 71 BPM | DIASTOLIC BLOOD PRESSURE: 86 MMHG | SYSTOLIC BLOOD PRESSURE: 120 MMHG

## 2023-06-20 DIAGNOSIS — J01.90 ACUTE SINUSITIS, UNSPECIFIED: ICD-10-CM

## 2023-06-20 PROCEDURE — 99212 OFFICE O/P EST SF 10 MIN: CPT

## 2023-06-20 NOTE — HEALTH RISK ASSESSMENT
[No] : No [Never (0 pts)] : Never (0 points) [Audit-CScore] : 0 [Former] : Former [20 or more] : 20 or more [< 15 Years] : < 15 Years [de-identified] : quit 2013

## 2023-06-20 NOTE — REVIEW OF SYSTEMS
[Chills] : chills [Fatigue] : fatigue [Nasal Discharge] : nasal discharge [Sore Throat] : sore throat [Postnasal Drip] : postnasal drip [Headache] : headache [Negative] : Respiratory [Fever] : no fever [Hot Flashes] : no hot flashes [Night Sweats] : no night sweats [Recent Change In Weight] : ~T no recent weight change [Earache] : no earache [Hearing Loss] : no hearing loss [Nosebleed] : no nosebleeds [Hoarseness] : no hoarseness [Dizziness] : no dizziness [Fainting] : no fainting [Confusion] : no confusion [Memory Loss] : no memory loss [Unsteady Walking] : no ataxia

## 2023-06-20 NOTE — PLAN
[FreeTextEntry1] : 72F PMH COPD, HTN, breast cancer and recurrent head and neck cancer presents for symptoms of sinus congestion, sore throat and green phlegm for one day. \par \par # acute sinusitis\par - sinus congestion, sore throat and green phlegm for one day\par - denies any fevers, myalgias, chest pain, shortness of breath\par - afebrile, vitals stable\par - on exam, lungs are clear with no wheezing or crackles, patient able to speak in full sentences and she is in no acute distress\par - given history of COPD and recurrent cancers will prescribe doxycycline 100mg BID for 7 days\par - counseled on supportive care with warm liquids, steam inhalation, rest, and hydration. Patient verbalized understanding\par - work note given

## 2023-06-20 NOTE — HISTORY OF PRESENT ILLNESS
[FreeTextEntry8] : 72F PMH COPD, HTN, breast cancer and recurrent head and neck cancer presents for symptoms of sinus congestion, sore throat and green phlegm for one day. She notes sick contacts being her son and friend. She denies any fevers, or myalgias but admits to having chills and fatigue. She took an advil and an allergy pill for her symptoms with minimal relief. She denies any chest pain and shortness of breath. She is covid vaccinated but not flu vaccinated. She denies any recent travel.

## 2023-06-20 NOTE — PHYSICAL EXAM
[Normal] : no posterior cervical lymphadenopathy and no anterior cervical lymphadenopathy [de-identified] : left facial droop due to head and neck cancer surgery [de-identified] : left facial droop due to head and neck cancer surgery [de-identified] : left facial droop due to head and neck cancer surgery

## 2023-06-28 ENCOUNTER — RESULT REVIEW (OUTPATIENT)
Age: 73
End: 2023-06-28

## 2023-07-15 ENCOUNTER — APPOINTMENT (OUTPATIENT)
Dept: FAMILY MEDICINE | Facility: CLINIC | Age: 73
End: 2023-07-15
Payer: MEDICARE

## 2023-07-15 PROCEDURE — 99213 OFFICE O/P EST LOW 20 MIN: CPT | Mod: 95

## 2023-07-15 RX ORDER — PREDNISONE 20 MG/1
20 TABLET ORAL
Qty: 10 | Refills: 0 | Status: ACTIVE | COMMUNITY
Start: 2023-01-16 | End: 1900-01-01

## 2023-07-15 NOTE — PHYSICAL EXAM
[No Acute Distress] : no acute distress [Well Nourished] : well nourished [Well Developed] : well developed [No Respiratory Distress] : no respiratory distress  [No Accessory Muscle Use] : no accessory muscle use [Alert and Oriented x3] : oriented to person, place, and time [de-identified] : appears congested

## 2023-07-15 NOTE — ASSESSMENT
[FreeTextEntry1] : take Doxycycline as directed\par recommend Flonase nasal spray\par c/w Albuterol either via inhaler or nebulizer when needed\par if symptoms worsen- start Prednisone \par recommend home COVID testing

## 2023-07-15 NOTE — HISTORY OF PRESENT ILLNESS
[Home] : at home, [unfilled] , at the time of the visit. 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 She c/o associated sinus headache. She reports yellow sputum production. Also with a sore throat and slight cough, feeling fatigued. She has a nebulizer at home to use as needed. She denies any worsening shortness of breath. Has h/o recurrent sinusitis- recently treated with antibiotics last month. She has scheduled an appointment to see an immunologist.

## 2023-07-15 NOTE — REVIEW OF SYSTEMS
[Cough] : cough [Fever] : no fever [Chills] : no chills [Shortness Of Breath] : no shortness of breath [FreeTextEntry4] : as per HPI

## 2023-08-08 ENCOUNTER — APPOINTMENT (OUTPATIENT)
Dept: CT IMAGING | Facility: CLINIC | Age: 73
End: 2023-08-08
Payer: MEDICARE

## 2023-08-08 ENCOUNTER — APPOINTMENT (OUTPATIENT)
Dept: MRI IMAGING | Facility: CLINIC | Age: 73
End: 2023-08-08
Payer: MEDICARE

## 2023-08-08 ENCOUNTER — OUTPATIENT (OUTPATIENT)
Dept: OUTPATIENT SERVICES | Facility: HOSPITAL | Age: 73
LOS: 1 days | End: 2023-08-08
Payer: MEDICARE

## 2023-08-08 DIAGNOSIS — K11.9 DISEASE OF SALIVARY GLAND, UNSPECIFIED: Chronic | ICD-10-CM

## 2023-08-08 DIAGNOSIS — Z90.89 ACQUIRED ABSENCE OF OTHER ORGANS: Chronic | ICD-10-CM

## 2023-08-08 DIAGNOSIS — M12.9 ARTHROPATHY, UNSPECIFIED: Chronic | ICD-10-CM

## 2023-08-08 DIAGNOSIS — C07 MALIGNANT NEOPLASM OF PAROTID GLAND: ICD-10-CM

## 2023-08-08 DIAGNOSIS — Z98.890 OTHER SPECIFIED POSTPROCEDURAL STATES: Chronic | ICD-10-CM

## 2023-08-08 DIAGNOSIS — C80.1 MALIGNANT (PRIMARY) NEOPLASM, UNSPECIFIED: Chronic | ICD-10-CM

## 2023-08-08 DIAGNOSIS — C50.919 MALIGNANT NEOPLASM OF UNSPECIFIED SITE OF UNSPECIFIED FEMALE BREAST: Chronic | ICD-10-CM

## 2023-08-08 PROCEDURE — A9585: CPT

## 2023-08-08 PROCEDURE — 71250 CT THORAX DX C-: CPT | Mod: 26

## 2023-08-08 PROCEDURE — 70543 MRI ORBT/FAC/NCK W/O &W/DYE: CPT

## 2023-08-08 PROCEDURE — 70543 MRI ORBT/FAC/NCK W/O &W/DYE: CPT | Mod: 26

## 2023-08-08 PROCEDURE — 71250 CT THORAX DX C-: CPT

## 2023-08-16 ENCOUNTER — APPOINTMENT (OUTPATIENT)
Dept: OTOLARYNGOLOGY | Facility: CLINIC | Age: 73
End: 2023-08-16
Payer: MEDICARE

## 2023-08-16 VITALS
WEIGHT: 160 LBS | HEART RATE: 69 BPM | DIASTOLIC BLOOD PRESSURE: 92 MMHG | SYSTOLIC BLOOD PRESSURE: 151 MMHG | BODY MASS INDEX: 28.71 KG/M2 | HEIGHT: 62.5 IN

## 2023-08-16 PROCEDURE — 99214 OFFICE O/P EST MOD 30 MIN: CPT

## 2023-08-16 NOTE — CONSULT LETTER
[Dear  ___] : Dear  [unfilled], [Courtesy Letter:] : I had the pleasure of seeing your patient, [unfilled], in my office today. [Please see my note below.] : Please see my note below. [Sincerely,] : Sincerely, [FreeTextEntry2] : Clyde Contreras MD ([place], [place]) [FreeTextEntry3] : Titi Richardson MD, FACS\par  \par     [facility]\par  Associate Chair\par     Department of Otolaryngology\par  \par  Professor\par  Otolaryngology & Molecular Medicine\par  [employer]\par

## 2023-08-16 NOTE — HISTORY OF PRESENT ILLNESS
[de-identified] : Ms. Roberto presents for follow up. 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 Recon facial reanimation and nerve grafting with . Pt had consultation with  regarding RT, pt refused tx.  8/8/2023 MRI neck and Ct chest

## 2023-08-19 ENCOUNTER — APPOINTMENT (OUTPATIENT)
Dept: FAMILY MEDICINE | Facility: CLINIC | Age: 73
End: 2023-08-19
Payer: MEDICARE

## 2023-08-19 ENCOUNTER — TRANSCRIPTION ENCOUNTER (OUTPATIENT)
Age: 73
End: 2023-08-19

## 2023-08-19 DIAGNOSIS — J01.90 ACUTE SINUSITIS, UNSPECIFIED: ICD-10-CM

## 2023-08-19 PROCEDURE — 99213 OFFICE O/P EST LOW 20 MIN: CPT | Mod: 95

## 2023-08-19 RX ORDER — DOXYCYCLINE HYCLATE 100 MG/1
100 CAPSULE ORAL TWICE DAILY
Qty: 14 | Refills: 0 | Status: ACTIVE | COMMUNITY
Start: 2022-10-06 | End: 1900-01-01

## 2023-08-19 NOTE — HISTORY OF PRESENT ILLNESS
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 She reports green sputum production. Also with a sore throat and slight cough, feeling fatigued. She has a nebulizer at home to use as needed. She denies any worsening shortness of breath. Has h/o recurrent sinusitis- recently treated with antibiotics last month.  She did see immunology last month all labs normal.  [Home] : at home, [unfilled] , at the time of the visit. [Medical Office: ([facility])___] : at the medical office located in  [Verbal consent obtained from patient] : the patient, [unfilled]

## 2023-08-19 NOTE — PHYSICAL EXAM
[No Acute Distress] : no acute distress [Well Nourished] : well nourished [Well Developed] : well developed [Alert and Oriented x3] : oriented to person, place, and time [de-identified] : appears congested

## 2023-08-19 NOTE — ASSESSMENT
[FreeTextEntry1] : take Doxycycline as directed recommend Flonase nasal spray c/w Albuterol either via inhaler or nebulizer when needed declined prednisone   Home COVID swab negative

## 2023-08-19 NOTE — REVIEW OF SYSTEMS
[Fever] : no fever [Chills] : no chills [Shortness Of Breath] : no shortness of breath [Cough] : cough [FreeTextEntry4] : as per HPI

## 2023-08-30 ENCOUNTER — RESULT REVIEW (OUTPATIENT)
Age: 73
End: 2023-08-30

## 2023-09-06 ENCOUNTER — APPOINTMENT (OUTPATIENT)
Dept: ULTRASOUND IMAGING | Facility: IMAGING CENTER | Age: 73
End: 2023-09-06
Payer: MEDICARE

## 2023-09-06 ENCOUNTER — RESULT REVIEW (OUTPATIENT)
Age: 73
End: 2023-09-06

## 2023-09-06 ENCOUNTER — OUTPATIENT (OUTPATIENT)
Dept: OUTPATIENT SERVICES | Facility: HOSPITAL | Age: 73
LOS: 1 days | End: 2023-09-06
Payer: MEDICARE

## 2023-09-06 DIAGNOSIS — K11.9 DISEASE OF SALIVARY GLAND, UNSPECIFIED: Chronic | ICD-10-CM

## 2023-09-06 DIAGNOSIS — C50.919 MALIGNANT NEOPLASM OF UNSPECIFIED SITE OF UNSPECIFIED FEMALE BREAST: Chronic | ICD-10-CM

## 2023-09-06 DIAGNOSIS — C80.1 MALIGNANT (PRIMARY) NEOPLASM, UNSPECIFIED: Chronic | ICD-10-CM

## 2023-09-06 DIAGNOSIS — C07 MALIGNANT NEOPLASM OF PAROTID GLAND: ICD-10-CM

## 2023-09-06 DIAGNOSIS — Z98.890 OTHER SPECIFIED POSTPROCEDURAL STATES: Chronic | ICD-10-CM

## 2023-09-06 DIAGNOSIS — M12.9 ARTHROPATHY, UNSPECIFIED: Chronic | ICD-10-CM

## 2023-09-06 DIAGNOSIS — Z00.8 ENCOUNTER FOR OTHER GENERAL EXAMINATION: ICD-10-CM

## 2023-09-06 DIAGNOSIS — Z90.89 ACQUIRED ABSENCE OF OTHER ORGANS: Chronic | ICD-10-CM

## 2023-09-06 PROCEDURE — 88360 TUMOR IMMUNOHISTOCHEM/MANUAL: CPT | Mod: 26

## 2023-09-06 PROCEDURE — 88173 CYTOPATH EVAL FNA REPORT: CPT | Mod: 26

## 2023-09-06 PROCEDURE — 10006 FNA BX W/US GDN EA ADDL: CPT | Mod: LT

## 2023-09-06 PROCEDURE — 10005 FNA BX W/US GDN 1ST LES: CPT | Mod: LT

## 2023-09-06 PROCEDURE — 88305 TISSUE EXAM BY PATHOLOGIST: CPT | Mod: 26

## 2023-09-07 PROCEDURE — 10005 FNA BX W/US GDN 1ST LES: CPT

## 2023-09-07 PROCEDURE — 36415 COLL VENOUS BLD VENIPUNCTURE: CPT

## 2023-09-07 PROCEDURE — 88305 TISSUE EXAM BY PATHOLOGIST: CPT

## 2023-09-07 PROCEDURE — 88360 TUMOR IMMUNOHISTOCHEM/MANUAL: CPT

## 2023-09-07 PROCEDURE — 88172 CYTP DX EVAL FNA 1ST EA SITE: CPT

## 2023-09-07 PROCEDURE — 88173 CYTOPATH EVAL FNA REPORT: CPT

## 2023-09-12 NOTE — ED ADULT TRIAGE NOTE - NS ED TRIAGE AVPU SCALE
12-Sep-2023 19:00 Alert-The patient is alert, awake and responds to voice. The patient is oriented to time, place, and person. The triage nurse is able to obtain subjective information.

## 2023-09-13 NOTE — REVIEW OF SYSTEMS
[Negative] : Heme/Lymph
patient with abdominal bloating and diarrhea for 3 weeks, improving but not resolved, abdomen is soft and nontender doubt  appendicitis or colitis, consider possible IBS, presented today with lightheadedness which improved after fluids, possibly somewhat dehydrated, also had skipped eating today, ketones in urine otherwise labs are unremarkable, has GI appointment on Friday.   EKG with no dysrhythmia and is also reassuring. Discussed emergent return instructions,

## 2023-09-14 LAB — NON-GYNECOLOGICAL CYTOLOGY STUDY: SIGNIFICANT CHANGE UP

## 2023-10-02 ENCOUNTER — APPOINTMENT (OUTPATIENT)
Dept: NUCLEAR MEDICINE | Facility: CLINIC | Age: 73
End: 2023-10-02
Payer: MEDICARE

## 2023-10-02 ENCOUNTER — OUTPATIENT (OUTPATIENT)
Dept: OUTPATIENT SERVICES | Facility: HOSPITAL | Age: 73
LOS: 1 days | End: 2023-10-02

## 2023-10-02 DIAGNOSIS — M12.9 ARTHROPATHY, UNSPECIFIED: Chronic | ICD-10-CM

## 2023-10-02 DIAGNOSIS — Z90.89 ACQUIRED ABSENCE OF OTHER ORGANS: Chronic | ICD-10-CM

## 2023-10-02 DIAGNOSIS — Z98.890 OTHER SPECIFIED POSTPROCEDURAL STATES: Chronic | ICD-10-CM

## 2023-10-02 DIAGNOSIS — Z00.8 ENCOUNTER FOR OTHER GENERAL EXAMINATION: ICD-10-CM

## 2023-10-02 DIAGNOSIS — K11.9 DISEASE OF SALIVARY GLAND, UNSPECIFIED: Chronic | ICD-10-CM

## 2023-10-02 DIAGNOSIS — C80.1 MALIGNANT (PRIMARY) NEOPLASM, UNSPECIFIED: Chronic | ICD-10-CM

## 2023-10-02 DIAGNOSIS — C50.919 MALIGNANT NEOPLASM OF UNSPECIFIED SITE OF UNSPECIFIED FEMALE BREAST: Chronic | ICD-10-CM

## 2023-10-02 PROCEDURE — 78815 PET IMAGE W/CT SKULL-THIGH: CPT | Mod: 26,PI

## 2023-10-31 ENCOUNTER — APPOINTMENT (OUTPATIENT)
Dept: PHYSICAL MEDICINE AND REHAB | Facility: CLINIC | Age: 73
End: 2023-10-31

## 2023-10-31 NOTE — CONSULT LETTER
What Type Of Note Output Would You Prefer (Optional)?: Standard Output [Dear  ___] : Dear  [unfilled], How Severe Are Your Spot(S)?: mild [Courtesy Letter:] : I had the pleasure of seeing your patient, [unfilled], in my office today. Have Your Spot(S) Been Treated In The Past?: has not been treated [Please see my note below.] : Please see my note below. Hpi Title: Evaluation of Skin Lesions [Sincerely,] : Sincerely, [FreeTextEntry2] : Clyde Contreras MD ([place], [place]) [FreeTextEntry3] : Titi Richardson MD

## 2023-11-03 ENCOUNTER — APPOINTMENT (OUTPATIENT)
Dept: FAMILY MEDICINE | Facility: CLINIC | Age: 73
End: 2023-11-03
Payer: MEDICARE

## 2023-11-03 VITALS
HEIGHT: 62.5 IN | WEIGHT: 160 LBS | SYSTOLIC BLOOD PRESSURE: 150 MMHG | OXYGEN SATURATION: 96 % | RESPIRATION RATE: 16 BRPM | DIASTOLIC BLOOD PRESSURE: 80 MMHG | HEART RATE: 107 BPM | BODY MASS INDEX: 28.71 KG/M2

## 2023-11-03 DIAGNOSIS — F41.9 ANXIETY DISORDER, UNSPECIFIED: ICD-10-CM

## 2023-11-03 DIAGNOSIS — F32.A ANXIETY DISORDER, UNSPECIFIED: ICD-10-CM

## 2023-11-03 PROCEDURE — 99214 OFFICE O/P EST MOD 30 MIN: CPT

## 2023-11-14 ENCOUNTER — APPOINTMENT (OUTPATIENT)
Dept: PHYSICAL MEDICINE AND REHAB | Facility: CLINIC | Age: 73
End: 2023-11-14
Payer: MEDICARE

## 2023-11-14 VITALS
DIASTOLIC BLOOD PRESSURE: 78 MMHG | SYSTOLIC BLOOD PRESSURE: 131 MMHG | BODY MASS INDEX: 28.71 KG/M2 | HEIGHT: 62.5 IN | OXYGEN SATURATION: 95 % | HEART RATE: 83 BPM | WEIGHT: 160 LBS

## 2023-11-14 PROCEDURE — 99214 OFFICE O/P EST MOD 30 MIN: CPT

## 2023-11-14 RX ORDER — GABAPENTIN 300 MG/1
300 CAPSULE ORAL
Qty: 90 | Refills: 0 | Status: ACTIVE | COMMUNITY
Start: 2023-11-14 | End: 1900-01-01

## 2023-11-28 ENCOUNTER — RX RENEWAL (OUTPATIENT)
Age: 73
End: 2023-11-28

## 2023-12-12 ENCOUNTER — APPOINTMENT (OUTPATIENT)
Dept: PHYSICAL MEDICINE AND REHAB | Facility: CLINIC | Age: 73
End: 2023-12-12
Payer: MEDICARE

## 2023-12-12 VITALS
OXYGEN SATURATION: 96 % | SYSTOLIC BLOOD PRESSURE: 178 MMHG | DIASTOLIC BLOOD PRESSURE: 80 MMHG | HEART RATE: 82 BPM | HEIGHT: 62 IN | WEIGHT: 160 LBS | BODY MASS INDEX: 29.44 KG/M2

## 2023-12-12 DIAGNOSIS — M79.651 PAIN IN RIGHT THIGH: ICD-10-CM

## 2023-12-12 PROCEDURE — 99214 OFFICE O/P EST MOD 30 MIN: CPT

## 2023-12-12 NOTE — ASSESSMENT
[FreeTextEntry1] : Ms. JOSH PACHECO is a 73 year old female who presents with exacerbation of low back pain due to underlying stenosis/radiculopathy but now with a seperate issue of R inner thigh pain after getting out of bed. Says she has had trouble getting in and out of her car due to the pain. Denies any new numbness/tingling. Denies any associated swelling. Pain may be due to an underlying muscle/tendon tear.  Denies any red flag signs. Will recommend: - Patient will try Tylenol up to 3g/day. Given difficulty swallowing, She will take the Tylenol 500mg/15 mL. - Given significant pain, will give Tramadol 50mg BID, #14.  ISTOP checked #[id number]. Patient educated on use of currently prescribed medication, about the intended use, expected outcomes, potential side effects and addictive potential. Was also advised not to take with any other sedatives, including alcohol and/or benzodiazepines (unless otherwise prescribed for psych disorder and benefits outweigh risks) and not to drive and/or operate heavy machinery while under the influence.  - Given severity of pain, will order MRI R thigh to evaluate for muscle/tendon tear.  - Will hold off on LARS for now given acute R thigh pain.   RTC in 1-2 weeks. Patient aware of red flag signs including any changes to their bowel/bladder control, groin numbness or new weakness. Patient knows to seek immediate attention by calling 911 or going to nearest ER if these symptoms appear.

## 2023-12-12 NOTE — HISTORY OF PRESENT ILLNESS
[FreeTextEntry1] : Ms. JOSH PACHECO is a 73 year old  female who presents for follow up. At last visit, she was continued on Voltaren, Tylenol, continued on HEP, recommended to undergo an MRI and ordered an LARS. She was doing okay but then on 12/7/23, she woke up with extreme R inner thigh pain with extensive bruising after getting out of bed. Brusing got slightly better but then returned. She has been having trouble lifting R leg due to pain. Denies any associated swelling. No other new symptoms.   Location:Low back, R inner thigh Onset: Chronic in terms of low back pain, R inner thigh pain started in 12/7/23.  Provocation/Palliative: Back pain is worse from sitting and standing position. R thigh pain worse with lifting leg Quality: Sharp pain, shooting Radiation: Patient does have pain radiating down on the RLE, same as before. Severity: Moderate to severe Timing: Worse in the morning.  No bowel/bladder changes. No groin numbness.

## 2023-12-12 NOTE — PHYSICAL EXAM
[FreeTextEntry1] : PE:  Constitutional: In NAD, calm and cooperative MSK (Back/R Thigh)  Inspection: no gross swelling identified, mild eccymosis over R inner thigh  Palpation: Mild TTP over R lower lumbar paraspinals, mild TTP over R inner thigh over adductors. No calf tednerness. LE pulses equal and intact.   ROM: Pain at end lumbar extension > flexion but AROM intact. R thigh pain with IR of L hip, no significant pain with ER.   Strength: 5/5 strength in bilateral lower extremities except for R hip flexion which is 4/5 due to pain  Reflexes: 2+ Patella reflex bilaterally, 2+ Achilles reflex bilaterally, negative clonus bilaterally  Sensation: Intact to light touch in bilateral lower extremities  Special tests: SLR: equivocal on right, negative on left Facet loading: Positive bilaterally.

## 2023-12-30 ENCOUNTER — NON-APPOINTMENT (OUTPATIENT)
Age: 73
End: 2023-12-30

## 2023-12-30 RX ORDER — TRAMADOL HYDROCHLORIDE 50 MG/1
50 TABLET, COATED ORAL
Qty: 14 | Refills: 0 | Status: ACTIVE | COMMUNITY
Start: 2023-12-30 | End: 1900-01-01

## 2023-12-31 ENCOUNTER — OUTPATIENT (OUTPATIENT)
Dept: OUTPATIENT SERVICES | Facility: HOSPITAL | Age: 73
LOS: 1 days | End: 2023-12-31
Payer: MEDICARE

## 2023-12-31 ENCOUNTER — APPOINTMENT (OUTPATIENT)
Dept: MRI IMAGING | Facility: CLINIC | Age: 73
End: 2023-12-31
Payer: MEDICARE

## 2023-12-31 DIAGNOSIS — Z98.890 OTHER SPECIFIED POSTPROCEDURAL STATES: Chronic | ICD-10-CM

## 2023-12-31 DIAGNOSIS — K11.9 DISEASE OF SALIVARY GLAND, UNSPECIFIED: Chronic | ICD-10-CM

## 2023-12-31 DIAGNOSIS — C50.919 MALIGNANT NEOPLASM OF UNSPECIFIED SITE OF UNSPECIFIED FEMALE BREAST: Chronic | ICD-10-CM

## 2023-12-31 DIAGNOSIS — Z90.89 ACQUIRED ABSENCE OF OTHER ORGANS: Chronic | ICD-10-CM

## 2023-12-31 DIAGNOSIS — M79.651 PAIN IN RIGHT THIGH: ICD-10-CM

## 2023-12-31 DIAGNOSIS — C80.1 MALIGNANT (PRIMARY) NEOPLASM, UNSPECIFIED: Chronic | ICD-10-CM

## 2023-12-31 DIAGNOSIS — M12.9 ARTHROPATHY, UNSPECIFIED: Chronic | ICD-10-CM

## 2023-12-31 PROCEDURE — 73718 MRI LOWER EXTREMITY W/O DYE: CPT

## 2023-12-31 PROCEDURE — 73718 MRI LOWER EXTREMITY W/O DYE: CPT | Mod: 26,RT

## 2024-01-02 ENCOUNTER — APPOINTMENT (OUTPATIENT)
Dept: PHYSICAL MEDICINE AND REHAB | Facility: CLINIC | Age: 74
End: 2024-01-02
Payer: MEDICARE

## 2024-01-02 DIAGNOSIS — M16.7 OTHER UNILATERAL SECONDARY OSTEOARTHRITIS OF HIP: ICD-10-CM

## 2024-01-02 DIAGNOSIS — M54.40 LUMBAGO WITH SCIATICA, UNSPECIFIED SIDE: ICD-10-CM

## 2024-01-02 PROCEDURE — 99214 OFFICE O/P EST MOD 30 MIN: CPT | Mod: 95

## 2024-01-02 NOTE — DATA REVIEWED
[FreeTextEntry1] : 	 EXAM: [id number] - MR LWR EXT NON JOINT RT  - ORDERED BY: HALEY GUSMAN   PROCEDURE DATE:  12/31/2023    INTERPRETATION:  MRI OF THE RIGHT LOWER EXTREMITY  CLINICAL INFORMATION: Anteromedial thigh pain and bruising of 2 weeks' duration.  COMPARISON: None available.  TECHNIQUE: Multiplanar, multisequence imaging of the right lower extremity from the hip through the knee.  FINDINGS:  OSSEOUS: No acute displaced fracture, osteonecrosis, or aggressive neoplasm. Advanced right hip arthrosis with moderate to large volume effusion and subcentimeter detached isointense osteochondral body within the anterior inferomedial femoroacetabular recess. Subchondral fibrocystic change along the anterior superior acetabular rim mild patchy surrounding marrow edema like signal. Moderate femorotibial compartments predominant right knee arthrosis with small volume suprapatellar effusion.  GENERAL:  Focal right iliopsoas tendinitis at its crossing over the supra-acetabular ridge with fluid distended tendon sheath/bursae, but without recent fluid signal intensity tear.  Mild common hamstring tendinosis with subacute grade 2 strain/1.2 cm low to moderate grade partial-thickness interstitial tear at the origin of the semimembranosus slip. Ischiofemoral interval is adequate.  Moderate gluteal tendinopathy including focal mild focal peritendinitis the conjoined distal greater trochanteric insertion of the gluteus minimus and anterior gluteus medius, but without recent fluid signal intensity tear or greater trochanteric bursitis.  No mass lesion, hematoma, distended Baker's popliteal cyst, or drainable encapsulated fluid collection. Few scattered small to moderate size chronic grade 2 strains, most obvious of the gluteus minimus and distal vastus lateralis. Muscle bulk and signal intensity are otherwise within normal limits. No pelvic free fluid.  IMPRESSION:  1.  Advanced right hip arthrosis with effusion and small detached intra-articular osteochondral body. 2.  Right iliopsoas tenosynovitis without recent tear. 3.  Small subacute grade 2 strain/low to moderate grade partial-thickness tear of right proximal semimembranosus. 4.  Moderate right knee arthrosis with small effusion.  --- End of Report ---       DAVEY BOLES MD; Attending Radiologist This document has been electronically signed. Dec 31 2023  6:09PM

## 2024-01-02 NOTE — ASSESSMENT
[FreeTextEntry1] : Ms. JOSH PACHECO is a 73 year old female who presents with exacerbation of low back pain due to underlying stenosis/radiculopathy but now with a seperate issue of R inner thigh pain after getting out of bed. Says she has had trouble getting in and out of her car due to the pain. Denies any new numbness/tingling. Denies any associated swelling. MRI R Hip showed severe R hip OA as well as partial hamstring tearing.  Denies any red flag signs. Will recommend: - Patient will try Tylenol up to 3g/day. Given difficulty swallowing, She will take the Tylenol 500mg/15 mL. - Given significant pain, continue Tramadol 50mg BID PRN.  Patient educated on use of currently prescribed medication, about the intended use, expected outcomes, potential side effects and addictive potential. Was also advised not to take with any other sedatives, including alcohol and/or benzodiazepines (unless otherwise prescribed for psych disorder and benefits outweigh risks) and not to drive and/or operate heavy machinery while under the influence. - Will refer patient to Ortho [facility] for evaluation for CLAY given severe pain. - Will hold off on LARS for now given acute R thigh pain, likely more related to hip than spine.   RTC in in 3-4 weeks. Patient aware of red flag signs including any changes to their bowel/bladder control, groin numbness or new weakness. Patient knows to seek immediate attention by calling 911 or going to nearest ER if these symptoms appear.

## 2024-01-02 NOTE — HISTORY OF PRESENT ILLNESS
[FreeTextEntry1] : This visit was conducted via an audiovisual call. Patient confirmed they were at home at [address] [place] [address] . Dr. Parr was the office at [address] [place] [address] . Patient consented to the televisit.   Ms. JOSH PACHECO is a 73 year old  female who presents for follow up. At last visit, she was ordered an MRI R Thigh, given Tramadol and Tylenol PRN. She is still with significant pain. MRI showed significant R Hip OA along with partial hamstring tear. She is taking Tramdol 50mg BID PRN with some relief. Denies any side effects besides being a little sleepy after taking it.   Location:Low back, R inner thigh Onset: Chronic in terms of low back pain, R inner thigh pain started in 12/7/23. Provocation/Palliative: Back pain is worse from sitting and standing position. R thigh pain worse with lifting leg Quality: Sharp pain, shooting Radiation: Patient does have pain radiating down on the RLE, same as before, but its not her pain complaint.  Severity: severe Timing: Worse in the morning.  No bowel/bladder changes. No groin numbness.

## 2024-01-05 ENCOUNTER — NON-APPOINTMENT (OUTPATIENT)
Age: 74
End: 2024-01-05

## 2024-01-05 RX ORDER — TRAMADOL HYDROCHLORIDE 50 MG/1
50 TABLET, COATED ORAL
Qty: 14 | Refills: 0 | Status: ACTIVE | COMMUNITY
Start: 2023-12-12 | End: 1900-01-01

## 2024-01-09 ENCOUNTER — RX RENEWAL (OUTPATIENT)
Age: 74
End: 2024-01-09

## 2024-01-09 ENCOUNTER — APPOINTMENT (OUTPATIENT)
Dept: ORTHOPEDIC SURGERY | Facility: CLINIC | Age: 74
End: 2024-01-09
Payer: MEDICARE

## 2024-01-09 VITALS
WEIGHT: 160 LBS | BODY MASS INDEX: 29.44 KG/M2 | SYSTOLIC BLOOD PRESSURE: 138 MMHG | HEIGHT: 62 IN | DIASTOLIC BLOOD PRESSURE: 79 MMHG

## 2024-01-09 DIAGNOSIS — M25.551 PAIN IN RIGHT HIP: ICD-10-CM

## 2024-01-09 PROCEDURE — 73502 X-RAY EXAM HIP UNI 2-3 VIEWS: CPT

## 2024-01-09 PROCEDURE — 99205 OFFICE O/P NEW HI 60 MIN: CPT

## 2024-01-15 ENCOUNTER — RESULT REVIEW (OUTPATIENT)
Age: 74
End: 2024-01-15

## 2024-01-16 ENCOUNTER — APPOINTMENT (OUTPATIENT)
Dept: ORTHOPEDIC SURGERY | Facility: CLINIC | Age: 74
End: 2024-01-16

## 2024-01-16 ENCOUNTER — OUTPATIENT (OUTPATIENT)
Dept: OUTPATIENT SERVICES | Facility: HOSPITAL | Age: 74
LOS: 1 days | End: 2024-01-16
Payer: MEDICARE

## 2024-01-16 ENCOUNTER — APPOINTMENT (OUTPATIENT)
Dept: ULTRASOUND IMAGING | Facility: CLINIC | Age: 74
End: 2024-01-16
Payer: MEDICARE

## 2024-01-16 DIAGNOSIS — Z98.890 OTHER SPECIFIED POSTPROCEDURAL STATES: Chronic | ICD-10-CM

## 2024-01-16 DIAGNOSIS — M12.9 ARTHROPATHY, UNSPECIFIED: Chronic | ICD-10-CM

## 2024-01-16 DIAGNOSIS — M16.11 UNILATERAL PRIMARY OSTEOARTHRITIS, RIGHT HIP: ICD-10-CM

## 2024-01-16 DIAGNOSIS — C80.1 MALIGNANT (PRIMARY) NEOPLASM, UNSPECIFIED: Chronic | ICD-10-CM

## 2024-01-16 DIAGNOSIS — Z90.89 ACQUIRED ABSENCE OF OTHER ORGANS: Chronic | ICD-10-CM

## 2024-01-16 DIAGNOSIS — K11.9 DISEASE OF SALIVARY GLAND, UNSPECIFIED: Chronic | ICD-10-CM

## 2024-01-16 DIAGNOSIS — C50.919 MALIGNANT NEOPLASM OF UNSPECIFIED SITE OF UNSPECIFIED FEMALE BREAST: Chronic | ICD-10-CM

## 2024-01-16 DIAGNOSIS — Z00.8 ENCOUNTER FOR OTHER GENERAL EXAMINATION: ICD-10-CM

## 2024-01-16 PROCEDURE — 20611 DRAIN/INJ JOINT/BURSA W/US: CPT

## 2024-01-16 PROCEDURE — 20611 DRAIN/INJ JOINT/BURSA W/US: CPT | Mod: RT

## 2024-01-20 ENCOUNTER — APPOINTMENT (OUTPATIENT)
Dept: FAMILY MEDICINE | Facility: CLINIC | Age: 74
End: 2024-01-20
Payer: MEDICARE

## 2024-01-20 DIAGNOSIS — J01.80 OTHER ACUTE SINUSITIS: ICD-10-CM

## 2024-01-20 PROCEDURE — 99213 OFFICE O/P EST LOW 20 MIN: CPT | Mod: 95

## 2024-01-20 RX ORDER — AZITHROMYCIN 250 MG/1
250 TABLET, FILM COATED ORAL
Qty: 1 | Refills: 0 | Status: ACTIVE | COMMUNITY
Start: 2023-04-17 | End: 1900-01-01

## 2024-01-20 NOTE — PLAN
[FreeTextEntry1] : Sinusitis - I suspect viral infection and recommended swab. However due to pts concerns and history, will cover with abx.  She states if she is feeling ok will go to urgent care to get tested this weekend  - cont flonase and decongestant - Cont supportive measures including increased fluid intake - f/u if no relief

## 2024-01-20 NOTE — HISTORY OF PRESENT ILLNESS
[Home] : at home, [unfilled] , at the time of the visit. [Medical Office: ([facility])___] : at the medical office located in  [Verbal consent obtained from patient] : the patient, [unfilled] [FreeTextEntry8] : 73 year old female presents with fever, chills, congestion, nausea, headache since yesterday. States she slept most of the day yesterday.  Has pmhx of COPD and cancer . She is concerned as she gets sinus infections and has sig pmhx.

## 2024-02-07 ENCOUNTER — RX RENEWAL (OUTPATIENT)
Age: 74
End: 2024-02-07

## 2024-02-07 ENCOUNTER — APPOINTMENT (OUTPATIENT)
Dept: OTOLARYNGOLOGY | Facility: CLINIC | Age: 74
End: 2024-02-07
Payer: MEDICARE

## 2024-02-07 VITALS
WEIGHT: 158 LBS | HEART RATE: 74 BPM | BODY MASS INDEX: 29.08 KG/M2 | OXYGEN SATURATION: 96 % | SYSTOLIC BLOOD PRESSURE: 193 MMHG | HEIGHT: 62 IN | DIASTOLIC BLOOD PRESSURE: 117 MMHG

## 2024-02-07 PROCEDURE — 99215 OFFICE O/P EST HI 40 MIN: CPT

## 2024-02-07 NOTE — HISTORY OF PRESENT ILLNESS
[de-identified] : Ms. Roberto presents for follow up. 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 Recon facial reanimation and nerve grafting with . Pt had consultation with  regarding RT, pt refused tx.  On 9/6/2023 FNA left parotid  and left cervical level 2  pos for rec ACC 8/8/2023 MRI neck and Ct chest 10/2023 PET scan

## 2024-02-07 NOTE — CONSULT LETTER
[Dear  ___] : Dear  [unfilled], [Courtesy Letter:] : I had the pleasure of seeing your patient, [unfilled], in my office today. [Please see my note below.] : Please see my note below. [Sincerely,] : Sincerely, [FreeTextEntry2] : Clyde Contreras MD ([place], [place]) [FreeTextEntry3] : iTti Richardson MD, FACS\par  \par     [facility] [place]\par  Associate Chair\par     Department of Otolaryngology\par  \par  Professor\par  Otolaryngology & Molecular Medicine\par  [facility] [employer]\par

## 2024-02-07 NOTE — PHYSICAL EXAM
[FreeTextEntry1] : there has been progression of disease in L upper neck sub Q region [Midline] : trachea located in midline position [Normal] : no rashes

## 2024-02-15 ENCOUNTER — OUTPATIENT (OUTPATIENT)
Dept: OUTPATIENT SERVICES | Facility: HOSPITAL | Age: 74
LOS: 1 days | End: 2024-02-15
Payer: MEDICARE

## 2024-02-15 ENCOUNTER — APPOINTMENT (OUTPATIENT)
Dept: MRI IMAGING | Facility: CLINIC | Age: 74
End: 2024-02-15
Payer: MEDICARE

## 2024-02-15 DIAGNOSIS — C07 MALIGNANT NEOPLASM OF PAROTID GLAND: ICD-10-CM

## 2024-02-15 DIAGNOSIS — C80.1 MALIGNANT (PRIMARY) NEOPLASM, UNSPECIFIED: Chronic | ICD-10-CM

## 2024-02-15 DIAGNOSIS — C50.919 MALIGNANT NEOPLASM OF UNSPECIFIED SITE OF UNSPECIFIED FEMALE BREAST: Chronic | ICD-10-CM

## 2024-02-15 DIAGNOSIS — Z90.89 ACQUIRED ABSENCE OF OTHER ORGANS: Chronic | ICD-10-CM

## 2024-02-15 DIAGNOSIS — K11.9 DISEASE OF SALIVARY GLAND, UNSPECIFIED: Chronic | ICD-10-CM

## 2024-02-15 DIAGNOSIS — Z98.890 OTHER SPECIFIED POSTPROCEDURAL STATES: Chronic | ICD-10-CM

## 2024-02-15 DIAGNOSIS — M12.9 ARTHROPATHY, UNSPECIFIED: Chronic | ICD-10-CM

## 2024-02-15 PROCEDURE — 70542 MRI ORBIT/FACE/NECK W/DYE: CPT | Mod: 26

## 2024-02-15 PROCEDURE — A9585: CPT

## 2024-02-15 PROCEDURE — 70542 MRI ORBIT/FACE/NECK W/DYE: CPT

## 2024-03-04 ENCOUNTER — APPOINTMENT (OUTPATIENT)
Dept: ORTHOPEDIC SURGERY | Facility: HOSPITAL | Age: 74
End: 2024-03-04

## 2024-03-18 RX ORDER — MELOXICAM 7.5 MG/1
7.5 TABLET ORAL
Qty: 60 | Refills: 0 | Status: ACTIVE | COMMUNITY
Start: 2024-01-10 | End: 1900-01-01

## 2024-03-26 ENCOUNTER — APPOINTMENT (OUTPATIENT)
Dept: ORTHOPEDIC SURGERY | Facility: CLINIC | Age: 74
End: 2024-03-26

## 2024-04-04 ENCOUNTER — EMERGENCY (EMERGENCY)
Facility: HOSPITAL | Age: 74
LOS: 0 days | Discharge: HOME | End: 2024-04-04
Attending: STUDENT IN AN ORGANIZED HEALTH CARE EDUCATION/TRAINING PROGRAM
Payer: MEDICARE

## 2024-04-04 VITALS
DIASTOLIC BLOOD PRESSURE: 97 MMHG | HEART RATE: 78 BPM | RESPIRATION RATE: 18 BRPM | SYSTOLIC BLOOD PRESSURE: 148 MMHG | TEMPERATURE: 98 F | OXYGEN SATURATION: 97 % | WEIGHT: 149.03 LBS

## 2024-04-04 VITALS
OXYGEN SATURATION: 94 % | SYSTOLIC BLOOD PRESSURE: 179 MMHG | RESPIRATION RATE: 17 BRPM | HEART RATE: 78 BPM | DIASTOLIC BLOOD PRESSURE: 108 MMHG | TEMPERATURE: 98 F

## 2024-04-04 DIAGNOSIS — K11.9 DISEASE OF SALIVARY GLAND, UNSPECIFIED: Chronic | ICD-10-CM

## 2024-04-04 DIAGNOSIS — M54.50 LOW BACK PAIN, UNSPECIFIED: ICD-10-CM

## 2024-04-04 DIAGNOSIS — J44.9 CHRONIC OBSTRUCTIVE PULMONARY DISEASE, UNSPECIFIED: ICD-10-CM

## 2024-04-04 DIAGNOSIS — C80.1 MALIGNANT (PRIMARY) NEOPLASM, UNSPECIFIED: Chronic | ICD-10-CM

## 2024-04-04 DIAGNOSIS — Z90.89 ACQUIRED ABSENCE OF OTHER ORGANS: Chronic | ICD-10-CM

## 2024-04-04 DIAGNOSIS — C50.919 MALIGNANT NEOPLASM OF UNSPECIFIED SITE OF UNSPECIFIED FEMALE BREAST: Chronic | ICD-10-CM

## 2024-04-04 DIAGNOSIS — M54.42 LUMBAGO WITH SCIATICA, LEFT SIDE: ICD-10-CM

## 2024-04-04 DIAGNOSIS — M12.9 ARTHROPATHY, UNSPECIFIED: Chronic | ICD-10-CM

## 2024-04-04 DIAGNOSIS — Z88.0 ALLERGY STATUS TO PENICILLIN: ICD-10-CM

## 2024-04-04 DIAGNOSIS — I10 ESSENTIAL (PRIMARY) HYPERTENSION: ICD-10-CM

## 2024-04-04 DIAGNOSIS — M16.11 UNILATERAL PRIMARY OSTEOARTHRITIS, RIGHT HIP: ICD-10-CM

## 2024-04-04 DIAGNOSIS — E03.9 HYPOTHYROIDISM, UNSPECIFIED: ICD-10-CM

## 2024-04-04 DIAGNOSIS — Z98.890 OTHER SPECIFIED POSTPROCEDURAL STATES: Chronic | ICD-10-CM

## 2024-04-04 DIAGNOSIS — C50.919 MALIGNANT NEOPLASM OF UNSPECIFIED SITE OF UNSPECIFIED FEMALE BREAST: ICD-10-CM

## 2024-04-04 PROCEDURE — 99284 EMERGENCY DEPT VISIT MOD MDM: CPT

## 2024-04-04 PROCEDURE — 99283 EMERGENCY DEPT VISIT LOW MDM: CPT | Mod: 25

## 2024-04-04 PROCEDURE — 96372 THER/PROPH/DIAG INJ SC/IM: CPT

## 2024-04-04 RX ORDER — DEXAMETHASONE 0.5 MG/5ML
10 ELIXIR ORAL ONCE
Refills: 0 | Status: COMPLETED | OUTPATIENT
Start: 2024-04-04 | End: 2024-04-04

## 2024-04-04 RX ORDER — LIDOCAINE 4 G/100G
1 CREAM TOPICAL ONCE
Refills: 0 | Status: COMPLETED | OUTPATIENT
Start: 2024-04-04 | End: 2024-04-04

## 2024-04-04 RX ORDER — IBUPROFEN 200 MG
400 TABLET ORAL ONCE
Refills: 0 | Status: COMPLETED | OUTPATIENT
Start: 2024-04-04 | End: 2024-04-04

## 2024-04-04 RX ORDER — MORPHINE SULFATE 50 MG/1
15 CAPSULE, EXTENDED RELEASE ORAL ONCE
Refills: 0 | Status: DISCONTINUED | OUTPATIENT
Start: 2024-04-04 | End: 2024-04-04

## 2024-04-04 RX ORDER — ACETAMINOPHEN 500 MG
650 TABLET ORAL ONCE
Refills: 0 | Status: COMPLETED | OUTPATIENT
Start: 2024-04-04 | End: 2024-04-04

## 2024-04-04 RX ORDER — OXYCODONE HYDROCHLORIDE 5 MG/1
1 TABLET ORAL
Qty: 15 | Refills: 0
Start: 2024-04-04

## 2024-04-04 RX ADMIN — Medication 10 MILLIGRAM(S): at 15:19

## 2024-04-04 RX ADMIN — Medication 650 MILLIGRAM(S): at 13:46

## 2024-04-04 RX ADMIN — LIDOCAINE 1 PATCH: 4 CREAM TOPICAL at 13:45

## 2024-04-04 RX ADMIN — MORPHINE SULFATE 15 MILLIGRAM(S): 50 CAPSULE, EXTENDED RELEASE ORAL at 13:46

## 2024-04-04 RX ADMIN — Medication 400 MILLIGRAM(S): at 13:46

## 2024-04-04 NOTE — ED STATDOCS - NSFOLLOWUPINSTRUCTIONS_ED_ALL_ED_FT
FOLLOW UP WITH YOUR ORTHOPEDIC DOCTOR AT YOUR APPOINTMENT ON TUESDAY. RETURN TO ER FOR ANY WORSENING SYMPTOMS OR NEW CONCERNS.     Sciatica     Sciatica is pain, numbness, weakness, or tingling along the path of the sciatic nerve. The sciatic nerve starts in the lower back and runs down the back of each leg. The nerve controls the muscles in the lower leg and in the back of the knee. It also provides feeling (sensation) to the back of the thigh, the lower leg, and the sole of the foot. Sciatica is a symptom of another medical condition that pinches or puts pressure on the sciatic nerve.  Generally, sciatica only affects one side of the body. Sciatica usually goes away on its own or with treatment. In some cases, sciatica may keep coming back (recur).  What are the causes?  This condition is caused by pressure on the sciatic nerve, or pinching of the sciatic nerve. This may be the result of:  A disk in between the bones of the spine (vertebrae) bulging out too far (herniated disk).Age-related changes in the spinal disks (degenerative disk disease).A pain disorder that affects a muscle in the buttock (piriformis syndrome).Extra bone growth (bone spur) near the sciatic nerve.An injury or break (fracture) of the pelvis.Pregnancy.Tumor (rare).What increases the risk?  The following factors may make you more likely to develop this condition:  Playing sports that place pressure or stress on the spine, such as football or weight lifting.Having poor strength and flexibility.A history of back injury.A history of back surgery.Sitting for long periods of time.Doing activities that involve repetitive bending or lifting.Obesity.What are the signs or symptoms?  Symptoms can vary from mild to very severe, and they may include:  Any of these problems in the lower back, leg, hip, or buttock:  Mild tingling or dull aches.Burning sensations.Sharp pains.Numbness in the back of the calf or the sole of the foot.Leg weakness.Severe back pain that makes movement difficult.These symptoms may get worse when you cough, sneeze, or laugh, or when you sit or stand for long periods of time. Being overweight may also make symptoms worse. In some cases, symptoms may recur over time.  How is this diagnosed?  This condition may be diagnosed based on:  Your symptoms.A physical exam. Your health care provider may ask you to do certain movements to check whether those movements trigger your symptoms.You may have tests, including:  Blood tests.X-rays.MRI.CT scan.How is this treated?  In many cases, this condition improves on its own, without any treatment. However, treatment may include:  Reducing or modifying physical activity during periods of pain.Exercising and stretching to strengthen your abdomen and improve the flexibility of your spine.Icing and applying heat to the affected area.Medicines that help:  To relieve pain and swelling.To relax your muscles.Injections of medicines that help to relieve pain, irritation, and inflammation around the sciatic nerve (steroids).Surgery.Follow these instructions at home:  Medicines     Take over-the-counter and prescription medicines only as told by your health care provider.Do not drive or operate heavy machinery while taking prescription pain medicine.Managing pain     If directed, apply ice to the affected area.  Put ice in a plastic bag.Place a towel between your skin and the bag.Leave the ice on for 20 minutes, 2–3 times a day.After icing, apply heat to the affected area before you exercise or as often as told by your health care provider. Use the heat source that your health care provider recommends, such as a moist heat pack or a heating pad.  Place a towel between your skin and the heat source.Leave the heat on for 20–30 minutes.Remove the heat if your skin turns bright red. This is especially important if you are unable to feel pain, heat, or cold. You may have a greater risk of getting burned.Activity     Return to your normal activities as told by your health care provider. Ask your health care provider what activities are safe for you.  Avoid activities that make your symptoms worse.Take brief periods of rest throughout the day. Resting in a lying or standing position is usually better than sitting to rest.  When you rest for longer periods, mix in some mild activity or stretching between periods of rest. This will help to prevent stiffness and pain.Avoid sitting for long periods of time without moving. Get up and move around at least one time each hour.Exercise and stretch regularly, as told by your health care provider.Do not lift anything that is heavier than 10 lb (4.5 kg) while you have symptoms of sciatica. When you do not have symptoms, you should still avoid heavy lifting, especially repetitive heavy lifting.When you lift objects, always use proper lifting technique, which includes:  Bending your knees.Keeping the load close to your body.Avoiding twisting.General instructions     Use good posture.  Avoid leaning forward while sitting.Avoid hunching over while standing.Maintain a healthy weight. Excess weight puts extra stress on your back and makes it difficult to maintain good posture.Wear supportive, comfortable shoes. Avoid wearing high heels.Avoid sleeping on a mattress that is too soft or too hard. A mattress that is firm enough to support your back when you sleep may help to reduce your pain.Keep all follow-up visits as told by your health care provider. This is important.Contact a health care provider if:  You have pain that wakes you up when you are sleeping.You have pain that gets worse when you lie down.Your pain is worse than you have experienced in the past.Your pain lasts longer than 4 weeks.You experience unexplained weight loss.Get help right away if:  You lose control of your bowel or bladder (incontinence).You have:  Weakness in your lower back, pelvis, buttocks, or legs that gets worse.Redness or swelling of your back.A burning sensation when you urinate.This information is not intended to replace advice given to you by your health care provider. Make sure you discuss any questions you have with your health care provider.

## 2024-04-04 NOTE — ED STATDOCS - NSICDXPASTSURGICALHX_GEN_ALL_CORE_FT
PAST SURGICAL HISTORY:  Arthropathy b/l knees (one in 2005 and other side in 2010)    Breast cancer 2002 right lumpectomy with post op chemotherapy and RT s/p lymph node dissection    Cancer [id number] inserted for chemotherapy in 2002 and then removed when chemotherapy completed    History of surgery left brow lift 7/2019    Mass of left parotid gland Excision of parotid mass with left neck dissection & graft 3/2019    Parotid mass diagnosed in 2007 with excision of left parotid ("adenoid cystic carcinoma") with post op RT    S/P tonsillectomy

## 2024-04-04 NOTE — ED ADULT TRIAGE NOTE - PATIENT'S PREFERRED PRONOUN
Received letter and summons and placed in the dumaiter as patient's  is at the  picking this up.  Jovanna Hector [facility]  2nd Floor  Primary Care     Her/She

## 2024-04-04 NOTE — ED STATDOCS - PATIENT PORTAL LINK FT
You can access the FollowMyHealth Patient Portal offered by [facility] by registering at the following website: [email or web address]. By joining [facility]’s FollowMyHealth portal, you will also be able to view your health information using other applications (apps) compatible with our system.

## 2024-04-04 NOTE — ED STATDOCS - PHYSICAL EXAMINATION
Vital signs reviewed  GENERAL: Patient nontoxic appearing, NAD  HEAD: NCAT  EYES: Anicteric  ENT: MMM  NECK: Supple, non tender  RESPIRATORY: Normal respiratory effort. CTA B/L. No wheezing, rales, rhonchi  CARDIOVASCULAR: Regular rate and rhythm  ABDOMEN: Soft. Nondistended. Nontender. No guarding or rebound. No CVA tenderness.  MUSCULOSKELETAL/EXTREMITIES: Brisk cap refill. 2+ radial pulses. No leg edema.  SKIN:  Warm and dry  NEURO: AAOx3. No gross FND.  PSYCHIATRIC: Cooperative. Affect appropriate. Vital signs reviewed  GENERAL: Patient nontoxic appearing, NAD  HEAD: NCAT  EYES: Anicteric  ENT: MMM  NECK: Supple, non tender  RESPIRATORY: Normal respiratory effort. CTA B/L. No wheezing, rales, rhonchi  CARDIOVASCULAR: Regular rate and rhythm  ABDOMEN: Soft. Nondistended. Nontender. No guarding or rebound. No CVA tenderness.  MUSCULOSKELETAL/EXTREMITIES: Brisk cap refill. 2+ radial pulses. No leg edema.  no midline ttp, +L buttock ttp. MS 5/5 LE. sensation ntact throughout   SKIN:  Warm and dry  NEURO: AAOx3. No gross FND.  PSYCHIATRIC: Cooperative. Affect appropriate.

## 2024-04-04 NOTE — ED STATDOCS - CLINICAL SUMMARY MEDICAL DECISION MAKING FREE TEXT BOX
signed Ana Mccarty PA-C Pt initially seen and examined by Dr. Burden in intake.   73F c/o left sided buttock pain similar to sciatica for 2-3 weeks. Pt has appt with her ortho Dr Parr on 4/9 but says the pain was too severe and she couldn't wait. pt requesting steroid injection, discussed that we do not perform  joint or facet injection in ER but can give IM steroid and pt is satisfied with this. Pt declines offer for spine appt, prefers to stay with her ortho doc. daughter driving her home. rx oxydocone and medrol. return precautions given. Pt ambulates with antalgic gait in ED. Pt feeling well at DC, agrees with DC and plan of care. signed Ana Mccarty PA-C Pt initially seen and examined by Dr. Burden in intake.   73F c/o left sided buttock pain similar to sciatica for 2-3 weeks. Pt has appt with her ortho Dr Parr on 4/9 but says the pain was too severe and she couldn't wait. pt requesting steroid injection, discussed that we do not perform  joint or facet injection in ER but can give IM steroid and pt is satisfied with this. Pt declines offer for spine appt, prefers to stay with her ortho doc. daughter driving her home. rx oxydocone and medrol. return precautions given. Pt ambulates with antalgic gait in ED. Pt feeling well at DC, agrees with DC and plan of care.    This patient presents with back pain most consistent with radiculopathy  No back pain red flags on history or physical. low suspicion for  fracture (no trauma, no bony tenderness to palpation), cauda equina (no bowel or urinary incontinence/retention, no saddle anesthesia, no distal weakness), AAA, viscus perforation , pulmonary embolism, renal colic, pyelonephritis (afebrile, no CVAT, no urinary symptoms). Given the clinical picture, no indication for imaging at this time Plan: pain control, supportive care, reassess   given patients hx recommended CT of L spine to r/o any spinal lesion, has hx of Ca. pt refusing at home time. I explaine dthat we can miss metastatic dz however pt states she will get PET scan by her pcp.

## 2024-04-04 NOTE — ED ADULT TRIAGE NOTE - CHIEF COMPLAINT QUOTE
Patient presents to the ER with complaints of sciatica pain to left buttock. Patient reports history of adenoid cystic carcinoma on anlyta, arthritis of left hip, breast ca.

## 2024-04-04 NOTE — ED STATDOCS - OBJECTIVE STATEMENT
74 yo female w/PMHx COPD, arthritis of R hip, HTN, hypothyroid, breast CA on immunotherapy presents to the ED c/o L buttock pain. Pt has been having sciatic pain that radiates to L buttock for the last 2-3 weeks. Pt has not had any injections done thus far. Pt not on any blood thinners. 72 yo female w/PMHx COPD, arthritis of R hip, HTN, hypothyroid, breast CA on immunotherapy presents to the ED c/o L buttock pain. Pt has been having sciatic pain that radiates to L buttock for the last 2-3 weeks. Pt has not had any injections done thus far, has ortho f/u. Pt not on any blood thinners.  no falls, fever, numbness, weakness, saddle anesthesia, bowel bladder incontinence

## 2024-04-04 NOTE — ED ADULT NURSE REASSESSMENT NOTE - NS ED NURSE REASSESS COMMENT FT1
pt seen and examined by MD. pt deemed safe for discharge. safety and comfort measures maintained throughout stay.

## 2024-04-08 ENCOUNTER — RX RENEWAL (OUTPATIENT)
Age: 74
End: 2024-04-08

## 2024-04-08 RX ORDER — LEVOTHYROXINE SODIUM 0.05 MG/1
50 TABLET ORAL
Qty: 90 | Refills: 3 | Status: ACTIVE | COMMUNITY
Start: 2019-03-23 | End: 1900-01-01

## 2024-04-08 RX ORDER — OLMESARTAN MEDOXOMIL 20 MG/1
20 TABLET, FILM COATED ORAL DAILY
Qty: 90 | Refills: 0 | Status: ACTIVE | COMMUNITY
Start: 2019-03-05 | End: 1900-01-01

## 2024-04-09 ENCOUNTER — APPOINTMENT (OUTPATIENT)
Dept: PHYSICAL MEDICINE AND REHAB | Facility: CLINIC | Age: 74
End: 2024-04-09
Payer: MEDICARE

## 2024-04-09 VITALS
HEART RATE: 102 BPM | OXYGEN SATURATION: 96 % | BODY MASS INDEX: 29.08 KG/M2 | SYSTOLIC BLOOD PRESSURE: 142 MMHG | WEIGHT: 158 LBS | HEIGHT: 62 IN | DIASTOLIC BLOOD PRESSURE: 97 MMHG

## 2024-04-09 DIAGNOSIS — M16.12 UNILATERAL PRIMARY OSTEOARTHRITIS, LEFT HIP: ICD-10-CM

## 2024-04-09 DIAGNOSIS — M79.10 MYALGIA, UNSPECIFIED SITE: ICD-10-CM

## 2024-04-09 DIAGNOSIS — M16.11 UNILATERAL PRIMARY OSTEOARTHRITIS, RIGHT HIP: ICD-10-CM

## 2024-04-09 DIAGNOSIS — M48.061 SPINAL STENOSIS, LUMBAR REGION WITHOUT NEUROGENIC CLAUDICATION: ICD-10-CM

## 2024-04-09 PROCEDURE — 99214 OFFICE O/P EST MOD 30 MIN: CPT | Mod: 25

## 2024-04-09 PROCEDURE — 20553 NJX 1/MLT TRIGGER POINTS 3/>: CPT

## 2024-04-09 NOTE — HISTORY OF PRESENT ILLNESS
[FreeTextEntry1] : Ms. JOSH PACHECO is a 73 year old female who presents for follow up. At last visit, she was continued on Tylenol PRN, given Tramadol PRN, referred to [facility] and discussed an LARS. She saw Ortho who recommended a CLAY for which she is holding off for now. Since last visit, she has restarted chemotherapy for her head/neck cancer. She is going to [facility] for this treatment. Pain is now located more over L buttock.    Location:Mainly L buttock Onset: Chronic in terms of low back pain,  no inciting events.  Provocation/Palliative: Back pain is worse from sitting and standing position Quality: Sharp pain, shooting Radiation: Nothing down leg Severity: severe Timing: Worse in the morning.  No bowel/bladder changes. No groin numbness.

## 2024-04-09 NOTE — PROCEDURE
[de-identified] : Reason for procedure: Myalgia  Procedure: Trigger Point Injections Physician: Dr. Parr Medication injected: Lidocaine 2% 2cc and 1cc of Kenalog 40mg/ml Sedation medications: None Estimated blood loss: None Complications: None  Technique: R/B/A to trigger point injection reviewed with patient. The patient is agreeable and wishes to proceed.   The patient was placed in prone position and trigger points of her left lumbar paraspinals, gluteus medius and gluteus lucas were identified. The area was prepped in normal sterile fashion with Chloroprep. A 27 gauge 1.25 inch needle was advanced into the palpable trigger points with reproduction of pain. After negative aspiration of heme, the above medications were injected into the trigger areas. Needle was then removed, bandaid placed over injection sites. There was no complications, the patient was provided with post injection instructions.

## 2024-04-09 NOTE — ASSESSMENT
[FreeTextEntry1] : Ms. JOSH PACHECO is a 73 year old female who presents with exacerbation of low back pain, likely a combination of her underlying stenosis, myofascial pain and possibly from her L hip OA. Denies any red flag signs. Will recommend: - X-ray Pelvis reviewed - TPI performed today, tolerated well. Patient obtained clearance from her Oncologist to have an IM steroid injection.  - She will f/u with [facility]o [facility] for possible R CLAY - Could consider from SI joint injection in future if pain continues.  - Patient not interested in any other pain medications at this time.  RTC in in 3-4 weeks. Patient aware of red flag signs including any changes to their bowel/bladder control, groin numbness or new weakness. Patient knows to seek immediate attention by calling 911 or going to nearest ER if these symptoms appear.

## 2024-04-09 NOTE — PHYSICAL EXAM
[FreeTextEntry1] : PE:  Constitutional: In NAD, calm and cooperative MSK  Inspection: no gross swelling identified  Palpation: Moderate TTP over L lower lumbar paraspinals, gluteus medius/gluteus maxmimus.  No calf tednerness. LE pulses equal and intact.  ROM: Pain at end lumbar extension > flexion but AROM intact.  Strength: 5/5 strength in bilateral lower extremities except for R hip flexion which is 4/5 due to pain  Reflexes: 2+ Patella reflex bilaterally, 2+ Achilles reflex bilaterally, negative clonus bilaterally  Sensation: Intact to light touch in bilateral lower extremities  Special tests: SLR: negative bilaterally Facet loading: Positive bilaterally. FADIR: positive bilaterally

## 2024-04-16 ENCOUNTER — APPOINTMENT (OUTPATIENT)
Dept: ORTHOPEDIC SURGERY | Facility: CLINIC | Age: 74
End: 2024-04-16

## 2024-04-22 ENCOUNTER — APPOINTMENT (OUTPATIENT)
Dept: ORTHOPEDIC SURGERY | Facility: CLINIC | Age: 74
End: 2024-04-22
Payer: MEDICARE

## 2024-04-22 VITALS — BODY MASS INDEX: 29.08 KG/M2 | HEIGHT: 62 IN | WEIGHT: 158 LBS

## 2024-04-22 DIAGNOSIS — M16.0 BILATERAL PRIMARY OSTEOARTHRITIS OF HIP: ICD-10-CM

## 2024-04-22 DIAGNOSIS — M43.16 SPONDYLOLISTHESIS, LUMBAR REGION: ICD-10-CM

## 2024-04-22 DIAGNOSIS — M70.61 TROCHANTERIC BURSITIS, RIGHT HIP: ICD-10-CM

## 2024-04-22 DIAGNOSIS — M47.817 SPONDYLOSIS W/OUT MYELOPATHY OR RADICULOPATHY, LUMBOSACRAL REGION: ICD-10-CM

## 2024-04-22 PROCEDURE — 73502 X-RAY EXAM HIP UNI 2-3 VIEWS: CPT

## 2024-04-22 PROCEDURE — 20610 DRAIN/INJ JOINT/BURSA W/O US: CPT | Mod: RT

## 2024-04-22 PROCEDURE — 72100 X-RAY EXAM L-S SPINE 2/3 VWS: CPT

## 2024-04-22 PROCEDURE — 99204 OFFICE O/P NEW MOD 45 MIN: CPT | Mod: 25

## 2024-04-22 RX ORDER — METHYLPREDNISOLONE ACETATE 40 MG/ML
40 INJECTION, SUSPENSION INTRA-ARTICULAR; INTRALESIONAL; INTRAMUSCULAR; SOFT TISSUE
Refills: 0 | Status: COMPLETED | OUTPATIENT
Start: 2024-04-22

## 2024-04-22 NOTE — REASON FOR VISIT
[FreeTextEntry2] : Patient is a 73 year old female with complaints of L Hip pain x several weeks. Pain sitting and standing. Pain laying down which wakes her. Patient has hx of head and Neck Cancer. Saw another orthopedist, told her it was her hip, gave her 3 injections that did not help. Likes to golf.

## 2024-04-22 NOTE — PHYSICAL EXAM
[Facet arthropathy] : Facet arthropathy [Disc space narrowing] : Disc space narrowing [Scoliosis] : Scoliosis [Spondylolithesis] : Spondylolithesis [] : mildly antalgic [Left] : left hip with pelvis [AP] : anteroposterior [Lateral] : lateral [There are no fractures, subluxations or dislocations. No significant abnormalities are seen] : There are no fractures, subluxations or dislocations. No significant abnormalities are seen [Moderate arthritis (Tonnis Grade 2)] : Moderate arthritis (Tonnis Grade 2)

## 2024-04-22 NOTE — HISTORY OF PRESENT ILLNESS
[Lower back] : lower back [Buttock] : buttock [10] : 10 [Sharp] : sharp [Constant] : constant [Nothing helps with pain getting better] : Nothing helps with pain getting better [Standing] : standing [Walking] : walking [Lying in bed] : lying in bed [Retired] : Work status: retired [] : Post Surgical Visit: no [FreeTextEntry1] : L Hip  [FreeTextEntry7] : L Thigh

## 2024-04-22 NOTE — ASSESSMENT
[FreeTextEntry1] : does not want to go to PT. Refused surgery. Would recommend ice and ointments. Injection in the bursa offered, she accepted. Possible risks discussed including infection.

## 2024-05-08 NOTE — H&P PST ADULT - NS PRO ABUSE SCREEN SUSPICION NEGLECT YN

## 2024-05-14 ENCOUNTER — APPOINTMENT (OUTPATIENT)
Dept: FAMILY MEDICINE | Facility: CLINIC | Age: 74
End: 2024-05-14
Payer: MEDICARE

## 2024-05-14 VITALS
BODY MASS INDEX: 26.87 KG/M2 | OXYGEN SATURATION: 96 % | SYSTOLIC BLOOD PRESSURE: 126 MMHG | HEIGHT: 62 IN | WEIGHT: 146 LBS | HEART RATE: 74 BPM | TEMPERATURE: 97.6 F | DIASTOLIC BLOOD PRESSURE: 90 MMHG

## 2024-05-14 VITALS — SYSTOLIC BLOOD PRESSURE: 112 MMHG | DIASTOLIC BLOOD PRESSURE: 76 MMHG

## 2024-05-14 DIAGNOSIS — C07 MALIGNANT NEOPLASM OF PAROTID GLAND: ICD-10-CM

## 2024-05-14 DIAGNOSIS — E03.9 HYPOTHYROIDISM, UNSPECIFIED: ICD-10-CM

## 2024-05-14 DIAGNOSIS — R09.82 POSTNASAL DRIP: ICD-10-CM

## 2024-05-14 PROCEDURE — 99214 OFFICE O/P EST MOD 30 MIN: CPT

## 2024-05-14 RX ORDER — AZITHROMYCIN 250 MG/1
250 TABLET, FILM COATED ORAL
Qty: 1 | Refills: 0 | Status: ACTIVE | COMMUNITY
Start: 2024-05-14 | End: 1900-01-01

## 2024-05-14 RX ORDER — ALPRAZOLAM 0.25 MG/1
0.25 TABLET ORAL
Qty: 30 | Refills: 0 | Status: ACTIVE | COMMUNITY
Start: 2019-03-08 | End: 1900-01-01

## 2024-05-14 RX ORDER — IPRATROPIUM BROMIDE 21 UG/1
0.03 SPRAY NASAL
Qty: 1 | Refills: 2 | Status: ACTIVE | COMMUNITY
Start: 2022-04-04 | End: 1900-01-01

## 2024-05-16 NOTE — ASSESSMENT
[FreeTextEntry1] : anxiety - refill xanax prn. PND - ipratropium nasal copd - slight exacerbation. cont trelegy. zpak if symptoms worsen

## 2024-05-16 NOTE — HISTORY OF PRESENT ILLNESS
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 Pt is s/p 2 surgeries for it in 2007 and 2019. Pt was on inlyta from 03/2024-04/2024, no longer covered by insurance and pt didn't tolerate well. Pt now opting for no further surgeries or treatment. Pt sees onc Dr. Contreras.  Pt has hx of COPD. COPD stable. Pt on trelegy Pt f/u anxiety, due to refill Xanax prn   Pt has htn. Pt on olmesartan.

## 2024-05-16 NOTE — PHYSICAL EXAM
[Soft] : abdomen soft [Non Tender] : non-tender [Normal] : affect was normal and insight and judgment were intact [de-identified] : mild wheeze

## 2024-05-21 ENCOUNTER — APPOINTMENT (OUTPATIENT)
Dept: ORTHOPEDIC SURGERY | Facility: CLINIC | Age: 74
End: 2024-05-21

## 2024-06-03 ENCOUNTER — APPOINTMENT (OUTPATIENT)
Dept: FAMILY MEDICINE | Facility: CLINIC | Age: 74
End: 2024-06-03
Payer: MEDICARE

## 2024-06-03 VITALS
SYSTOLIC BLOOD PRESSURE: 124 MMHG | HEIGHT: 62.5 IN | WEIGHT: 145 LBS | HEART RATE: 68 BPM | TEMPERATURE: 97.3 F | BODY MASS INDEX: 26.02 KG/M2 | OXYGEN SATURATION: 98 % | RESPIRATION RATE: 14 BRPM | DIASTOLIC BLOOD PRESSURE: 88 MMHG

## 2024-06-03 DIAGNOSIS — J44.9 CHRONIC OBSTRUCTIVE PULMONARY DISEASE, UNSPECIFIED: ICD-10-CM

## 2024-06-03 DIAGNOSIS — J32.9 CHRONIC SINUSITIS, UNSPECIFIED: ICD-10-CM

## 2024-06-03 PROCEDURE — 99214 OFFICE O/P EST MOD 30 MIN: CPT

## 2024-06-03 RX ORDER — PREDNISONE 10 MG/1
10 TABLET ORAL
Qty: 15 | Refills: 0 | Status: ACTIVE | COMMUNITY
Start: 2024-06-03 | End: 1900-01-01

## 2024-06-03 RX ORDER — AZITHROMYCIN 250 MG/1
250 TABLET, FILM COATED ORAL
Qty: 1 | Refills: 0 | Status: ACTIVE | COMMUNITY
Start: 2024-06-03 | End: 1900-01-01

## 2024-06-03 NOTE — HISTORY OF PRESENT ILLNESS
[FreeTextEntry8] : Pt f/u copd exacerbation and worsened sinus pain/pressure for past 1-2 weeks. Pt took zpak 3 weeks ago which improved symptoms initially but now has re-exacerbation of symptoms. Denies fever but has had more chills in past week.

## 2024-06-03 NOTE — PHYSICAL EXAM
[TextEntry] : Constitutional:  no acute distress Eyes:  normal sclera/conjunctiva, pupils equal round and reactive to light and extraocular movements intact. ENT:  the outer ears and nose were normal in appearance, TMs were wnl. +cobblestoning posterior oropharynx, +TTP over b/l maxillary sinuses Neck:  supple Pulmonary:  lungs coarse lung sounds b/l, no respiratory distress, no accessory muscle use. Cardiac:  normal rate, with a regular rhythm, normal S1 and S2 and no murmur heard. Psychiatric:  the affect was normal and insight and judgment were intact.

## 2024-07-17 ENCOUNTER — APPOINTMENT (OUTPATIENT)
Dept: OTOLARYNGOLOGY | Facility: CLINIC | Age: 74
End: 2024-07-17
Payer: MEDICARE

## 2024-07-17 VITALS
WEIGHT: 144 LBS | HEART RATE: 70 BPM | OXYGEN SATURATION: 96 % | BODY MASS INDEX: 25.84 KG/M2 | DIASTOLIC BLOOD PRESSURE: 76 MMHG | HEIGHT: 62.5 IN | SYSTOLIC BLOOD PRESSURE: 161 MMHG

## 2024-07-17 DIAGNOSIS — C07 MALIGNANT NEOPLASM OF PAROTID GLAND: ICD-10-CM

## 2024-07-17 PROCEDURE — 99214 OFFICE O/P EST MOD 30 MIN: CPT

## 2024-07-19 ENCOUNTER — NON-APPOINTMENT (OUTPATIENT)
Age: 74
End: 2024-07-19

## 2024-08-09 ENCOUNTER — APPOINTMENT (OUTPATIENT)
Dept: FAMILY MEDICINE | Facility: CLINIC | Age: 74
End: 2024-08-09

## 2024-08-09 PROCEDURE — 99214 OFFICE O/P EST MOD 30 MIN: CPT

## 2024-08-09 NOTE — PHYSICAL EXAM
[No Respiratory Distress] : no respiratory distress  [No Accessory Muscle Use] : no accessory muscle use [Normal] : normal rate, regular rhythm, normal S1 and S2 and no murmur heard [de-identified] : cobblestoning posterior oropharynx, maxillary sinus tenderness [de-identified] : coarse wheeze b/l lung fields

## 2024-08-09 NOTE — HISTORY OF PRESENT ILLNESS
[FreeTextEntry8] : Pt c/o 2 days of fatigue, worsened wheezing, productive cough green sputum, sore throat, chills, fatigue, myalgias. Denies fever. COPD worsened, wheezing more and coughing worse.

## 2024-08-09 NOTE — ASSESSMENT
[FreeTextEntry1] : recurrent sinusitis, COPD exacerbation - zpak, check flu/covid pcr. if no improvement consider prednisone

## 2024-08-14 NOTE — PATIENT PROFILE ADULT - INFORMATION PROVIDED TO:
08/14/24                            Fadumo MCCL[address]  [place] [address]    To Whom It May Concern:    This is to certify Fadumo Navarro was evaluated with Carroll Reveles MD on 8/12/2024 and is excused from work through 8/16/2024. She may return to work as tolerated on 8/17/2024        Electronically signed by:  Carroll Reveles MD  [facility]  [address] [place] [address]  Dept Phone: [phone]       
patient

## 2024-08-19 ENCOUNTER — RX RENEWAL (OUTPATIENT)
Age: 74
End: 2024-08-19

## 2024-08-29 ENCOUNTER — APPOINTMENT (OUTPATIENT)
Dept: FAMILY MEDICINE | Facility: CLINIC | Age: 74
End: 2024-08-29
Payer: MEDICARE

## 2024-08-29 ENCOUNTER — RX RENEWAL (OUTPATIENT)
Age: 74
End: 2024-08-29

## 2024-08-29 ENCOUNTER — NON-APPOINTMENT (OUTPATIENT)
Age: 74
End: 2024-08-29

## 2024-08-29 VITALS
HEIGHT: 62.5 IN | DIASTOLIC BLOOD PRESSURE: 72 MMHG | TEMPERATURE: 97.2 F | SYSTOLIC BLOOD PRESSURE: 120 MMHG | BODY MASS INDEX: 25.48 KG/M2 | WEIGHT: 142 LBS | OXYGEN SATURATION: 94 % | HEART RATE: 81 BPM

## 2024-08-29 DIAGNOSIS — R94.31 ABNORMAL ELECTROCARDIOGRAM [ECG] [EKG]: ICD-10-CM

## 2024-08-29 DIAGNOSIS — J44.9 CHRONIC OBSTRUCTIVE PULMONARY DISEASE, UNSPECIFIED: ICD-10-CM

## 2024-08-29 DIAGNOSIS — M94.0 CHONDROCOSTAL JUNCTION SYNDROME [TIETZE]: ICD-10-CM

## 2024-08-29 PROCEDURE — 99214 OFFICE O/P EST MOD 30 MIN: CPT

## 2024-08-29 PROCEDURE — 93000 ELECTROCARDIOGRAM COMPLETE: CPT

## 2024-08-29 RX ORDER — AZITHROMYCIN 250 MG/1
250 TABLET, FILM COATED ORAL
Qty: 1 | Refills: 0 | Status: ACTIVE | COMMUNITY
Start: 2024-08-29 | End: 1900-01-01

## 2024-08-29 NOTE — PHYSICAL EXAM
[No Respiratory Distress] : no respiratory distress  [No Accessory Muscle Use] : no accessory muscle use [Normal] : affect was normal and insight and judgment were intact [de-identified] : cobblestoning posterior oropharynx, maxillary sinus tenderness [de-identified] : coarse wheeze b/l lung fields [de-identified] : +R sternum TTP, no rash

## 2024-08-29 NOTE — ASSESSMENT
[FreeTextEntry1] : COPD exacerbation - increase trelegy to 200mcg q day x 2 weeks, then return to 100mcg dose. cxr. zpak costochondritis - trial of ibuprofen abnml EKG, hx of smoking, family hx CAD- refer to cardio

## 2024-08-29 NOTE — HISTORY OF PRESENT ILLNESS
[FreeTextEntry8] : Pt c/o 3 weeks of fatigue, worsened wheezing, sporadic productive cough yellow sputum, chills, fatigue, myalgias. Denies fever. COPD worsened, wheezing more and coughing worse.Pt had completed course of zpak which didn't help. Pt c/o chest pain for past 3 days R sternum, worse when coughing. Denies worsened pain with activity.

## 2024-10-29 ENCOUNTER — APPOINTMENT (OUTPATIENT)
Dept: ORTHOPEDIC SURGERY | Facility: CLINIC | Age: 74
End: 2024-10-29
Payer: MEDICARE

## 2024-10-29 VITALS — BODY MASS INDEX: 25.48 KG/M2 | WEIGHT: 142 LBS | HEIGHT: 62.5 IN

## 2024-10-29 DIAGNOSIS — M70.61 TROCHANTERIC BURSITIS, RIGHT HIP: ICD-10-CM

## 2024-10-29 DIAGNOSIS — M16.7 OTHER UNILATERAL SECONDARY OSTEOARTHRITIS OF HIP: ICD-10-CM

## 2024-10-29 DIAGNOSIS — M16.0 BILATERAL PRIMARY OSTEOARTHRITIS OF HIP: ICD-10-CM

## 2024-10-29 PROCEDURE — 99214 OFFICE O/P EST MOD 30 MIN: CPT | Mod: 25

## 2024-10-29 PROCEDURE — 20610 DRAIN/INJ JOINT/BURSA W/O US: CPT | Mod: RT

## 2024-10-29 RX ORDER — METHYLPREDNISOLONE ACETATE 40 MG/ML
40 INJECTION, SUSPENSION INTRA-ARTICULAR; INTRALESIONAL; INTRAMUSCULAR; SOFT TISSUE
Refills: 0 | Status: COMPLETED | OUTPATIENT
Start: 2024-10-29

## 2024-11-22 ENCOUNTER — TRANSCRIPTION ENCOUNTER (OUTPATIENT)
Age: 74
End: 2024-11-22

## 2024-12-02 ENCOUNTER — APPOINTMENT (OUTPATIENT)
Dept: FAMILY MEDICINE | Facility: CLINIC | Age: 74
End: 2024-12-02
Payer: MEDICARE

## 2024-12-02 VITALS
OXYGEN SATURATION: 96 % | HEART RATE: 72 BPM | WEIGHT: 138 LBS | HEIGHT: 62.5 IN | BODY MASS INDEX: 24.76 KG/M2 | DIASTOLIC BLOOD PRESSURE: 80 MMHG | SYSTOLIC BLOOD PRESSURE: 140 MMHG | TEMPERATURE: 97.9 F

## 2024-12-02 DIAGNOSIS — J20.9 ACUTE BRONCHITIS, UNSPECIFIED: ICD-10-CM

## 2024-12-02 PROCEDURE — 99214 OFFICE O/P EST MOD 30 MIN: CPT

## 2024-12-02 RX ORDER — AZITHROMYCIN 250 MG/1
250 TABLET, FILM COATED ORAL
Qty: 1 | Refills: 0 | Status: ACTIVE | COMMUNITY
Start: 2024-12-02 | End: 1900-01-01

## 2024-12-02 RX ORDER — PREDNISONE 20 MG/1
20 TABLET ORAL DAILY
Qty: 5 | Refills: 0 | Status: ACTIVE | COMMUNITY
Start: 2024-12-02 | End: 1900-01-01

## 2024-12-03 LAB
INFLUENZA A RESULT: NOT DETECTED
INFLUENZA B RESULT: NOT DETECTED
RESP SYN VIRUS RESULT: NOT DETECTED
SARS-COV-2 RESULT: NOT DETECTED

## 2024-12-11 PROBLEM — U07.1 COVID-19: Status: ACTIVE | Noted: 2024-12-11

## 2024-12-11 RX ORDER — NIRMATRELVIR AND RITONAVIR 300-100 MG
20 X 150 MG & KIT ORAL
Qty: 1 | Refills: 0 | Status: ACTIVE | COMMUNITY
Start: 2024-12-11 | End: 1900-01-01

## 2024-12-11 RX ORDER — FLUTICASONE PROPIONATE 50 UG/1
50 SPRAY, METERED NASAL
Qty: 1 | Refills: 1 | Status: ACTIVE | COMMUNITY
Start: 2024-12-11 | End: 1900-01-01

## 2024-12-17 ENCOUNTER — APPOINTMENT (OUTPATIENT)
Dept: FAMILY MEDICINE | Facility: CLINIC | Age: 74
End: 2024-12-17

## 2024-12-17 VITALS
HEART RATE: 78 BPM | BODY MASS INDEX: 24.58 KG/M2 | TEMPERATURE: 97.7 F | OXYGEN SATURATION: 93 % | HEIGHT: 62.5 IN | DIASTOLIC BLOOD PRESSURE: 78 MMHG | WEIGHT: 137 LBS | SYSTOLIC BLOOD PRESSURE: 136 MMHG

## 2024-12-17 DIAGNOSIS — U07.1 COVID-19: ICD-10-CM

## 2024-12-17 PROCEDURE — 99214 OFFICE O/P EST MOD 30 MIN: CPT

## 2024-12-17 RX ORDER — BENZONATATE 200 MG/1
200 CAPSULE ORAL 3 TIMES DAILY
Qty: 30 | Refills: 0 | Status: ACTIVE | COMMUNITY
Start: 2024-12-17 | End: 1900-01-01

## 2024-12-27 ENCOUNTER — APPOINTMENT (OUTPATIENT)
Dept: FAMILY MEDICINE | Facility: CLINIC | Age: 74
End: 2024-12-27
Payer: MEDICARE

## 2024-12-27 DIAGNOSIS — J01.90 ACUTE SINUSITIS, UNSPECIFIED: ICD-10-CM

## 2024-12-27 DIAGNOSIS — J44.9 CHRONIC OBSTRUCTIVE PULMONARY DISEASE, UNSPECIFIED: ICD-10-CM

## 2024-12-27 PROCEDURE — 99442: CPT

## 2024-12-27 RX ORDER — PREDNISONE 10 MG/1
10 TABLET ORAL
Qty: 21 | Refills: 0 | Status: ACTIVE | COMMUNITY
Start: 2024-12-17 | End: 1900-01-01

## 2024-12-30 ENCOUNTER — APPOINTMENT (OUTPATIENT)
Dept: ORTHOPEDIC SURGERY | Facility: CLINIC | Age: 74
End: 2024-12-30

## 2025-01-10 DIAGNOSIS — C07 MALIGNANT NEOPLASM OF PAROTID GLAND: ICD-10-CM

## 2025-02-04 ENCOUNTER — TRANSCRIPTION ENCOUNTER (OUTPATIENT)
Age: 75
End: 2025-02-04

## 2025-02-07 ENCOUNTER — NON-APPOINTMENT (OUTPATIENT)
Age: 75
End: 2025-02-07

## 2025-02-17 ENCOUNTER — APPOINTMENT (OUTPATIENT)
Dept: FAMILY MEDICINE | Facility: CLINIC | Age: 75
End: 2025-02-17
Payer: MEDICARE

## 2025-02-17 VITALS
HEIGHT: 62.5 IN | SYSTOLIC BLOOD PRESSURE: 110 MMHG | OXYGEN SATURATION: 98 % | DIASTOLIC BLOOD PRESSURE: 68 MMHG | HEART RATE: 84 BPM | TEMPERATURE: 97.6 F | WEIGHT: 136 LBS | BODY MASS INDEX: 24.4 KG/M2

## 2025-02-17 DIAGNOSIS — E03.9 HYPOTHYROIDISM, UNSPECIFIED: ICD-10-CM

## 2025-02-17 DIAGNOSIS — I63.9 CEREBRAL INFARCTION, UNSPECIFIED: ICD-10-CM

## 2025-02-17 DIAGNOSIS — I10 ESSENTIAL (PRIMARY) HYPERTENSION: ICD-10-CM

## 2025-02-17 DIAGNOSIS — R09.82 POSTNASAL DRIP: ICD-10-CM

## 2025-02-17 DIAGNOSIS — E78.5 HYPERLIPIDEMIA, UNSPECIFIED: ICD-10-CM

## 2025-02-17 PROCEDURE — 99214 OFFICE O/P EST MOD 30 MIN: CPT

## 2025-02-17 RX ORDER — CLOPIDOGREL BISULFATE 75 MG/1
75 TABLET, FILM COATED ORAL DAILY
Qty: 1 | Refills: 0 | Status: ACTIVE | COMMUNITY
Start: 2025-02-17 | End: 1900-01-01

## 2025-02-17 RX ORDER — KRILL/OM-3/DHA/EPA/PHOSPHO/AST 1000-230MG
81 CAPSULE ORAL DAILY
Qty: 90 | Refills: 0 | Status: ACTIVE | COMMUNITY
Start: 2025-02-17 | End: 1900-01-01

## 2025-02-24 ENCOUNTER — NON-APPOINTMENT (OUTPATIENT)
Age: 75
End: 2025-02-24

## 2025-02-24 NOTE — ED PROVIDER NOTE - CONSTITUTIONAL [-], MLM
Detail Level: Detailed Quality 226: Preventive Care And Screening: Tobacco Use: Screening And Cessation Intervention: Patient screened for tobacco use and is an ex/non-smoker no fever

## 2025-02-27 ENCOUNTER — RX RENEWAL (OUTPATIENT)
Age: 75
End: 2025-02-27

## 2025-03-03 ENCOUNTER — NON-APPOINTMENT (OUTPATIENT)
Age: 75
End: 2025-03-03

## 2025-03-04 ENCOUNTER — APPOINTMENT (OUTPATIENT)
Dept: FAMILY MEDICINE | Facility: CLINIC | Age: 75
End: 2025-03-04
Payer: MEDICARE

## 2025-03-04 VITALS
DIASTOLIC BLOOD PRESSURE: 76 MMHG | TEMPERATURE: 98.2 F | HEART RATE: 76 BPM | WEIGHT: 137 LBS | OXYGEN SATURATION: 98 % | SYSTOLIC BLOOD PRESSURE: 146 MMHG | BODY MASS INDEX: 24.58 KG/M2 | HEIGHT: 62.5 IN

## 2025-03-04 DIAGNOSIS — I63.9 CEREBRAL INFARCTION, UNSPECIFIED: ICD-10-CM

## 2025-03-04 DIAGNOSIS — E78.5 HYPERLIPIDEMIA, UNSPECIFIED: ICD-10-CM

## 2025-03-04 DIAGNOSIS — D64.9 ANEMIA, UNSPECIFIED: ICD-10-CM

## 2025-03-04 DIAGNOSIS — H66.92 OTITIS MEDIA, UNSPECIFIED, LEFT EAR: ICD-10-CM

## 2025-03-04 DIAGNOSIS — R23.3 SPONTANEOUS ECCHYMOSES: ICD-10-CM

## 2025-03-04 PROCEDURE — 99214 OFFICE O/P EST MOD 30 MIN: CPT

## 2025-03-04 RX ORDER — ATORVASTATIN CALCIUM 40 MG/1
40 TABLET, FILM COATED ORAL
Qty: 1 | Refills: 1 | Status: ACTIVE | COMMUNITY
Start: 2025-03-04 | End: 1900-01-01

## 2025-03-04 RX ORDER — PANTOPRAZOLE 40 MG/1
40 TABLET, DELAYED RELEASE ORAL TWICE DAILY
Qty: 180 | Refills: 0 | Status: ACTIVE | COMMUNITY
Start: 2025-03-04 | End: 1900-01-01

## 2025-03-05 ENCOUNTER — APPOINTMENT (OUTPATIENT)
Dept: OTOLARYNGOLOGY | Facility: CLINIC | Age: 75
End: 2025-03-05

## 2025-03-12 NOTE — ED ADULT NURSE NOTE - CCCP TRG CHIEF CMPLNT
[Cognitive and/or Behavior Therapy] : Cognitive and/or Behavior Therapy  [Interpersonal Therapy] : Interpersonal Therapy  [Motivational Interviewing] : Motivational Interviewing  [Psychodynamic Therapy] : Psychodynamic Therapy  [Psychoeducation] : Psychoeducation  [Skills training (all types)] : Skills training (all types)  [Supportive Therapy] : Supportive Therapy [Recommended Frequency of Visits: ____] : Recommended frequency of visits: [unfilled] [Return in ____ week(s)] : Return in [unfilled] week(s) [FreeTextEntry2] : Goal 1. The patient will recognize and improve daily symptoms of his schizoaffective disorder, bipolar type diagnosis as evidenced by an improvement in scores on mental health scales reviewed in therapy sessions every 1 year and get back to college to complete his degree.   Obj 1- The patient will attend bi weekly therapy sessions and will review symptoms and coping skills during sessions. The patient will practice 1 meaningful activity a day   Obj 2- The patient will take daily meds as prescribed and meet with psychiatrist as scheduled  [de-identified] : The patient was seen in person for the session. The patient came with his father, the patient gave permission for his father to be present during the session. The patient reported that he is doing great and that he had his ECT session yesterday. The patient wasn't sure how many ECT sessions were recommended. The patient was calm and a bit sleepy with some inappropriate giggling. The patient reported no SI/HI/no plan or intent. The patient also stated that he has no auditorial or visual hallucinations, according to the patient the last time he had some auditory hallucinations about a month ago. The patient stated that he had mix of men and women voices but wasn't sure what they were telling the patient. The patient reported that he feels better after the ECT and plans to eat some fast food with his father after the session. No new issues of concerns were brought up during the session apart from the father did feel that the patient needed to go to the ER last time he visited the outpatient clinic. The patient takes medications as prescribed. Throughout the session the patient was provided with active listening, motivational interviewing, emotional support and empathy.  [FreeTextEntry1] : the therapist will meet the patient every 2-3 weeks syncope

## 2025-03-13 LAB
ALBUMIN SERPL ELPH-MCNC: 4 G/DL
ALP BLD-CCNC: 99 U/L
ALT SERPL-CCNC: 6 U/L
ANION GAP SERPL CALC-SCNC: 11 MMOL/L
APTT BLD: 29.2 SEC
AST SERPL-CCNC: 20 U/L
BASOPHILS # BLD AUTO: 0.05 K/UL
BASOPHILS NFR BLD AUTO: 1.1 %
BILIRUB SERPL-MCNC: 0.3 MG/DL
BUN SERPL-MCNC: 19 MG/DL
CALCIUM SERPL-MCNC: 9.5 MG/DL
CHLORIDE SERPL-SCNC: 107 MMOL/L
CHOLEST SERPL-MCNC: 140 MG/DL
CO2 SERPL-SCNC: 23 MMOL/L
CREAT SERPL-MCNC: 0.87 MG/DL
EGFRCR SERPLBLD CKD-EPI 2021: 71 ML/MIN/1.73M2
EOSINOPHIL # BLD AUTO: 0.29 K/UL
EOSINOPHIL NFR BLD AUTO: 6.2 %
FERRITIN SERPL-MCNC: 32 NG/ML
GLUCOSE SERPL-MCNC: 94 MG/DL
HCT VFR BLD CALC: 29.2 %
HDLC SERPL-MCNC: 55 MG/DL
HGB BLD-MCNC: 8.7 G/DL
IMM GRANULOCYTES NFR BLD AUTO: 0.2 %
INR PPP: 0.91 RATIO
IRON SATN MFR SERPL: 47 %
IRON SERPL-MCNC: 163 UG/DL
LDLC SERPL CALC-MCNC: 70 MG/DL
LYMPHOCYTES # BLD AUTO: 0.6 K/UL
LYMPHOCYTES NFR BLD AUTO: 12.8 %
MAN DIFF?: NORMAL
MCHC RBC-ENTMCNC: 28.2 PG
MCHC RBC-ENTMCNC: 29.8 G/DL
MCV RBC AUTO: 94.5 FL
MONOCYTES # BLD AUTO: 0.47 K/UL
MONOCYTES NFR BLD AUTO: 10 %
NEUTROPHILS # BLD AUTO: 3.27 K/UL
NEUTROPHILS NFR BLD AUTO: 69.7 %
NONHDLC SERPL-MCNC: 86 MG/DL
PLATELET # BLD AUTO: 257 K/UL
POTASSIUM SERPL-SCNC: 4.8 MMOL/L
PROT SERPL-MCNC: 6.5 G/DL
PT BLD: 10.8 SEC
RBC # BLD: 3.09 M/UL
RBC # FLD: 14.7 %
SODIUM SERPL-SCNC: 141 MMOL/L
TIBC SERPL-MCNC: 344 UG/DL
TRIGL SERPL-MCNC: 85 MG/DL
UIBC SERPL-MCNC: 181 UG/DL
WBC # FLD AUTO: 4.69 K/UL

## 2025-03-14 LAB — HEMOCCULT STL QL IA: POSITIVE

## 2025-03-21 ENCOUNTER — APPOINTMENT (OUTPATIENT)
Dept: COLORECTAL SURGERY | Facility: CLINIC | Age: 75
End: 2025-03-21
Payer: MEDICARE

## 2025-03-21 PROCEDURE — 99204 OFFICE O/P NEW MOD 45 MIN: CPT | Mod: 25

## 2025-03-21 PROCEDURE — 46600 DIAGNOSTIC ANOSCOPY SPX: CPT

## 2025-03-24 LAB
ANION GAP SERPL CALC-SCNC: 12 MMOL/L
BASOPHILS # BLD AUTO: 0.07 K/UL
BASOPHILS NFR BLD AUTO: 1.4 %
BUN SERPL-MCNC: 19 MG/DL
CALCIUM SERPL-MCNC: 9.2 MG/DL
CHLORIDE SERPL-SCNC: 107 MMOL/L
CO2 SERPL-SCNC: 24 MMOL/L
CREAT SERPL-MCNC: 0.85 MG/DL
EGFRCR SERPLBLD CKD-EPI 2021: 73 ML/MIN/1.73M2
EOSINOPHIL # BLD AUTO: 0.44 K/UL
EOSINOPHIL NFR BLD AUTO: 9 %
GLUCOSE SERPL-MCNC: 97 MG/DL
HCT VFR BLD CALC: 26.5 %
HGB BLD-MCNC: 8.2 G/DL
IMM GRANULOCYTES NFR BLD AUTO: 0.2 %
LYMPHOCYTES # BLD AUTO: 0.57 K/UL
LYMPHOCYTES NFR BLD AUTO: 11.7 %
MAN DIFF?: NORMAL
MCHC RBC-ENTMCNC: 28.7 PG
MCHC RBC-ENTMCNC: 30.9 G/DL
MCV RBC AUTO: 92.7 FL
MONOCYTES # BLD AUTO: 0.57 K/UL
MONOCYTES NFR BLD AUTO: 11.7 %
NEUTROPHILS # BLD AUTO: 3.23 K/UL
NEUTROPHILS NFR BLD AUTO: 66 %
PLATELET # BLD AUTO: 242 K/UL
POTASSIUM SERPL-SCNC: 4.2 MMOL/L
RBC # BLD: 2.86 M/UL
RBC # FLD: 13.9 %
SODIUM SERPL-SCNC: 142 MMOL/L
WBC # FLD AUTO: 4.89 K/UL

## 2025-03-26 ENCOUNTER — APPOINTMENT (OUTPATIENT)
Dept: GASTROENTEROLOGY | Facility: CLINIC | Age: 75
End: 2025-03-26
Payer: MEDICARE

## 2025-03-26 VITALS
WEIGHT: 137 LBS | DIASTOLIC BLOOD PRESSURE: 70 MMHG | HEIGHT: 62.5 IN | BODY MASS INDEX: 24.58 KG/M2 | SYSTOLIC BLOOD PRESSURE: 114 MMHG

## 2025-03-26 DIAGNOSIS — K92.2 GASTROINTESTINAL HEMORRHAGE, UNSPECIFIED: ICD-10-CM

## 2025-03-26 DIAGNOSIS — D64.9 ANEMIA, UNSPECIFIED: ICD-10-CM

## 2025-03-26 PROCEDURE — 99204 OFFICE O/P NEW MOD 45 MIN: CPT

## 2025-04-01 ENCOUNTER — INPATIENT (INPATIENT)
Facility: HOSPITAL | Age: 75
LOS: 1 days | Discharge: HOME | DRG: 379 | End: 2025-04-03
Attending: HOSPITALIST | Admitting: HOSPITALIST
Payer: MEDICARE

## 2025-04-01 VITALS — WEIGHT: 134.92 LBS

## 2025-04-01 DIAGNOSIS — K92.2 GASTROINTESTINAL HEMORRHAGE, UNSPECIFIED: ICD-10-CM

## 2025-04-01 DIAGNOSIS — M12.9 ARTHROPATHY, UNSPECIFIED: Chronic | ICD-10-CM

## 2025-04-01 DIAGNOSIS — C80.1 MALIGNANT (PRIMARY) NEOPLASM, UNSPECIFIED: Chronic | ICD-10-CM

## 2025-04-01 DIAGNOSIS — K11.9 DISEASE OF SALIVARY GLAND, UNSPECIFIED: Chronic | ICD-10-CM

## 2025-04-01 DIAGNOSIS — Z90.89 ACQUIRED ABSENCE OF OTHER ORGANS: Chronic | ICD-10-CM

## 2025-04-01 DIAGNOSIS — C50.919 MALIGNANT NEOPLASM OF UNSPECIFIED SITE OF UNSPECIFIED FEMALE BREAST: Chronic | ICD-10-CM

## 2025-04-01 DIAGNOSIS — Z98.890 OTHER SPECIFIED POSTPROCEDURAL STATES: Chronic | ICD-10-CM

## 2025-04-01 LAB
ABO RH CONFIRMATION: SIGNIFICANT CHANGE UP
ALBUMIN SERPL ELPH-MCNC: 3.2 G/DL — LOW (ref 3.3–5)
ALP SERPL-CCNC: 76 U/L — SIGNIFICANT CHANGE UP (ref 40–120)
ALT FLD-CCNC: 14 U/L — SIGNIFICANT CHANGE UP (ref 12–78)
ANION GAP SERPL CALC-SCNC: 4 MMOL/L — LOW (ref 5–17)
APTT BLD: 27.6 SEC — SIGNIFICANT CHANGE UP (ref 24.5–35.6)
AST SERPL-CCNC: 24 U/L — SIGNIFICANT CHANGE UP (ref 15–37)
BASOPHILS # BLD AUTO: 0.06 K/UL — SIGNIFICANT CHANGE UP (ref 0–0.2)
BASOPHILS NFR BLD AUTO: 1 % — SIGNIFICANT CHANGE UP (ref 0–2)
BILIRUB SERPL-MCNC: 0.3 MG/DL — SIGNIFICANT CHANGE UP (ref 0.2–1.2)
BLD GP AB SCN SERPL QL: SIGNIFICANT CHANGE UP
BUN SERPL-MCNC: 23 MG/DL — SIGNIFICANT CHANGE UP (ref 7–23)
CALCIUM SERPL-MCNC: 8.9 MG/DL — SIGNIFICANT CHANGE UP (ref 8.5–10.1)
CHLORIDE SERPL-SCNC: 110 MMOL/L — HIGH (ref 96–108)
CO2 SERPL-SCNC: 24 MMOL/L — SIGNIFICANT CHANGE UP (ref 22–31)
CREAT SERPL-MCNC: 0.8 MG/DL — SIGNIFICANT CHANGE UP (ref 0.5–1.3)
EGFR: 78 ML/MIN/1.73M2 — SIGNIFICANT CHANGE UP
EGFR: 78 ML/MIN/1.73M2 — SIGNIFICANT CHANGE UP
EOSINOPHIL # BLD AUTO: 0.47 K/UL — SIGNIFICANT CHANGE UP (ref 0–0.5)
EOSINOPHIL NFR BLD AUTO: 8 % — HIGH (ref 0–6)
GLUCOSE SERPL-MCNC: 98 MG/DL — SIGNIFICANT CHANGE UP (ref 70–99)
HCT VFR BLD CALC: 24.6 % — LOW (ref 34.5–45)
HCT VFR BLD CALC: 30.1 % — LOW (ref 34.5–45)
HGB BLD-MCNC: 7.6 G/DL — LOW (ref 11.5–15.5)
HGB BLD-MCNC: 9.5 G/DL — LOW (ref 11.5–15.5)
IMM GRANULOCYTES # BLD AUTO: 0.02 K/UL — SIGNIFICANT CHANGE UP (ref 0–0.07)
IMM GRANULOCYTES NFR BLD AUTO: 0.3 % — SIGNIFICANT CHANGE UP (ref 0–0.9)
INR BLD: 1.03 RATIO — SIGNIFICANT CHANGE UP (ref 0.85–1.16)
LACTATE SERPL-SCNC: 1.1 MMOL/L — SIGNIFICANT CHANGE UP (ref 0.7–2)
LIDOCAIN IGE QN: 47 U/L — SIGNIFICANT CHANGE UP (ref 13–75)
LYMPHOCYTES # BLD AUTO: 0.74 K/UL — LOW (ref 1–3.3)
LYMPHOCYTES NFR BLD AUTO: 12.5 % — LOW (ref 13–44)
MAGNESIUM SERPL-MCNC: 2.1 MG/DL — SIGNIFICANT CHANGE UP (ref 1.6–2.6)
MCHC RBC-ENTMCNC: 28.4 PG — SIGNIFICANT CHANGE UP (ref 27–34)
MCHC RBC-ENTMCNC: 30.9 G/DL — LOW (ref 32–36)
MCV RBC AUTO: 91.8 FL — SIGNIFICANT CHANGE UP (ref 80–100)
MONOCYTES # BLD AUTO: 0.59 K/UL — SIGNIFICANT CHANGE UP (ref 0–0.9)
MONOCYTES NFR BLD AUTO: 10 % — SIGNIFICANT CHANGE UP (ref 2–14)
NEUTROPHILS # BLD AUTO: 4.03 K/UL — SIGNIFICANT CHANGE UP (ref 1.8–7.4)
NEUTROPHILS NFR BLD AUTO: 68.2 % — SIGNIFICANT CHANGE UP (ref 43–77)
NRBC # BLD AUTO: 0 K/UL — SIGNIFICANT CHANGE UP (ref 0–0)
NRBC # FLD: 0 K/UL — SIGNIFICANT CHANGE UP (ref 0–0)
NRBC BLD AUTO-RTO: 0 /100 WBCS — SIGNIFICANT CHANGE UP (ref 0–0)
PLATELET # BLD AUTO: 243 K/UL — SIGNIFICANT CHANGE UP (ref 150–400)
PMV BLD: 9.7 FL — SIGNIFICANT CHANGE UP (ref 7–13)
POTASSIUM SERPL-MCNC: 4.2 MMOL/L — SIGNIFICANT CHANGE UP (ref 3.5–5.3)
POTASSIUM SERPL-SCNC: 4.2 MMOL/L — SIGNIFICANT CHANGE UP (ref 3.5–5.3)
PROT SERPL-MCNC: 6.8 GM/DL — SIGNIFICANT CHANGE UP (ref 6–8.3)
PROTHROM AB SERPL-ACNC: 12.1 SEC — SIGNIFICANT CHANGE UP (ref 9.9–13.4)
RBC # BLD: 2.68 M/UL — LOW (ref 3.8–5.2)
RBC # FLD: 13.8 % — SIGNIFICANT CHANGE UP (ref 10.3–14.5)
SODIUM SERPL-SCNC: 138 MMOL/L — SIGNIFICANT CHANGE UP (ref 135–145)
WBC # BLD: 5.91 K/UL — SIGNIFICANT CHANGE UP (ref 3.8–10.5)
WBC # FLD AUTO: 5.91 K/UL — SIGNIFICANT CHANGE UP (ref 3.8–10.5)

## 2025-04-01 PROCEDURE — 36430 TRANSFUSION BLD/BLD COMPNT: CPT

## 2025-04-01 PROCEDURE — 94640 AIRWAY INHALATION TREATMENT: CPT

## 2025-04-01 PROCEDURE — 80048 BASIC METABOLIC PNL TOTAL CA: CPT

## 2025-04-01 PROCEDURE — 85014 HEMATOCRIT: CPT

## 2025-04-01 PROCEDURE — 93010 ELECTROCARDIOGRAM REPORT: CPT

## 2025-04-01 PROCEDURE — 85018 HEMOGLOBIN: CPT

## 2025-04-01 PROCEDURE — 99285 EMERGENCY DEPT VISIT HI MDM: CPT

## 2025-04-01 PROCEDURE — 36415 COLL VENOUS BLD VENIPUNCTURE: CPT

## 2025-04-01 PROCEDURE — 86920 COMPATIBILITY TEST SPIN: CPT

## 2025-04-01 PROCEDURE — 85027 COMPLETE CBC AUTOMATED: CPT

## 2025-04-01 PROCEDURE — 99222 1ST HOSP IP/OBS MODERATE 55: CPT

## 2025-04-01 PROCEDURE — P9016: CPT

## 2025-04-01 PROCEDURE — 99223 1ST HOSP IP/OBS HIGH 75: CPT

## 2025-04-01 RX ORDER — MAGNESIUM, ALUMINUM HYDROXIDE 200-200 MG
30 TABLET,CHEWABLE ORAL EVERY 4 HOURS
Refills: 0 | Status: DISCONTINUED | OUTPATIENT
Start: 2025-04-01 | End: 2025-04-03

## 2025-04-01 RX ORDER — POLYETHYLENE GLYCOL-3350 AND ELECTROLYTES 236; 6.74; 5.86; 2.97; 22.74 G/274.31G; G/274.31G; G/274.31G; G/274.31G; G/274.31G
4000 POWDER, FOR SOLUTION ORAL ONCE
Refills: 0 | Status: COMPLETED | OUTPATIENT
Start: 2025-04-01 | End: 2025-04-01

## 2025-04-01 RX ORDER — FERROUS SULFATE 137(45) MG
325 TABLET, EXTENDED RELEASE ORAL DAILY
Refills: 0 | Status: DISCONTINUED | OUTPATIENT
Start: 2025-04-01 | End: 2025-04-03

## 2025-04-01 RX ORDER — LOSARTAN POTASSIUM 100 MG/1
50 TABLET, FILM COATED ORAL DAILY
Refills: 0 | Status: DISCONTINUED | OUTPATIENT
Start: 2025-04-01 | End: 2025-04-03

## 2025-04-01 RX ORDER — ALBUTEROL SULFATE 2.5 MG/3ML
2 VIAL, NEBULIZER (ML) INHALATION EVERY 6 HOURS
Refills: 0 | Status: DISCONTINUED | OUTPATIENT
Start: 2025-04-01 | End: 2025-04-03

## 2025-04-01 RX ORDER — ASPIRIN 325 MG
1 TABLET ORAL
Refills: 0 | DISCHARGE

## 2025-04-01 RX ORDER — ACETAMINOPHEN 500 MG/5ML
650 LIQUID (ML) ORAL EVERY 6 HOURS
Refills: 0 | Status: DISCONTINUED | OUTPATIENT
Start: 2025-04-01 | End: 2025-04-03

## 2025-04-01 RX ORDER — FERROUS SULFATE 137(45) MG
1 TABLET, EXTENDED RELEASE ORAL
Refills: 0 | DISCHARGE

## 2025-04-01 RX ORDER — ESCITALOPRAM OXALATE 20 MG/1
20 TABLET ORAL DAILY
Refills: 0 | Status: DISCONTINUED | OUTPATIENT
Start: 2025-04-01 | End: 2025-04-03

## 2025-04-01 RX ORDER — FLUTICASONE FUROATE, UMECLIDINIUM BROMIDE AND VILANTEROL TRIFENATATE 100; 62.5; 25 UG/1; UG/1; UG/1
1 POWDER RESPIRATORY (INHALATION)
Refills: 0 | DISCHARGE

## 2025-04-01 RX ORDER — ATORVASTATIN CALCIUM 80 MG/1
1 TABLET, FILM COATED ORAL
Refills: 0 | DISCHARGE

## 2025-04-01 RX ORDER — INFLUENZA A VIRUS A/IDAHO/07/2018 (H1N1) ANTIGEN (MDCK CELL DERIVED, PROPIOLACTONE INACTIVATED, INFLUENZA A VIRUS A/INDIANA/08/2018 (H3N2) ANTIGEN (MDCK CELL DERIVED, PROPIOLACTONE INACTIVATED), INFLUENZA B VIRUS B/SINGAPORE/INFTT-16-0610/2016 ANTIGEN (MDCK CELL DERIVED, PROPIOLACTONE INACTIVATED), INFLUENZA B VIRUS B/IOWA/06/2017 ANTIGEN (MDCK CELL DERIVED, PROPIOLACTONE INACTIVATED) 15; 15; 15; 15 UG/.5ML; UG/.5ML; UG/.5ML; UG/.5ML
0.5 INJECTION, SUSPENSION INTRAMUSCULAR ONCE
Refills: 0 | Status: DISCONTINUED | OUTPATIENT
Start: 2025-04-01 | End: 2025-04-03

## 2025-04-01 RX ORDER — MELATONIN 5 MG
3 TABLET ORAL AT BEDTIME
Refills: 0 | Status: DISCONTINUED | OUTPATIENT
Start: 2025-04-01 | End: 2025-04-03

## 2025-04-01 RX ORDER — ATORVASTATIN CALCIUM 80 MG/1
40 TABLET, FILM COATED ORAL AT BEDTIME
Refills: 0 | Status: DISCONTINUED | OUTPATIENT
Start: 2025-04-01 | End: 2025-04-03

## 2025-04-01 RX ORDER — ONDANSETRON HCL/PF 4 MG/2 ML
4 VIAL (ML) INJECTION EVERY 8 HOURS
Refills: 0 | Status: DISCONTINUED | OUTPATIENT
Start: 2025-04-01 | End: 2025-04-03

## 2025-04-01 RX ORDER — LEVOTHYROXINE SODIUM 300 MCG
50 TABLET ORAL DAILY
Refills: 0 | Status: DISCONTINUED | OUTPATIENT
Start: 2025-04-01 | End: 2025-04-03

## 2025-04-01 RX ADMIN — LOSARTAN POTASSIUM 50 MILLIGRAM(S): 100 TABLET, FILM COATED ORAL at 17:25

## 2025-04-01 RX ADMIN — ATORVASTATIN CALCIUM 40 MILLIGRAM(S): 80 TABLET, FILM COATED ORAL at 23:08

## 2025-04-01 RX ADMIN — Medication 40 MILLIGRAM(S): at 23:07

## 2025-04-01 RX ADMIN — POLYETHYLENE GLYCOL-3350 AND ELECTROLYTES 4000 MILLILITER(S): 236; 6.74; 5.86; 2.97; 22.74 POWDER, FOR SOLUTION ORAL at 12:00

## 2025-04-01 NOTE — H&P ADULT - NSHPPHYSICALEXAM_GEN_ALL_CORE
ICU Vital Signs Last 24 Hrs  T(C): 36.7 (01 Apr 2025 11:48), Max: 36.7 (01 Apr 2025 11:48)  T(F): 98.1 (01 Apr 2025 11:48), Max: 98.1 (01 Apr 2025 11:48)  HR: 64 (01 Apr 2025 11:48) (64 - 90)  BP: 147/79 (01 Apr 2025 11:48) (134/98 - 147/79)  BP(mean): 95 (01 Apr 2025 11:48) (95 - 103)  ABP: --  ABP(mean): --  RR: 18 (01 Apr 2025 11:48) (18 - 20)  SpO2: 97% (01 Apr 2025 11:48) (97% - 98%)    O2 Parameters below as of 01 Apr 2025 11:48  Patient On (Oxygen Delivery Method): room air            PHYSICAL EXAM:    Constitutional: NAD, awake and alert  HEENT: PERR, EOMI, Normal Hearing, MMM  Neck: Soft and supple, No LAD, No JVD. + chronic left paralysis  Respiratory: Breath sounds are clear bilaterally, No wheezing, rales or rhonchi  Cardiovascular: S1 and S2, regular rate and rhythm, no Murmurs, gallops or rubs  Gastrointestinal: Bowel Sounds present, soft, nontender, nondistended, no guarding, no rebound  Extremities: No peripheral edema  Vascular: 2+ peripheral pulses  Neurological: A/O x 3, no focal deficits  Musculoskeletal: 5/5 strength b/l upper and lower extremities  Skin: No rashes

## 2025-04-01 NOTE — ED PROVIDER NOTE - PHYSICAL EXAMINATION
GEN: Patient awake alert NAD.   HEENT: normocephalic, atraumatic, EOMI, no scleral icterus, moist MM  CARDIAC: RRR, S1, S2, no murmur.   PULM: CTA B/L no wheeze, rhonchi, rales.   ABD: soft NT, ND, no rebound no guarding,   MSK: Moving all extremities, no edema.   NEURO: A&Ox3, no focal neurological deficits,  SKIN: warm, dry, no rash.

## 2025-04-01 NOTE — ED ADULT NURSE NOTE - NSFALLRISKINTERV_ED_ALL_ED

## 2025-04-01 NOTE — CONSULT NOTE ADULT - SUBJECTIVE AND OBJECTIVE BOX
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 PT states passage of blood is painless and scant with each bowel mov't. States she was seen at [facility] and transfused with PRBC for hgb of 8-9. She is symptomatic today and endorsing sob with only few steps. No chest pain or dizziness. Has chronic cough.   Hgb in Ed 7.6, last recorded in our database was 11.   Last dose of dapt yesterday.  Vitals stable.   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     PAST SURGICAL HISTORY:  Arthropathy b/l knees (one in 2005 and other side in 2010)    Breast cancer 2002 right lumpectomy with post op chemotherapy and RT s/p lymph node dissection    Cancer [id number] inserted for chemotherapy in 2002 and then removed when chemotherapy completed    History of surgery left brow lift 7/2019    Mass of left parotid gland Excision of parotid mass with left neck dissection & graft 3/2019    Parotid mass diagnosed in 2007 with excision of left parotid ("adenoid cystic carcinoma") with post op RT    S/P tonsillectomy.     FAMILY HISTORY:  Father  Still living? No  Family history of heart disease, Age at diagnosis: Age Unknown.     Tobacco Usage:  · Tobacco Usage	Smoker. current status unknown    ALLERGIES AND HOME MEDICATIONS:   Allergies:        Allergies:  	Augmentin: Drug, Rash     (01 Apr 2025 12:51)      PAST MEDICAL & SURGICAL HISTORY:  COPD (chronic obstructive pulmonary disease)  controlled; intubated x1 about 8 years ago      Hypertension      Hypothyroid      Lung mass  2019      Anemia  during chemotherapy for right breast cancer in 2002      Breast cancer  diagnosed in 2002, had right lumpectomy (estrogen receptive positive, HER 2 positive 3) with post op chemotherapy and RT      Parotid mass  diagnosed in 2007 ("adenoid cystic carcinoma) of left parotid -- had excision with post op RT      Alcohol abuse  quit 30 years ago      Cancer  2019, recurrence of left parotid cancer, s/p facial surgery 3/2019      Anxiety      Dry eyes  left      Heartburn      Herpes zoster      Dysphagia      Breast cancer  2002 right lumpectomy with post op chemotherapy and RT s/p lymph node dissection      Parotid mass  diagnosed in 2007 with excision of left parotid ("adenoid cystic carcinoma") with post op RT      Arthropathy  b/l knees (one in 2005 and other side in 2010)      Cancer  [id number] inserted for chemotherapy in 2002 and then removed when chemotherapy completed      Mass of left parotid gland  Excision of parotid mass with left neck dissection & graft 3/2019      S/P tonsillectomy      History of surgery  left brow lift 7/2019          MEDICATIONS  (STANDING):  atorvastatin 40 milliGRAM(s) Oral at bedtime  cholecalciferol 1000 Unit(s) Oral daily  escitalopram 20 milliGRAM(s) Oral daily  ferrous    sulfate 325 milliGRAM(s) Oral daily  fluticasone propionate/ salmeterol 250-50 MICROgram(s) Diskus 1 Dose(s) Inhalation two times a day  levothyroxine 50 MICROGram(s) Oral daily  losartan 50 milliGRAM(s) Oral daily  pantoprazole    Tablet 40 milliGRAM(s) Oral every 12 hours    MEDICATIONS  (PRN):  acetaminophen     Tablet .. 650 milliGRAM(s) Oral every 6 hours PRN Temp greater or equal to 38C (100.4F), Mild Pain (1 - 3)  albuterol    90 MICROgram(s) HFA Inhaler 2 Puff(s) Inhalation every 6 hours PRN Bronchospasm  aluminum hydroxide/magnesium hydroxide/simethicone Suspension 30 milliLiter(s) Oral every 4 hours PRN Dyspepsia  melatonin 3 milliGRAM(s) Oral at bedtime PRN Insomnia  ondansetron Injectable 4 milliGRAM(s) IV Push every 8 hours PRN Nausea and/or Vomiting      Allergies    Augmentin (Rash)    Intolerances        SOCIAL HISTORY:    FAMILY HISTORY:  Family history of heart disease (Father)        REVIEW OF SYSTEMS:    CONSTITUTIONAL: No weakness, fevers or chills  EYES/ENT: No visual changes;  No vertigo or throat pain   NECK: No pain or stiffness  RESPIRATORY: No cough, wheezing, hemoptysis; No shortness of breath  CARDIOVASCULAR: No chest pain or palpitations  GASTROINTESTINAL: No abdominal or epigastric pain. No nausea, vomiting, or hematemesis; No diarrhea or constipation. No melena or hematochezia.  GENITOURINARY: No dysuria, frequency or hematuria  NEUROLOGICAL: No numbness or weakness  SKIN: No itching, burning, rashes, or lesions   PSYCH: Normal mood and affect  All other review of systems is negative unless indicated above.    Vital Signs Last 24 Hrs  T(C): 36.4 (01 Apr 2025 13:45), Max: 36.7 (01 Apr 2025 11:48)  T(F): 97.6 (01 Apr 2025 13:45), Max: 98.1 (01 Apr 2025 11:48)  HR: 67 (01 Apr 2025 13:45) (64 - 90)  BP: 150/62 (01 Apr 2025 13:45) (134/98 - 165/48)  BP(mean): 74 (01 Apr 2025 13:45) (74 - 103)  RR: 18 (01 Apr 2025 13:45) (18 - 20)  SpO2: 98% (01 Apr 2025 13:45) (92% - 98%)    Parameters below as of 01 Apr 2025 13:45  Patient On (Oxygen Delivery Method): room air        PHYSICAL EXAM:    Constitutional: No acute distress, well-developed, non-toxic appearing  HEENT: good phonation, not icteric  Neck: supple, no lymphadenopathy  Respiratory: clear to ascultation bilaterally, no wheezing  Cardiovascular: S1 and S2, regular rate and rhythm, no murmurs rubs or gallops  Gastrointestinal: soft, non-tender, non-distended, +bowel sounds, no rebound or guarding, no surgical scars, no drains  Extremities: No peripheral edema, no cyanosis or clubbing  Vascular: 2+ peripheral pulses, no venous stasis  Neurological: A/O x 3, no focal deficits, no asterixis  Psychiatric: Normal mood, normal affect  Skin: No rashes, not jaundiced    LABS:                        7.6    5.91  )-----------( 243      ( 01 Apr 2025 08:39 )             24.6     04-01    138  |  110[H]  |  23  ----------------------------<  98  4.2   |  24  |  0.80    Ca    8.9      01 Apr 2025 08:39  Mg     2.1     04-01    TPro  6.8  /  Alb  3.2[L]  /  TBili  0.3  /  DBili  x   /  AST  24  /  ALT  14  /  AlkPhos  76  04-01    PT/INR - ( 01 Apr 2025 08:39 )   PT: 12.1 sec;   INR: 1.03 ratio         PTT - ( 01 Apr 2025 08:39 )  PTT:27.6 sec  LIVER FUNCTIONS - ( 01 Apr 2025 08:39 )  Alb: 3.2 g/dL / Pro: 6.8 gm/dL / ALK PHOS: 76 U/L / ALT: 14 U/L / AST: 24 U/L / GGT: x             RADIOLOGY & ADDITIONAL STUDIES: Patient is a 74y old  Female who presents with a chief complaint of blood per rectum      HPI:  This is a 74 year old female with history of recent CVA in 1/2025 on copd DAPT, COPD not on home o2, CVA with no residual deficits, hypertension, hyperlipidemia,  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 PT states passage of blood is painless and scant with each bowel mov't. States she was seen at [facility] and transfused with PRBC for hgb of 8-9. She is symptomatic today and endorsing sob with only few steps. No chest pain or dizziness. Has chronic cough. Evaluated outpatient by GI with recommendation for presentation to ED for evaluation of anemia, transfusion, and endoscopic evaluation. Hgb 7.6 on arrival with PRBC transfusing. Endorses mixed BRB and dark ?black stools over past 4-6 weeks. Denies abdominal pain, chest pain but does endorse weakness and increased dyspnea on exertion.     PAST MEDICAL & SURGICAL HISTORY:  COPD (chronic obstructive pulmonary disease)  controlled; intubated x1 about 8 years ago      Hypertension      Hypothyroid      Lung mass  2019      Anemia  during chemotherapy for right breast cancer in 2002      Breast cancer  diagnosed in 2002, had right lumpectomy (estrogen receptive positive, HER 2 positive 3) with post op chemotherapy and RT      Parotid mass  diagnosed in 2007 ("adenoid cystic carcinoma) of left parotid -- had excision with post op RT      Alcohol abuse  quit 30 years ago      Cancer  2019, recurrence of left parotid cancer, s/p facial surgery 3/2019      Anxiety      Dry eyes  left      Heartburn      Herpes zoster      Dysphagia      Breast cancer  2002 right lumpectomy with post op chemotherapy and RT s/p lymph node dissection      Parotid mass  diagnosed in 2007 with excision of left parotid ("adenoid cystic carcinoma") with post op RT      Arthropathy  b/l knees (one in 2005 and other side in 2010)      Cancer  [id number] inserted for chemotherapy in 2002 and then removed when chemotherapy completed      Mass of left parotid gland  Excision of parotid mass with left neck dissection & graft 3/2019      S/P tonsillectomy      History of surgery  left brow lift 7/2019    MEDICATIONS  (STANDING):  atorvastatin 40 milliGRAM(s) Oral at bedtime  cholecalciferol 1000 Unit(s) Oral daily  escitalopram 20 milliGRAM(s) Oral daily  ferrous    sulfate 325 milliGRAM(s) Oral daily  fluticasone propionate/ salmeterol 250-50 MICROgram(s) Diskus 1 Dose(s) Inhalation two times a day  levothyroxine 50 MICROGram(s) Oral daily  losartan 50 milliGRAM(s) Oral daily  pantoprazole    Tablet 40 milliGRAM(s) Oral every 12 hours    MEDICATIONS  (PRN):  acetaminophen     Tablet .. 650 milliGRAM(s) Oral every 6 hours PRN Temp greater or equal to 38C (100.4F), Mild Pain (1 - 3)  albuterol    90 MICROgram(s) HFA Inhaler 2 Puff(s) Inhalation every 6 hours PRN Bronchospasm  aluminum hydroxide/magnesium hydroxide/simethicone Suspension 30 milliLiter(s) Oral every 4 hours PRN Dyspepsia  melatonin 3 milliGRAM(s) Oral at bedtime PRN Insomnia  ondansetron Injectable 4 milliGRAM(s) IV Push every 8 hours PRN Nausea and/or Vomiting      Allergies    Augmentin (Rash)    Intolerances    SOCIAL HISTORY:    FAMILY HISTORY:  Family history of heart disease (Father)    REVIEW OF SYSTEMS:    CONSTITUTIONAL: No weakness, fevers or chills  EYES/ENT: No visual changes;  No vertigo or throat pain   NECK: No pain or stiffness  RESPIRATORY: No cough, wheezing, hemoptysis; No shortness of breath  CARDIOVASCULAR: No chest pain or palpitations  GASTROINTESTINAL: See HPI  GENITOURINARY: No dysuria, frequency or hematuria  NEUROLOGICAL: No numbness or weakness  SKIN: No itching, burning, rashes, or lesions   PSYCH: Normal mood and affect  All other review of systems is negative unless indicated above.    Vital Signs Last 24 Hrs  T(C): 36.4 (01 Apr 2025 13:45), Max: 36.7 (01 Apr 2025 11:48)  T(F): 97.6 (01 Apr 2025 13:45), Max: 98.1 (01 Apr 2025 11:48)  HR: 67 (01 Apr 2025 13:45) (64 - 90)  BP: 150/62 (01 Apr 2025 13:45) (134/98 - 165/48)  BP(mean): 74 (01 Apr 2025 13:45) (74 - 103)  RR: 18 (01 Apr 2025 13:45) (18 - 20)  SpO2: 98% (01 Apr 2025 13:45) (92% - 98%)    Parameters below as of 01 Apr 2025 13:45  Patient On (Oxygen Delivery Method): room air        PHYSICAL EXAM:    Constitutional: No acute distress, well-developed, non-toxic appearing  HEENT: good phonation, not icteric  Neck: supple, no lymphadenopathy  Respiratory: clear to ascultation bilaterally, no wheezing  Cardiovascular: S1 and S2, regular rate and rhythm, no murmurs rubs or gallops  Gastrointestinal: soft, non-tender, non-distended, +bowel sounds, no rebound or guarding, no surgical scars, no drains  Extremities: No peripheral edema, no cyanosis or clubbing  Vascular: 2+ peripheral pulses, no venous stasis  Neurological: A/O x 3, no focal deficits, no asterixis  Psychiatric: Normal mood, normal affect  Skin: No rashes, not jaundiced    LABS:                        7.6    5.91  )-----------( 243      ( 01 Apr 2025 08:39 )             24.6     04-01    138  |  110[H]  |  23  ----------------------------<  98  4.2   |  24  |  0.80    Ca    8.9      01 Apr 2025 08:39  Mg     2.1     04-01    TPro  6.8  /  Alb  3.2[L]  /  TBili  0.3  /  DBili  x   /  AST  24  /  ALT  14  /  AlkPhos  76  04-01    PT/INR - ( 01 Apr 2025 08:39 )   PT: 12.1 sec;   INR: 1.03 ratio         PTT - ( 01 Apr 2025 08:39 )  PTT:27.6 sec  LIVER FUNCTIONS - ( 01 Apr 2025 08:39 )  Alb: 3.2 g/dL / Pro: 6.8 gm/dL / ALK PHOS: 76 U/L / ALT: 14 U/L / AST: 24 U/L / GGT: x             RADIOLOGY & ADDITIONAL STUDIES: Patient is a 74y old  Female who presents with a chief complaint of GI bleed,  blood per rectum    74 year old woman with recent CVA in 1/2025 onj DAPT, COPD not on home o2, HTN, HLD, 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 Admitted for symptomatic anemia. Consulted for GI bleed.    Patient states that over the last month she has noted a rectal bleeding. States blood is bright red, with stools, but sometimes has darker marroon and even black stools. Patient states passage of blood is painless and scant with each bowel movement. States she was seen at [facility] and transfused with PRBC for hgb of 8-9. She is symptomatic today and endorsing shortness of breath  with only few steps. No chest pain or dizziness. Has chronic cough. Evaluated outpatient by GI with recommendation for presentation to ED for evaluation of anemia, transfusion, and endoscopic evaluation. Hgb 7.6 on arrival with PRBC transfusing. Endorses mixed BRB and dark ?black stools over past 4-6 weeks. Denies abdominal pain, chest pain but does endorse weakness and increased dyspnea on exertion. No weight loss. No change in BM's.     PAST MEDICAL & SURGICAL HISTORY:  COPD (chronic obstructive pulmonary disease)    controlled; intubated x1 about 8 years ago      Hypertension      Hypothyroid      Lung mass  2019      Anemia  during chemotherapy for right breast cancer in 2002      Breast cancer  diagnosed in 2002, had right lumpectomy (estrogen receptive positive, HER 2 positive 3) with post op chemotherapy and RT      Parotid mass  diagnosed in 2007 ("adenoid cystic carcinoma) of left parotid -- had excision with post op RT      Alcohol abuse  quit 30 years ago      Cancer  2019, recurrence of left parotid cancer, s/p facial surgery 3/2019      Anxiety      Dry eyes  left      Heartburn      Herpes zoster      Dysphagia      Breast cancer  2002 right lumpectomy with post op chemotherapy and RT s/p lymph node dissection      Parotid mass  diagnosed in 2007 with excision of left parotid ("adenoid cystic carcinoma") with post op RT      Arthropathy  b/l knees (one in 2005 and other side in 2010)      Cancer  [id number] inserted for chemotherapy in 2002 and then removed when chemotherapy completed      Mass of left parotid gland  Excision of parotid mass with left neck dissection & graft 3/2019      S/P tonsillectomy      History of surgery  left brow lift 7/2019    CVA on DAPT    MEDICATIONS  (STANDING):  atorvastatin 40 milliGRAM(s) Oral at bedtime  cholecalciferol 1000 Unit(s) Oral daily  escitalopram 20 milliGRAM(s) Oral daily  ferrous    sulfate 325 milliGRAM(s) Oral daily  fluticasone propionate/ salmeterol 250-50 MICROgram(s) Diskus 1 Dose(s) Inhalation two times a day  levothyroxine 50 MICROGram(s) Oral daily  losartan 50 milliGRAM(s) Oral daily  pantoprazole    Tablet 40 milliGRAM(s) Oral every 12 hours    MEDICATIONS  (PRN):  acetaminophen     Tablet .. 650 milliGRAM(s) Oral every 6 hours PRN Temp greater or equal to 38C (100.4F), Mild Pain (1 - 3)  albuterol    90 MICROgram(s) HFA Inhaler 2 Puff(s) Inhalation every 6 hours PRN Bronchospasm  aluminum hydroxide/magnesium hydroxide/simethicone Suspension 30 milliLiter(s) Oral every 4 hours PRN Dyspepsia  melatonin 3 milliGRAM(s) Oral at bedtime PRN Insomnia  ondansetron Injectable 4 milliGRAM(s) IV Push every 8 hours PRN Nausea and/or Vomiting      Allergies    Augmentin (Rash)    Intolerances    SOCIAL HISTORY:  no smoking, no drinking no drugs, remote drinker    FAMILY HISTORY:  Family history of heart disease (Father)  no colon or stomach cancer    REVIEW OF SYSTEMS:    CONSTITUTIONAL: No weakness, fevers or chills  EYES/ENT: No visual changes;  No vertigo or throat pain   NECK: No pain or stiffness  RESPIRATORY: see HPI  CARDIOVASCULAR: No chest pain or palpitations  GASTROINTESTINAL: See HPI  GENITOURINARY: No dysuria, frequency or hematuria  NEUROLOGICAL: No numbness or weakness  SKIN: No itching, burning, rashes, or lesions   PSYCH: Normal mood and affect  All other review of systems is negative unless indicated above.    Vital Signs Last 24 Hrs  T(C): 36.4 (01 Apr 2025 13:45), Max: 36.7 (01 Apr 2025 11:48)  T(F): 97.6 (01 Apr 2025 13:45), Max: 98.1 (01 Apr 2025 11:48)  HR: 67 (01 Apr 2025 13:45) (64 - 90)  BP: 150/62 (01 Apr 2025 13:45) (134/98 - 165/48)  BP(mean): 74 (01 Apr 2025 13:45) (74 - 103)  RR: 18 (01 Apr 2025 13:45) (18 - 20)  SpO2: 98% (01 Apr 2025 13:45) (92% - 98%)    Parameters below as of 01 Apr 2025 13:45  Patient On (Oxygen Delivery Method): room air        PHYSICAL EXAM:    Constitutional: No acute distress, well-developed, non-toxic appearing  HEENT: good phonation, not icteric  Neck: supple, no lymphadenopathy  Respiratory: unlabored  Gastrointestinal: soft, non-tender, non-distended, +bowel sounds, no rebound or guarding,   Extremities: No peripheral edema, no cyanosis or clubbing  Vascular: 2+ peripheral pulses, no venous stasis  Neurological: A/O x 3, no focal deficits, no asterixis  Psychiatric: Normal mood, normal affect  Skin: No rashes, not jaundiced    LABS:                        7.6    5.91  )-----------( 243      ( 01 Apr 2025 08:39 )             24.6     04-01    138  |  110[H]  |  23  ----------------------------<  98  4.2   |  24  |  0.80    Ca    8.9      01 Apr 2025 08:39  Mg     2.1     04-01    TPro  6.8  /  Alb  3.2[L]  /  TBili  0.3  /  DBili  x   /  AST  24  /  ALT  14  /  AlkPhos  76  04-01    PT/INR - ( 01 Apr 2025 08:39 )   PT: 12.1 sec;   INR: 1.03 ratio         PTT - ( 01 Apr 2025 08:39 )  PTT:27.6 sec  LIVER FUNCTIONS - ( 01 Apr 2025 08:39 )  Alb: 3.2 g/dL / Pro: 6.8 gm/dL / ALK PHOS: 76 U/L / ALT: 14 U/L / AST: 24 U/L / GGT: x             RADIOLOGY & ADDITIONAL STUDIES:

## 2025-04-01 NOTE — ED PROVIDER NOTE - HIV OFFER
Quality 431: Preventive Care And Screening: Unhealthy Alcohol Use - Screening: Patient not identified as an unhealthy alcohol user when screened for unhealthy alcohol use using a systematic screening method Detail Level: Detailed Quality 110: Preventive Care And Screening: Influenza Immunization: Influenza Immunization previously received during influenza season Quality 226: Preventive Care And Screening: Tobacco Use: Screening And Cessation Intervention: Patient screened for tobacco use and is an ex/non-smoker Quality 111:Pneumonia Vaccination Status For Older Adults: Patient did not receive any pneumococcal conjugate or polysaccharide vaccine on or after their 60th birthday and before the end of the measurement period Quality 134: Screening For Clinical Depression And Follow-Up Plan: The patient was screened for depression and the screen was negative and no follow up required Previously Declined (within the last year)

## 2025-04-01 NOTE — H&P ADULT - ASSESSMENT
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 PT states passage of blood is painless and scant with each bowel mov't. States she was seen at [facility] and transfused with PRBC for hgb of 8-9. She is symptomatic today and endorsing sob with only few steps. No chest pain or dizziness. Has chronic cough.   Hgb in Ed 7.6, last recorded in our database was 11.   Last dose of dapt yesterday.  Vitals stable.   Not hypoxic      #Symptomatic anemia  #Acute blood loss anemia  #GIB suspect lower gi source ie diverticulum  #COPD - stable  #Chronic R facial droop/Adeno CA of head and neck  - admit to medicine  - type and screen  - Pt with symptoms  - PRBC consent obtained and in chart  - 1 unit PRBC  - EKG: no dynamic changes  - No s/s or vital s/o active bleeding  - NPO a MN  - Tzimas: C scope tomorrow  - CLD for now  - repeat h/h later this evening  - resume DAPT after scope and clearance from GI asap  - LABA/CASANDRA  - SCDs for now        Full code  Time spent: >78 min spent on this encounter not including goc discussion  D/W gastroenterology team and EDprovider

## 2025-04-01 NOTE — CONSULT NOTE ADULT - ASSESSMENT
Imp: Anemia with melena    Rec:  ::EGD/ colonoscopy tomorrow  ::Continue trend HH, transfuse to maintain Hgb >7.0  ::Clear liquids today  ::Bowel prep today  ::CIPRIANO HOOPER Imp: Anemia with melena/hematochezia on DAPT    Rec:  ::EGD/ colonoscopy tomorrow  ::Continue trend HH, transfuse to maintain Hgb >7.0  ::Clear liquids today  ::Bowel prep today  ::CIPRIANO HOOPER Imp:Symptomatic anemia with melena/hematochezia on DAPT for the past few weeks    Rec:  ::EGD/ colonoscopy tomorrow  ::Continue trend HH, transfuse to maintain Hgb >7.0  ::Clear liquids today  ::Bowel prep today  ::CIPRIANO HOOPER

## 2025-04-01 NOTE — ED PROVIDER NOTE - PROGRESS NOTE DETAILS
Patient's hemoglobin 7.6, patient remains hemodynamically stable hold off on transfusion.  Spoke with GI NP Saadia who states patient is scheduled for EGD and colonoscopy tomorrow, clear liquid diet today with bowel prep.  Will admit to medicine.

## 2025-04-01 NOTE — H&P ADULT - NSHPLABSRESULTS_GEN_ALL_CORE
LABS: All Labs Reviewed:                        7.6    5.91  )-----------( 243      ( 01 Apr 2025 08:39 )             24.6     04-01    138  |  110[H]  |  23  ----------------------------<  98  4.2   |  24  |  0.80    Ca    8.9      01 Apr 2025 08:39  Mg     2.1     04-01    TPro  6.8  /  Alb  3.2[L]  /  TBili  0.3  /  DBili  x   /  AST  24  /  ALT  14  /  AlkPhos  76  04-01    PT/INR - ( 01 Apr 2025 08:39 )   PT: 12.1 sec;   INR: 1.03 ratio         PTT - ( 01 Apr 2025 08:39 )  PTT:27.6 sec          Blood Culture:

## 2025-04-01 NOTE — ED ADULT NURSE NOTE - EXTENSIONS OF SELF_ADULT
Addended by: KIMBERLEY WALDEN on: 5/27/2022 10:03 AM     Modules accepted: Orders     None Olanzapine Pregnancy And Lactation Text: This medication is pregnancy category C.   There are no adequate and well controlled trials with olanzapine in pregnant females.  Olanzapine should be used during pregnancy only if the potential benefit justifies the potential risk to the fetus.   In a study in lactating healthy women, olanzapine was excreted in breast milk.  It is recommended that women taking olanzapine should not breast feed.

## 2025-04-01 NOTE — ED ADULT NURSE NOTE - OBJECTIVE STATEMENT
Pt presents to [facility] complaining of rectal bleeding. Pt has hx of CVA and multiple cancers. Pt sent in by PMD after multiple episodes of rectal bleeding. Pt endorsing dizziness. Pt taken for stat EKG. Pt has prior L sided facial paralysis from CA treatment.  pt states she was dizzy all day yesterday.  pt had a blood transfusion approx 1 month ago for hemoglobin of 7.7 at [place].  pt states shes been rectally bleeding since February.

## 2025-04-01 NOTE — ED PROVIDER NOTE - CLINICAL SUMMARY MEDICAL DECISION MAKING FREE TEXT BOX
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  Patient was initially seen at [place] and required a unit of PRBCs at that time.   Patient hemodynamically stable with benign abdomen.  Concern for symptomatic anemia from GI bleeding on dual antiplatelet therapy.  Plan for labs, screening EKG, speak with GI.  Reassess

## 2025-04-01 NOTE — ED PROVIDER NOTE - NSICDXPASTSURGICALHX_GEN_ALL_CORE_FT
PAST SURGICAL HISTORY:  Arthropathy b/l knees (one in 2005 and other side in 2010)    Breast cancer 2002 right lumpectomy with post op chemotherapy and RT s/p lymph node dissection    Cancer [id number] inserted for chemotherapy in 2002 and then removed when chemotherapy completed    History of surgery left brow lift 7/2019    Mass of left parotid gland Excision of parotid mass with left neck dissection & graft 3/2019    Parotid mass diagnosed in 2007 with excision of left parotid ("adenoid cystic carcinoma") with post op RT    S/P tonsillectomy

## 2025-04-01 NOTE — ED ADULT TRIAGE NOTE - CHIEF COMPLAINT QUOTE
Pt presents to [facility] complaining of rectal bleeding. Pt has hx of CVA and multiple cancers. Pt sent in by PMD after multiple episodes of rectal bleeding. Pt endorsing dizziness. Pt taken for stat EKG. Pt has prior L sided facial paralysis from CA treatment.

## 2025-04-01 NOTE — ED PROVIDER NOTE - OBJECTIVE STATEMENT
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  Patient was initially seen at [place] and required a unit of PRBCs at that time.  Patient endorsing lightheadedness and fatigue but no chest pain or shortness of breath or abdominal pain or fever.

## 2025-04-01 NOTE — ED ADULT NURSE NOTE - CHIEF COMPLAINT QUOTE
Pt presents to [facility] complaining of rectal bleeding. Pt has hx of CVA and multiple cancers. Pt sent in by PMD after multiple episodes of rectal bleeding. Pt endorsing dizziness. Pt taken for stat EKG. Pt has prior L sided facial paralysis from CA treatment.

## 2025-04-02 LAB
ANION GAP SERPL CALC-SCNC: 6 MMOL/L — SIGNIFICANT CHANGE UP (ref 5–17)
BUN SERPL-MCNC: 15 MG/DL — SIGNIFICANT CHANGE UP (ref 7–23)
CALCIUM SERPL-MCNC: 8.9 MG/DL — SIGNIFICANT CHANGE UP (ref 8.5–10.1)
CHLORIDE SERPL-SCNC: 109 MMOL/L — HIGH (ref 96–108)
CO2 SERPL-SCNC: 26 MMOL/L — SIGNIFICANT CHANGE UP (ref 22–31)
CREAT SERPL-MCNC: 0.72 MG/DL — SIGNIFICANT CHANGE UP (ref 0.5–1.3)
EGFR: 89 ML/MIN/1.73M2 — SIGNIFICANT CHANGE UP
EGFR: 89 ML/MIN/1.73M2 — SIGNIFICANT CHANGE UP
GLUCOSE SERPL-MCNC: 84 MG/DL — SIGNIFICANT CHANGE UP (ref 70–99)
HCT VFR BLD CALC: 28.9 % — LOW (ref 34.5–45)
HGB BLD-MCNC: 9.2 G/DL — LOW (ref 11.5–15.5)
MCHC RBC-ENTMCNC: 28.7 PG — SIGNIFICANT CHANGE UP (ref 27–34)
MCHC RBC-ENTMCNC: 31.8 G/DL — LOW (ref 32–36)
MCV RBC AUTO: 90 FL — SIGNIFICANT CHANGE UP (ref 80–100)
NRBC # BLD AUTO: 0 K/UL — SIGNIFICANT CHANGE UP (ref 0–0)
NRBC # FLD: 0 K/UL — SIGNIFICANT CHANGE UP (ref 0–0)
NRBC BLD AUTO-RTO: 0 /100 WBCS — SIGNIFICANT CHANGE UP (ref 0–0)
PLATELET # BLD AUTO: 250 K/UL — SIGNIFICANT CHANGE UP (ref 150–400)
PMV BLD: 9.8 FL — SIGNIFICANT CHANGE UP (ref 7–13)
POTASSIUM SERPL-MCNC: 3.6 MMOL/L — SIGNIFICANT CHANGE UP (ref 3.5–5.3)
POTASSIUM SERPL-SCNC: 3.6 MMOL/L — SIGNIFICANT CHANGE UP (ref 3.5–5.3)
RBC # BLD: 3.21 M/UL — LOW (ref 3.8–5.2)
RBC # FLD: 13.7 % — SIGNIFICANT CHANGE UP (ref 10.3–14.5)
SODIUM SERPL-SCNC: 141 MMOL/L — SIGNIFICANT CHANGE UP (ref 135–145)
WBC # BLD: 5.74 K/UL — SIGNIFICANT CHANGE UP (ref 3.8–10.5)
WBC # FLD AUTO: 5.74 K/UL — SIGNIFICANT CHANGE UP (ref 3.8–10.5)

## 2025-04-02 PROCEDURE — 99233 SBSQ HOSP IP/OBS HIGH 50: CPT

## 2025-04-02 PROCEDURE — 99223 1ST HOSP IP/OBS HIGH 75: CPT

## 2025-04-02 PROCEDURE — 43255 EGD CONTROL BLEEDING ANY: CPT

## 2025-04-02 PROCEDURE — 45378 DIAGNOSTIC COLONOSCOPY: CPT

## 2025-04-02 PROCEDURE — 43239 EGD BIOPSY SINGLE/MULTIPLE: CPT

## 2025-04-02 RX ORDER — TIOTROPIUM BROMIDE INHALATION SPRAY 3.12 UG/1
2 SPRAY, METERED RESPIRATORY (INHALATION) DAILY
Refills: 0 | Status: DISCONTINUED | OUTPATIENT
Start: 2025-04-02 | End: 2025-04-02

## 2025-04-02 RX ORDER — FLUTICASONE FUROATE, UMECLIDINIUM BROMIDE AND VILANTEROL TRIFENATATE 100; 62.5; 25 UG/1; UG/1; UG/1
1 POWDER RESPIRATORY (INHALATION) DAILY
Refills: 0 | Status: DISCONTINUED | OUTPATIENT
Start: 2025-04-02 | End: 2025-04-03

## 2025-04-02 RX ORDER — ONDANSETRON HCL/PF 4 MG/2 ML
4 VIAL (ML) INJECTION ONCE
Refills: 0 | Status: DISCONTINUED | OUTPATIENT
Start: 2025-04-02 | End: 2025-04-02

## 2025-04-02 RX ORDER — SODIUM CHLORIDE 9 G/1000ML
1000 INJECTION, SOLUTION INTRAVENOUS
Refills: 0 | Status: DISCONTINUED | OUTPATIENT
Start: 2025-04-02 | End: 2025-04-02

## 2025-04-02 RX ORDER — FENTANYL CITRATE-0.9 % NACL/PF 100MCG/2ML
25 SYRINGE (ML) INTRAVENOUS
Refills: 0 | Status: DISCONTINUED | OUTPATIENT
Start: 2025-04-02 | End: 2025-04-02

## 2025-04-02 RX ORDER — OXYCODONE HYDROCHLORIDE 30 MG/1
5 TABLET ORAL ONCE
Refills: 0 | Status: DISCONTINUED | OUTPATIENT
Start: 2025-04-02 | End: 2025-04-02

## 2025-04-02 RX ADMIN — ATORVASTATIN CALCIUM 40 MILLIGRAM(S): 80 TABLET, FILM COATED ORAL at 21:34

## 2025-04-02 RX ADMIN — Medication 5 MILLIGRAM(S): at 16:47

## 2025-04-02 RX ADMIN — Medication 40 MILLIGRAM(S): at 21:34

## 2025-04-02 NOTE — DIETITIAN INITIAL EVALUATION ADULT - NS FNS DIET ORDER
Diet, NPO after Midnight:      NPO Start Date: 01-Apr-2025,   NPO Start Time: 23:59  Except Medications (04-01-25 @ 10:10) [Active]

## 2025-04-02 NOTE — CHART NOTE - NSCHARTNOTEFT_GEN_A_CORE
EGD Findings:   -Hiatal hernia  -Gastritis  -Gastric AVM s/p APC  -Duodenitis    Colonoscopy Findings:  -Diverticulosis  -Large hemorrhoids  -Brown stool throughout colon    Recommendations:  -Continue ASA  -Assess need to continue plavix  -Iron supplementation  -Ok to d/c from GI standpoint if Hb remains stable  -If continues to have rectal bleeding would recommend OP colorectal eval for hemorrhoids  -If remains off plavix, unlikely to need further GI workup

## 2025-04-02 NOTE — PROGRESS NOTE ADULT - NUTRITIONAL ASSESSMENT
This patient has been assessed with a concern for Malnutrition and has been determined to have a diagnosis/diagnoses of Moderate protein-calorie malnutrition.    This patient is being managed with:   Diet Regular-  Entered: Apr 2 2025  5:50PM    Diet NPO after Midnight-     NPO Start Date: 01-Apr-2025   NPO Start Time: 23:59  Except Medications  Entered: Apr 1 2025 10:10AM

## 2025-04-02 NOTE — CONSULT NOTE ADULT - ASSESSMENT
74-year-old female with h/o HTN, hypothyroidism, recent CVA  with no residual deficits in 1/2025 (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  Neurology called regarding DAPT and recent stroke with need to hold AC for endoscopy.      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  -F/U with Neurology outpatient-patient prefers [facility]   -please page Neurology for any further assistance-will sign off    D/W Dr. Styles, Dr. Hartman, patient

## 2025-04-02 NOTE — DIETITIAN INITIAL EVALUATION ADULT - ORAL INTAKE PTA/DIET HISTORY
Reports a poor appetite/ intake pta 2/2 having trouble chewing & swallowing from adenoid cystic sarcoma. Tries to eat 3 meals/ day and does not follow any diet restrictions. Does not consume any ONS, but has drank ensure in the past. Has trouble with meat and consumes softer foods. Per diet recall, pt consuming <75% of ENN for >/= 1 mon.   Reports a poor appetite/ intake pta 2/2 having trouble chewing from adenoid cystic sarcoma. Tries to eat 3 meals/ day and does not follow any diet restrictions. Does not consume any ONS, but has drank ensure in the past. Has trouble with meat and consumes softer foods. Per diet recall, pt consuming <75% of ENN for >/= 1 mon.

## 2025-04-02 NOTE — PROGRESS NOTE ADULT - TIME BILLING
Time spent  coordinating the patient's care. This includes reviewing documentation pertinent to this admission, results and imaging. Further tests, medications, and procedures have been ordered as indicated. Laboratory results and the plan of care were communicated to the patient. Discussed care plan with consultants including GI and neuro . Supporting documentation was completed and added to the patient's chart.

## 2025-04-02 NOTE — CONSULT NOTE ADULT - SUBJECTIVE AND OBJECTIVE BOX
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  Neurology called regarding DAPT and recent stroke with need to hold AC for endoscopy.        PAST MEDICAL & SURGICAL HISTORY:  COPD (chronic obstructive pulmonary disease)  controlled; intubated x1 about 8 years ago      Hypertension      Hypothyroid      Lung mass  2019      Anemia  during chemotherapy for right breast cancer in 2002      Breast cancer  diagnosed in 2002, had right lumpectomy (estrogen receptive positive, HER 2 positive 3) with post op chemotherapy and RT      Parotid mass  diagnosed in 2007 ("adenoid cystic carcinoma) of left parotid -- had excision with post op RT      Alcohol abuse  quit 30 years ago      Cancer  2019, recurrence of left parotid cancer, s/p facial surgery 3/2019      Anxiety      Dry eyes  left      Heartburn      Herpes zoster      Dysphagia      Breast cancer  2002 right lumpectomy with post op chemotherapy and RT s/p lymph node dissection      Parotid mass  diagnosed in 2007 with excision of left parotid ("adenoid cystic carcinoma") with post op RT      Arthropathy  b/l knees (one in 2005 and other side in 2010)      Cancer  Tzaez-w-gehc inserted for chemotherapy in 2002 and then removed when chemotherapy completed      Mass of left parotid gland  Excision of parotid mass with left neck dissection & graft 3/2019      S/P tonsillectomy      History of surgery  left brow lift 7/2019          FAMILY HISTORY:  Family history of heart disease (Father)        Social Hx: 50 pack years-quit 2017, past ETOH abuse-ETOH-quit >30 years ago.  Denies illicit drug use.    MEDICATIONS  (STANDING):  atorvastatin 40 milliGRAM(s) Oral at bedtime  cholecalciferol 1000 Unit(s) Oral daily  escitalopram 20 milliGRAM(s) Oral daily  ferrous    sulfate 325 milliGRAM(s) Oral daily  fluticasone propionate/ salmeterol 250-50 MICROgram(s) Diskus 1 Dose(s) Inhalation two times a day  influenza  Vaccine (HIGH DOSE) 0.5 milliLiter(s) IntraMuscular once  levothyroxine 50 MICROGram(s) Oral daily  losartan 50 milliGRAM(s) Oral daily  pantoprazole    Tablet 40 milliGRAM(s) Oral every 12 hours       Allergies  Augmentin (Rash)        ROS: Pertinent positives in HPI, all other ROS were reviewed and are negative.      Vital Signs Last 24 Hrs  T(C): 36.5 (01 Apr 2025 23:21), Max: 36.8 (01 Apr 2025 17:20)  T(F): 97.7 (01 Apr 2025 23:21), Max: 98.2 (01 Apr 2025 17:20)  HR: 71 (02 Apr 2025 11:06) (67 - 83)  BP: 138/69 (02 Apr 2025 11:06) (138/69 - 199/96)  BP(mean): 115 (01 Apr 2025 17:20) (74 - 115)  RR: 18 (02 Apr 2025 11:06) (18 - 18)  SpO2: 96% (02 Apr 2025 11:06) (92% - 100%)    Parameters below as of 02 Apr 2025 11:06  Patient On (Oxygen Delivery Method): room air      GEN:   Constitutional: awake, NAD  HEENT: PERRLA, EOMI,   Neck: Supple.  Extremities:  no edema  Musculoskeletal: no abnormal movements  Skin: No rashes    Neurological exam:  HF: A x O x 3. Appropriately interactive, normal affect. Speech fluent, No Aphasia or paraphasic errors. Naming /repetition intact   CN: SUSY, EOMI, VFF, L facial 5th/7th CN palsy, decreased facial sensation on the left, no NLFD, tongue midline.  mild dysarthria 2/2 facial palsy  Motor: No pronator drift, Strength 5/5 in all 4 ext, normal bulk and tone, no tremor, rigidity or bradykinesia.    Sens: Intact to light touch on extremities  Reflexes: Symmetric and normal, downgoing toes b/l  Coord:  No FNFA, dysmetria, SALAS intact   Gait/Balance: Cannot test          Labs:   04-02    141  |  109[H]  |  15  ----------------------------<  84  3.6   |  26  |  0.72    Ca    8.9      02 Apr 2025 08:58  Mg     2.1     04-01    TPro  6.8  /  Alb  3.2[L]  /  TBili  0.3  /  DBili  x   /  AST  24  /  ALT  14  /  AlkPhos  76  04-01                              9.2    5.74  )-----------( 250      ( 02 Apr 2025 08:58 )             28.9

## 2025-04-02 NOTE — DIETITIAN NUTRITION RISK NOTIFICATION - TREATMENT: THE FOLLOWING DIET HAS BEEN RECOMMENDED
Diet, Clear Liquid (04-01-25 @ 10:11) [Available for Activation]  Diet, NPO after Midnight:      NPO Start Date: 01-Apr-2025,   NPO Start Time: 23:59  Except Medications (04-01-25 @ 10:10) [Active]  Diet, Clear Liquid (04-01-25 @ 10:10) [Active]

## 2025-04-02 NOTE — DIETITIAN INITIAL EVALUATION ADULT - NSICDXPASTSURGICALHX_GEN_ALL_CORE_FT
PAST SURGICAL HISTORY:  Arthropathy b/l knees (one in 2005 and other side in 2010)    Breast cancer 2002 right lumpectomy with post op chemotherapy and RT s/p lymph node dissection    Cancer Lpsrf-a-fjoe inserted for chemotherapy in 2002 and then removed when chemotherapy completed    History of surgery left brow lift 7/2019    Mass of left parotid gland Excision of parotid mass with left neck dissection & graft 3/2019    Parotid mass diagnosed in 2007 with excision of left parotid ("adenoid cystic carcinoma") with post op RT    S/P tonsillectomy

## 2025-04-02 NOTE — DIETITIAN INITIAL EVALUATION ADULT - PERTINENT LABORATORY DATA
04-02    141  |  109[H]  |  15  ----------------------------<  84  3.6   |  26  |  0.72    Ca    8.9      02 Apr 2025 08:58  Mg     2.1     04-01    TPro  6.8  /  Alb  3.2[L]  /  TBili  0.3  /  DBili  x   /  AST  24  /  ALT  14  /  AlkPhos  76  04-01

## 2025-04-02 NOTE — DIETITIAN INITIAL EVALUATION ADULT - ADD RECOMMEND
1) Advance diet to Regular as soon as medically feasible  2) Trial milkshakes w/ meals when diet is advanced  3) consider checking B6, B12, thiamine, folate, carnitine, and copper levels as malnutrition in cause these to be deficient  4) Please obtain weekly weights  5) Consider adding thiamine 100 mg daily 2/2 poor PO intake/ malnutrition   6) MVI w/ minerals daily to ensure 100% RDA met   7) Encourage protein-rich foods, maximize food preferences  8) Monitor bowel movements, if no BM for >3 days, consider implementing bowel regimen.  9) Confirm goals of care regarding nutrition support  RD will continue to monitor PO intake, labs, hydration, and wt prn.

## 2025-04-02 NOTE — DIETITIAN INITIAL EVALUATION ADULT - OTHER INFO
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 PT states passage of blood is painless and scant with each bowel mov't. States she was seen at [facility] and transfused with PRBC for hgb of 8-9. She is symptomatic today and endorsing sob with only few steps. No chest pain or dizziness. Has chronic cough.   Hgb in Ed 7.6, last recorded in our database was 11.   Last dose of dapt yesterday.  Vitals stable.   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 Pt states passage of blood is painless and scant with each bowel mov't. States she was seen at [facility] and transfused with PRBC for hgb of 8-9. She is symptomatic today and endorsing sob with only few steps. Has chronic cough. Admitted for symptomatic anemia, ABLA, and GIB w/ suspected lower GI. GI following, plan for EDG/ Colonoscopy today.    Visited pt this morning, pt very pleasant. NPO at time of visit w/ plan for EGD and colonoscopy today. Pt frustrated as her colonoscopy was supposed to be this morning but was unrepentantly moved to 2PM and pt hungry. Endorses weight loss over the last year d/t adenoid cystic sarcoma s/p resection with left facial paralysis which has caused difficulty chewing. States that she has also lost her appetite w/ immunotherapy. Reports that her UBW was ~150-160# 1 yr ago and now weighs 133-135#. RD obtained bedscale wt on 4/2 - 132#. Weight hx reviewed: 158# (from [facility] on 4/22/24); weight loss of 26# (16.5%) x 11 mon - not clinsig. Pt thin appearing. NFPE reveals moderate to severe muscle/ fat wasting, pt meets criteria for PCM at this time. Recommend to advance diet to Regular as soon as medically feasible. Encouraged high kcal/ high prot intake, pt declined ONS at this time however would be receptive to milkshakes when diet is advanced if still admitted. Confirm goals of care regarding nutrition support. Please see additional recommendations below.

## 2025-04-02 NOTE — PROGRESS NOTE ADULT - SUBJECTIVE AND OBJECTIVE BOX
HOSPITALIST ATTENDING PROGRESS NOTE    Chart and meds reviewed.  Patient seen and examined.    CC: melena and hematochezia    Subjective: pt seen s/p prep for colonoscopy. clear stools; no acute events    All other systems reviewed and found to be negative with the exception of what has been described above.    MEDICATIONS  (STANDING):  atorvastatin 40 milliGRAM(s) Oral at bedtime  cholecalciferol 1000 Unit(s) Oral daily  escitalopram 20 milliGRAM(s) Oral daily  ferrous    sulfate 325 milliGRAM(s) Oral daily  fluticasone furoate/umeclidinium/vilanterol 200-62.5-25 MICROgram(s) Inhaler 1 Puff(s) Inhalation daily  influenza  Vaccine (HIGH DOSE) 0.5 milliLiter(s) IntraMuscular once  levothyroxine 50 MICROGram(s) Oral daily  losartan 50 milliGRAM(s) Oral daily  pantoprazole    Tablet 40 milliGRAM(s) Oral every 12 hours    MEDICATIONS  (PRN):  acetaminophen     Tablet .. 650 milliGRAM(s) Oral every 6 hours PRN Temp greater or equal to 38C (100.4F), Mild Pain (1 - 3)  albuterol    90 MICROgram(s) HFA Inhaler 2 Puff(s) Inhalation every 6 hours PRN Bronchospasm  aluminum hydroxide/magnesium hydroxide/simethicone Suspension 30 milliLiter(s) Oral every 4 hours PRN Dyspepsia  melatonin 3 milliGRAM(s) Oral at bedtime PRN Insomnia  ondansetron Injectable 4 milliGRAM(s) IV Push every 8 hours PRN Nausea and/or Vomiting      PHYSICAL EXAM:  Gen: NAD  CV:  +S1, +S2, regular, no murmurs or rubs  RESP:   lungs clear to auscultation bilaterally, no wheezing, rales, rhonchi, good air entry bilaterally  GI:  abdomen soft, non-tender, non-distended, normal BS, no bruits, no abdominal masses, no palpable masses  :  not examined  MSK:   normal muscle tone, no atrophy, no rigidity, no contractions  EXT:  no edema, no calf pain, swelling or erythema  NEURO:  AAOX3, left facial paralysis 2/2 remote RT, follows all commands, able to move extremities spontaneously    LABS:                            9.2    5.74  )-----------( 250      ( 02 Apr 2025 08:58 )             28.9     04-02    141  |  109[H]  |  15  ----------------------------<  84  3.6   |  26  |  0.72    Ca    8.9      02 Apr 2025 08:58  Mg     2.1     04-01    TPro  6.8  /  Alb  3.2[L]  /  TBili  0.3  /  DBili  x   /  AST  24  /  ALT  14  /  AlkPhos  76  04-01        LIVER FUNCTIONS - ( 01 Apr 2025 08:39 )  Alb: 3.2 g/dL / Pro: 6.8 gm/dL / ALK PHOS: 76 U/L / ALT: 14 U/L / AST: 24 U/L / GGT: x           PT/INR - ( 01 Apr 2025 08:39 )   PT: 12.1 sec;   INR: 1.03 ratio         PTT - ( 01 Apr 2025 08:39 )  PTT:27.6 sec  Urinalysis Basic - ( 02 Apr 2025 08:58 )    Color: x / Appearance: x / SG: x / pH: x  Gluc: 84 mg/dL / Ketone: x  / Bili: x / Urobili: x   Blood: x / Protein: x / Nitrite: x   Leuk Esterase: x / RBC: x / WBC x   Sq Epi: x / Non Sq Epi: x / Bacteria: x             Xray Knee 3 Views, Right

## 2025-04-02 NOTE — DIETITIAN INITIAL EVALUATION ADULT - PERTINENT MEDS FT
MEDICATIONS  (STANDING):  atorvastatin 40 milliGRAM(s) Oral at bedtime  cholecalciferol 1000 Unit(s) Oral daily  escitalopram 20 milliGRAM(s) Oral daily  ferrous    sulfate 325 milliGRAM(s) Oral daily  fluticasone propionate/ salmeterol 250-50 MICROgram(s) Diskus 1 Dose(s) Inhalation two times a day  influenza  Vaccine (HIGH DOSE) 0.5 milliLiter(s) IntraMuscular once  levothyroxine 50 MICROGram(s) Oral daily  losartan 50 milliGRAM(s) Oral daily  pantoprazole    Tablet 40 milliGRAM(s) Oral every 12 hours    MEDICATIONS  (PRN):  acetaminophen     Tablet .. 650 milliGRAM(s) Oral every 6 hours PRN Temp greater or equal to 38C (100.4F), Mild Pain (1 - 3)  albuterol    90 MICROgram(s) HFA Inhaler 2 Puff(s) Inhalation every 6 hours PRN Bronchospasm  aluminum hydroxide/magnesium hydroxide/simethicone Suspension 30 milliLiter(s) Oral every 4 hours PRN Dyspepsia  melatonin 3 milliGRAM(s) Oral at bedtime PRN Insomnia  ondansetron Injectable 4 milliGRAM(s) IV Push every 8 hours PRN Nausea and/or Vomiting    Home Medications:  aspirin 81 mg oral delayed release tablet: 1 tab(s) orally once a day (01 Apr 2025 11:38)  atorvastatin 40 mg oral tablet: 1 tab(s) orally once a day (01 Apr 2025 11:37)  clopidogrel 75 mg oral tablet: 1 tab(s) orally once a day (01 Apr 2025 11:38)  ferrous sulfate 325 mg (65 mg elemental iron) oral tablet: 1 tab(s) orally once a day (01 Apr 2025 11:37)  levothyroxine 50 mcg (0.05 mg) oral tablet: 1 tab(s) orally once a day, am (01 Apr 2025 11:34)  Lexapro 20 mg oral tablet: 1 tab(s) orally once a day, am (01 Apr 2025 11:34)  olmesartan 20 mg oral tablet: 1 tab(s) orally once a day, am (01 Apr 2025 11:34)  pantoprazole 40 mg oral delayed release tablet: 1 tab(s) orally 2 times a day (01 Apr 2025 11:38)  Trelegy Ellipta 200 mcg-62.5 mcg-25 mcg/inh inhalation powder: 1 puff(s) inhaled once a day (01 Apr 2025 11:38)  Vitamin D3 25 mcg (1000 intl units) oral tablet: 1 tab(s) orally once a day (01 Apr 2025 11:38)

## 2025-04-02 NOTE — DIETITIAN INITIAL EVALUATION ADULT - NSFNSGIIOFT_GEN_A_CORE
I&O's Detail    01 Apr 2025 07:01  -  02 Apr 2025 07:00  --------------------------------------------------------  IN:    Oral Fluid: 800 mL  Total IN: 800 mL    OUT:  Total OUT: 0 mL    Total NET: 800 mL

## 2025-04-02 NOTE — CONSULT NOTE ADULT - NS ATTEND AMEND GEN_ALL_CORE FT
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  Agree with above. D/C Plavix, once okay with GI, continue low dose asa.
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 Admitted for symptomatic anemia. Consulted for GI bleed.    Needs endoscopic evaluation as recurrent symptomatic anemia, need for transfusion.   Will prep for egd and colonoscopy.

## 2025-04-02 NOTE — DIETITIAN INITIAL EVALUATION ADULT - ETIOLOGY
r/t decreased ability to meet increased nutrient needs 2/2 adenoid cystic sarcoma, trouble chewing, poor appetite

## 2025-04-02 NOTE — PROGRESS NOTE ADULT - ASSESSMENT
#acute blood loss anemia 2/2 hematochezia from LGIB - internal hemorrhoids and melena 2/2 UGIB - AVM in stomach  #esophagitis   #duodenitis  #AVM stomach s/p APC  diverticulosis without diverticular bleed or diverticulitis   s/p 1 unit of blood on admission   per neuro no longer needs DAPT - CVA was in january >21 days ago  will resume aspirin tomorrow if hgb is stable     #hx of cva   seen by neuro   resume single antiplatelt - aspirin only upon dc    #copd   stable     #left facial paralysis 2/2 rt related to adeno ca of head and neck  at baseline    SCDs  dispo: dc tomorrow if hgb stable    discussed with dtr at bedside

## 2025-04-03 ENCOUNTER — TRANSCRIPTION ENCOUNTER (OUTPATIENT)
Age: 75
End: 2025-04-03

## 2025-04-03 VITALS
HEART RATE: 75 BPM | RESPIRATION RATE: 19 BRPM | SYSTOLIC BLOOD PRESSURE: 143 MMHG | OXYGEN SATURATION: 98 % | DIASTOLIC BLOOD PRESSURE: 71 MMHG | TEMPERATURE: 98 F

## 2025-04-03 LAB
ANION GAP SERPL CALC-SCNC: 3 MMOL/L — LOW (ref 5–17)
BUN SERPL-MCNC: 16 MG/DL — SIGNIFICANT CHANGE UP (ref 7–23)
CALCIUM SERPL-MCNC: 9.1 MG/DL — SIGNIFICANT CHANGE UP (ref 8.5–10.1)
CHLORIDE SERPL-SCNC: 110 MMOL/L — HIGH (ref 96–108)
CO2 SERPL-SCNC: 26 MMOL/L — SIGNIFICANT CHANGE UP (ref 22–31)
CREAT SERPL-MCNC: 0.77 MG/DL — SIGNIFICANT CHANGE UP (ref 0.5–1.3)
EGFR: 82 ML/MIN/1.73M2 — SIGNIFICANT CHANGE UP
EGFR: 82 ML/MIN/1.73M2 — SIGNIFICANT CHANGE UP
GLUCOSE SERPL-MCNC: 106 MG/DL — HIGH (ref 70–99)
HCT VFR BLD CALC: 29.1 % — LOW (ref 34.5–45)
HGB BLD-MCNC: 9.1 G/DL — LOW (ref 11.5–15.5)
MCHC RBC-ENTMCNC: 28.6 PG — SIGNIFICANT CHANGE UP (ref 27–34)
MCHC RBC-ENTMCNC: 31.3 G/DL — LOW (ref 32–36)
MCV RBC AUTO: 91.5 FL — SIGNIFICANT CHANGE UP (ref 80–100)
NRBC # BLD AUTO: 0 K/UL — SIGNIFICANT CHANGE UP (ref 0–0)
NRBC # FLD: 0 K/UL — SIGNIFICANT CHANGE UP (ref 0–0)
NRBC BLD AUTO-RTO: 0 /100 WBCS — SIGNIFICANT CHANGE UP (ref 0–0)
PLATELET # BLD AUTO: 257 K/UL — SIGNIFICANT CHANGE UP (ref 150–400)
PMV BLD: 9.7 FL — SIGNIFICANT CHANGE UP (ref 7–13)
POTASSIUM SERPL-MCNC: 3.8 MMOL/L — SIGNIFICANT CHANGE UP (ref 3.5–5.3)
POTASSIUM SERPL-SCNC: 3.8 MMOL/L — SIGNIFICANT CHANGE UP (ref 3.5–5.3)
RBC # BLD: 3.18 M/UL — LOW (ref 3.8–5.2)
RBC # FLD: 13.8 % — SIGNIFICANT CHANGE UP (ref 10.3–14.5)
SODIUM SERPL-SCNC: 139 MMOL/L — SIGNIFICANT CHANGE UP (ref 135–145)
WBC # BLD: 5.17 K/UL — SIGNIFICANT CHANGE UP (ref 3.8–10.5)
WBC # FLD AUTO: 5.17 K/UL — SIGNIFICANT CHANGE UP (ref 3.8–10.5)

## 2025-04-03 PROCEDURE — 99239 HOSP IP/OBS DSCHRG MGMT >30: CPT

## 2025-04-03 RX ORDER — ASPIRIN 325 MG
81 TABLET ORAL DAILY
Refills: 0 | Status: DISCONTINUED | OUTPATIENT
Start: 2025-04-03 | End: 2025-04-03

## 2025-04-03 RX ORDER — CLOPIDOGREL BISULFATE 75 MG/1
1 TABLET, FILM COATED ORAL
Refills: 0 | DISCHARGE

## 2025-04-03 RX ADMIN — Medication 325 MILLIGRAM(S): at 09:23

## 2025-04-03 RX ADMIN — LOSARTAN POTASSIUM 50 MILLIGRAM(S): 100 TABLET, FILM COATED ORAL at 09:23

## 2025-04-03 RX ADMIN — Medication 50 MICROGRAM(S): at 05:59

## 2025-04-03 RX ADMIN — ESCITALOPRAM OXALATE 20 MILLIGRAM(S): 20 TABLET ORAL at 09:22

## 2025-04-03 RX ADMIN — Medication 1000 UNIT(S): at 09:23

## 2025-04-03 RX ADMIN — Medication 40 MILLIGRAM(S): at 09:23

## 2025-04-03 RX ADMIN — FLUTICASONE FUROATE, UMECLIDINIUM BROMIDE AND VILANTEROL TRIFENATATE 1 PUFF(S): 100; 62.5; 25 POWDER RESPIRATORY (INHALATION) at 08:25

## 2025-04-03 RX ADMIN — Medication 81 MILLIGRAM(S): at 10:36

## 2025-04-03 NOTE — DISCHARGE NOTE PROVIDER - PROVIDER RX CONTACT NUMBER
Left message for patient to called back to verify if she has seen a Cardiologist in the past
Patient has not seen a Cardiology in the past
[phone]

## 2025-04-03 NOTE — DISCHARGE NOTE NURSING/CASE MANAGEMENT/SOCIAL WORK - NSDCPEFALRISK_GEN_ALL_CORE
For information on Fall & Injury Prevention, visit: [email or web address] OR  [email or web address] OR  https://www.cdc.gov/steadi/patient.html

## 2025-04-03 NOTE — DISCHARGE NOTE PROVIDER - DETAILS OF MALNUTRITION DIAGNOSIS/DIAGNOSES
This patient has been assessed with a concern for Malnutrition and was treated during this hospitalization for the following Nutrition diagnosis/diagnoses:     -  04/02/2025: Moderate protein-calorie malnutrition

## 2025-04-03 NOTE — DISCHARGE NOTE PROVIDER - NSDCMRMEDTOKEN_GEN_ALL_CORE_FT
aspirin 81 mg oral delayed release tablet: 1 tab(s) orally once a day  atorvastatin 40 mg oral tablet: 1 tab(s) orally once a day  ferrous sulfate 325 mg (65 mg elemental iron) oral tablet: 1 tab(s) orally once a day  levothyroxine 50 mcg (0.05 mg) oral tablet: 1 tab(s) orally once a day, am  Lexapro 20 mg oral tablet: 1 tab(s) orally once a day, am  olmesartan 20 mg oral tablet: 1 tab(s) orally once a day, am  pantoprazole 40 mg oral delayed release tablet: 1 tab(s) orally 2 times a day  Trelegy Ellipta 200 mcg-62.5 mcg-25 mcg/inh inhalation powder: 1 puff(s) inhaled once a day  Vitamin D3 25 mcg (1000 intl units) oral tablet: 1 tab(s) orally once a day

## 2025-04-03 NOTE — DISCHARGE NOTE PROVIDER - CARE PROVIDER_API CALL
Lani Mcdowell  Gastroenterology  [address], [place] [address]  Phone: [phone]  Fax: [phone]  Follow Up Time: 1 week    Reyes, JohnEdmund[address] [address]  Phone: [phone]  Fax: [phone]  Follow Up Time: 1 week

## 2025-04-03 NOTE — DISCHARGE NOTE PROVIDER - HOSPITAL COURSE
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 PT states passage of blood is painless and scant with each bowel mov't. States she was seen at [facility] and transfused with PRBC for hgb of 8-9. She is symptomatic today and endorsing sob with only few steps. No chest pain or dizziness. Has chronic cough.   Hgb in Ed 7.6, last recorded in our database was 11.   Last dose of dapt yesterday.  Vitals stable.   Not hypoxic"    Hospital Course: pt admitted for acute blood loss anemia 2/2 GIB. received 1 unit prbc. 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 per GI ok to resume antiplatelet. colonoscopy with large internal hemorrhoids pt came on DAPT despite CVA over 3 months ago. per neuro here, switch to single antiplatelet - continue only aspirin. hgb stable upon dc. no active bleeding upon dc.     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

## 2025-04-03 NOTE — DISCHARGE NOTE PROVIDER - NSDCCAREPROVSEEN_GEN_ALL_CORE_FT
Allyson, Ester Styles, Karthik Donald, William Hartman, Hunter Russell, Maira Arreaga, Essence Mcdowell, Lani Burk, Evangelina Talbert, Kamila NGUYEN

## 2025-04-03 NOTE — DISCHARGE NOTE NURSING/CASE MANAGEMENT/SOCIAL WORK - FINANCIAL ASSISTANCE
[facility] provides services at a reduced cost to those who are determined to be eligible through [facility]’s financial assistance program. Information regarding [facility]’s financial assistance program can be found by going to [email or web address] or by calling [phone].

## 2025-04-03 NOTE — DISCHARGE NOTE PROVIDER - NSDCCPCAREPLAN_GEN_ALL_CORE_FT
PRINCIPAL DISCHARGE DIAGNOSIS  Diagnosis: AVM (arteriovenous malformation) of stomach, acquired with hemorrhage  Assessment and Plan of Treatment: you were admitted for GI bleeding. on EGD you were found to have arterio-venous malformation in your stomach. in combination with aspirin, plavix, and motrin, the AVM bled causing anemia. you received 1 unit of blood. for the AVM the GI dr performed Argon Plasma Coagulation (APC) during the EGD. your hemoglobin was stable the day after the EGD. you are cleared to resume ASPIRIN ONLY. no other blood thinners are required. NO plavix. NO motrin. NO advil. please follow up with your PCP in 1 week for a repeat CBC.      SECONDARY DISCHARGE DIAGNOSES  Diagnosis: Duodenitis  Assessment and Plan of Treatment: there was mild inflammation in your duodenum (small intestine) and esphagus. please continue pantoprazole 40mg twice daily for 12 weeks then switch to once daily     PRINCIPAL DISCHARGE DIAGNOSIS  Diagnosis: AVM (arteriovenous malformation) of stomach, acquired with hemorrhage  Assessment and Plan of Treatment: you were admitted for GI bleeding. on EGD you were found to have arterio-venous malformation in your stomach. in combination with aspirin, plavix, and motrin, the AVM bled causing anemia. you received 1 unit of blood. for the AVM the GI dr performed Argon Plasma Coagulation (APC) during the EGD. your hemoglobin was stable the day after the EGD. you are cleared to resume ASPIRIN ONLY. no other blood thinners are required. NO plavix. NO motrin. NO advil. please follow up with your PCP in 1 week for a repeat CBC.      SECONDARY DISCHARGE DIAGNOSES  Diagnosis: Duodenitis  Assessment and Plan of Treatment: there was mild inflammation in your duodenum (small intestine) and esphagus. please continue pantoprazole 40mg twice daily for 12 weeks then switch to once daily    Diagnosis: Internal hemorrhoid  Assessment and Plan of Treatment: you were found to have large internal hemorrhoids which explained the bright red blood per rectum. if you want the hemrrhoids surgically removed you will need to follow up with a colorectal surgeon.    Diagnosis: Diverticulosis  Assessment and Plan of Treatment: on colonoscopy you were found to have outpouchings of your colon. there was no inflammation or infection. please follow a high fiber diet

## 2025-04-03 NOTE — DISCHARGE NOTE NURSING/CASE MANAGEMENT/SOCIAL WORK - PATIENT PORTAL LINK FT
You can access the FollowMyHealth Patient Portal offered by [facility] by registering at the following website: [email or web address]. By joining [employer]’s FollowMyHealth portal, you will also be able to view your health information using other applications (apps) compatible with our system.

## 2025-04-03 NOTE — DISCHARGE NOTE PROVIDER - NSDCPNSUBOBJ_GEN_ALL_CORE
HOSPITALIST ATTENDING PROGRESS NOTE    Chart and meds reviewed.  Patient seen and examined.    CC: gib     Subjective: no episodes of melena or hematochezia since egd. hgb stable this morning.     All other systems reviewed and found to be negative with the exception of what has been described above.    MEDICATIONS  (STANDING):  aspirin  chewable 81 milliGRAM(s) Oral daily  atorvastatin 40 milliGRAM(s) Oral at bedtime  cholecalciferol 1000 Unit(s) Oral daily  escitalopram 20 milliGRAM(s) Oral daily  ferrous    sulfate 325 milliGRAM(s) Oral daily  fluticasone furoate/umeclidinium/vilanterol 200-62.5-25 MICROgram(s) Inhaler 1 Puff(s) Inhalation daily  influenza  Vaccine (HIGH DOSE) 0.5 milliLiter(s) IntraMuscular once  levothyroxine 50 MICROGram(s) Oral daily  losartan 50 milliGRAM(s) Oral daily  pantoprazole    Tablet 40 milliGRAM(s) Oral every 12 hours    MEDICATIONS  (PRN):  acetaminophen     Tablet .. 650 milliGRAM(s) Oral every 6 hours PRN Temp greater or equal to 38C (100.4F), Mild Pain (1 - 3)  albuterol    90 MICROgram(s) HFA Inhaler 2 Puff(s) Inhalation every 6 hours PRN Bronchospasm  aluminum hydroxide/magnesium hydroxide/simethicone Suspension 30 milliLiter(s) Oral every 4 hours PRN Dyspepsia  melatonin 3 milliGRAM(s) Oral at bedtime PRN Insomnia  ondansetron Injectable 4 milliGRAM(s) IV Push every 8 hours PRN Nausea and/or Vomiting      PHYSICAL EXAM:  Gen: NAD  CV:  +S1, +S2, regular, no murmurs or rubs  RESP:   lungs clear to auscultation bilaterally, no wheezing, rales, rhonchi, good air entry bilaterally  GI:  abdomen soft, non-tender, non-distended, normal BS, no bruits, no abdominal masses, no palpable masses  :  not examined  MSK:   normal muscle tone, no atrophy, no rigidity, no contractions  EXT:  no edema, no calf pain, swelling or erythema  NEURO:  AAOX3, left facial paralysis 2/2 remote RT, follows all commands, able to move extremities spontaneously    LABS:                            9.1    5.17  )-----------( 257      ( 03 Apr 2025 06:54 )             29.1     04-03    139  |  110[H]  |  16  ----------------------------<  106[H]  3.8   |  26  |  0.77    Ca    9.1      03 Apr 2025 06:54              Urinalysis Basic - ( 03 Apr 2025 06:54 )    Color: x / Appearance: x / SG: x / pH: x  Gluc: 106 mg/dL / Ketone: x  / Bili: x / Urobili: x   Blood: x / Protein: x / Nitrite: x   Leuk Esterase: x / RBC: x / WBC x   Sq Epi: x / Non Sq Epi: x / Bacteria: x

## 2025-04-03 NOTE — DISCHARGE NOTE PROVIDER - PROVIDER TOKENS
PROVIDER:[TOKEN:[[id number]],FOLLOWUP:[1 week]],PROVIDER:[TOKEN:[[id number]],FOLLOWUP:[1 week]]

## 2025-04-04 ENCOUNTER — NON-APPOINTMENT (OUTPATIENT)
Age: 75
End: 2025-04-04

## 2025-04-09 DIAGNOSIS — K29.91 GASTRODUODENITIS, UNSPECIFIED, WITH BLEEDING: ICD-10-CM

## 2025-04-09 DIAGNOSIS — F10.21 ALCOHOL DEPENDENCE, IN REMISSION: ICD-10-CM

## 2025-04-09 DIAGNOSIS — K62.5 HEMORRHAGE OF ANUS AND RECTUM: ICD-10-CM

## 2025-04-09 DIAGNOSIS — K44.9 DIAPHRAGMATIC HERNIA WITHOUT OBSTRUCTION OR GANGRENE: ICD-10-CM

## 2025-04-09 DIAGNOSIS — K64.8 OTHER HEMORRHOIDS: ICD-10-CM

## 2025-04-09 DIAGNOSIS — Z17.0 ESTROGEN RECEPTOR POSITIVE STATUS [ER+]: ICD-10-CM

## 2025-04-09 DIAGNOSIS — Z85.89 PERSONAL HISTORY OF MALIGNANT NEOPLASM OF OTHER ORGANS AND SYSTEMS: ICD-10-CM

## 2025-04-09 DIAGNOSIS — K57.30 DIVERTICULOSIS OF LARGE INTESTINE WITHOUT PERFORATION OR ABSCESS WITHOUT BLEEDING: ICD-10-CM

## 2025-04-09 DIAGNOSIS — D62 ACUTE POSTHEMORRHAGIC ANEMIA: ICD-10-CM

## 2025-04-09 DIAGNOSIS — G97.49 ACCIDENTAL PUNCTURE AND LACERATION OF OTHER NERVOUS SYSTEM ORGAN OR STRUCTURE DURING OTHER PROCEDURE: ICD-10-CM

## 2025-04-09 DIAGNOSIS — Z86.73 PERSONAL HISTORY OF TRANSIENT ISCHEMIC ATTACK (TIA), AND CEREBRAL INFARCTION WITHOUT RESIDUAL DEFICITS: ICD-10-CM

## 2025-04-09 DIAGNOSIS — Z86.19 PERSONAL HISTORY OF OTHER INFECTIOUS AND PARASITIC DISEASES: ICD-10-CM

## 2025-04-09 DIAGNOSIS — K31.811 ANGIODYSPLASIA OF STOMACH AND DUODENUM WITH BLEEDING: ICD-10-CM

## 2025-04-09 DIAGNOSIS — C50.911 MALIGNANT NEOPLASM OF UNSPECIFIED SITE OF RIGHT FEMALE BREAST: ICD-10-CM

## 2025-04-09 DIAGNOSIS — Z92.21 PERSONAL HISTORY OF ANTINEOPLASTIC CHEMOTHERAPY: ICD-10-CM

## 2025-04-09 DIAGNOSIS — Z92.3 PERSONAL HISTORY OF IRRADIATION: ICD-10-CM

## 2025-04-09 DIAGNOSIS — Z88.1 ALLERGY STATUS TO OTHER ANTIBIOTIC AGENTS: ICD-10-CM

## 2025-04-09 DIAGNOSIS — Z79.82 LONG TERM (CURRENT) USE OF ASPIRIN: ICD-10-CM

## 2025-04-09 DIAGNOSIS — E03.9 HYPOTHYROIDISM, UNSPECIFIED: ICD-10-CM

## 2025-04-09 DIAGNOSIS — E78.5 HYPERLIPIDEMIA, UNSPECIFIED: ICD-10-CM

## 2025-04-09 DIAGNOSIS — I10 ESSENTIAL (PRIMARY) HYPERTENSION: ICD-10-CM

## 2025-04-09 DIAGNOSIS — E44.0 MODERATE PROTEIN-CALORIE MALNUTRITION: ICD-10-CM

## 2025-04-09 DIAGNOSIS — J44.9 CHRONIC OBSTRUCTIVE PULMONARY DISEASE, UNSPECIFIED: ICD-10-CM

## 2025-04-14 ENCOUNTER — APPOINTMENT (OUTPATIENT)
Dept: FAMILY MEDICINE | Facility: CLINIC | Age: 75
End: 2025-04-14
Payer: MEDICARE

## 2025-04-14 VITALS
BODY MASS INDEX: 24.76 KG/M2 | WEIGHT: 138 LBS | OXYGEN SATURATION: 96 % | TEMPERATURE: 98.2 F | HEART RATE: 70 BPM | SYSTOLIC BLOOD PRESSURE: 120 MMHG | HEIGHT: 62.5 IN | DIASTOLIC BLOOD PRESSURE: 82 MMHG

## 2025-04-14 DIAGNOSIS — I63.9 CEREBRAL INFARCTION, UNSPECIFIED: ICD-10-CM

## 2025-04-14 DIAGNOSIS — K92.2 GASTROINTESTINAL HEMORRHAGE, UNSPECIFIED: ICD-10-CM

## 2025-04-14 PROCEDURE — 99495 TRANSJ CARE MGMT MOD F2F 14D: CPT

## 2025-04-16 ENCOUNTER — APPOINTMENT (OUTPATIENT)
Dept: ORTHOPEDIC SURGERY | Facility: CLINIC | Age: 75
End: 2025-04-16
Payer: MEDICARE

## 2025-04-16 VITALS — WEIGHT: 138 LBS | BODY MASS INDEX: 24.76 KG/M2 | HEIGHT: 62.5 IN

## 2025-04-16 DIAGNOSIS — M70.61 TROCHANTERIC BURSITIS, RIGHT HIP: ICD-10-CM

## 2025-04-16 DIAGNOSIS — M16.11 UNILATERAL PRIMARY OSTEOARTHRITIS, RIGHT HIP: ICD-10-CM

## 2025-04-16 PROCEDURE — 73502 X-RAY EXAM HIP UNI 2-3 VIEWS: CPT

## 2025-04-16 PROCEDURE — 20610 DRAIN/INJ JOINT/BURSA W/O US: CPT | Mod: RT

## 2025-04-16 PROCEDURE — 99214 OFFICE O/P EST MOD 30 MIN: CPT | Mod: 25

## 2025-04-17 LAB
BASOPHILS # BLD AUTO: 0.06 K/UL
BASOPHILS NFR BLD AUTO: 0.9 %
EOSINOPHIL # BLD AUTO: 0.48 K/UL
EOSINOPHIL NFR BLD AUTO: 6.9 %
FERRITIN SERPL-MCNC: 36 NG/ML
HCT VFR BLD CALC: 30.6 %
HGB BLD-MCNC: 9.6 G/DL
IMM GRANULOCYTES NFR BLD AUTO: 0.4 %
IRON SATN MFR SERPL: 21 %
IRON SERPL-MCNC: 69 UG/DL
LYMPHOCYTES # BLD AUTO: 1.07 K/UL
LYMPHOCYTES NFR BLD AUTO: 15.3 %
MAN DIFF?: NORMAL
MCHC RBC-ENTMCNC: 29.4 PG
MCHC RBC-ENTMCNC: 31.4 G/DL
MCV RBC AUTO: 93.9 FL
MONOCYTES # BLD AUTO: 0.69 K/UL
MONOCYTES NFR BLD AUTO: 9.9 %
NEUTROPHILS # BLD AUTO: 4.67 K/UL
NEUTROPHILS NFR BLD AUTO: 66.6 %
PLATELET # BLD AUTO: 286 K/UL
RBC # BLD: 3.26 M/UL
RBC # FLD: 12.9 %
TIBC SERPL-MCNC: 324 UG/DL
UIBC SERPL-MCNC: 256 UG/DL
WBC # FLD AUTO: 7 K/UL

## 2025-04-23 ENCOUNTER — APPOINTMENT (OUTPATIENT)
Dept: FAMILY MEDICINE | Facility: CLINIC | Age: 75
End: 2025-04-23
Payer: MEDICARE

## 2025-04-23 VITALS
HEIGHT: 62.5 IN | HEART RATE: 83 BPM | WEIGHT: 134 LBS | OXYGEN SATURATION: 97 % | DIASTOLIC BLOOD PRESSURE: 82 MMHG | BODY MASS INDEX: 24.04 KG/M2 | TEMPERATURE: 97.6 F | SYSTOLIC BLOOD PRESSURE: 126 MMHG

## 2025-04-23 DIAGNOSIS — J01.90 ACUTE SINUSITIS, UNSPECIFIED: ICD-10-CM

## 2025-04-23 DIAGNOSIS — J44.9 CHRONIC OBSTRUCTIVE PULMONARY DISEASE, UNSPECIFIED: ICD-10-CM

## 2025-04-23 DIAGNOSIS — D64.9 ANEMIA, UNSPECIFIED: ICD-10-CM

## 2025-04-23 DIAGNOSIS — C07 MALIGNANT NEOPLASM OF PAROTID GLAND: ICD-10-CM

## 2025-04-23 PROCEDURE — 99214 OFFICE O/P EST MOD 30 MIN: CPT

## 2025-05-01 LAB
ALBUMIN SERPL ELPH-MCNC: 4.2 G/DL
ALP BLD-CCNC: 82 U/L
ALT SERPL-CCNC: 9 U/L
ANION GAP SERPL CALC-SCNC: 15 MMOL/L
AST SERPL-CCNC: 17 U/L
BASOPHILS # BLD AUTO: 0.08 K/UL
BASOPHILS NFR BLD AUTO: 1.4 %
BILIRUB SERPL-MCNC: 0.2 MG/DL
BUN SERPL-MCNC: 28 MG/DL
CALCIUM SERPL-MCNC: 9.7 MG/DL
CHLORIDE SERPL-SCNC: 104 MMOL/L
CO2 SERPL-SCNC: 21 MMOL/L
CREAT SERPL-MCNC: 0.8 MG/DL
EGFRCR SERPLBLD CKD-EPI 2021: 78 ML/MIN/1.73M2
EOSINOPHIL # BLD AUTO: 0.28 K/UL
EOSINOPHIL NFR BLD AUTO: 4.8 %
FERRITIN SERPL-MCNC: 32 NG/ML
GLUCOSE SERPL-MCNC: 91 MG/DL
HCT VFR BLD CALC: 33.8 %
HGB BLD-MCNC: 10.2 G/DL
IMM GRANULOCYTES NFR BLD AUTO: 0.2 %
IRON SATN MFR SERPL: 66 %
IRON SERPL-MCNC: 247 UG/DL
LYMPHOCYTES # BLD AUTO: 0.84 K/UL
LYMPHOCYTES NFR BLD AUTO: 14.3 %
MAN DIFF?: NORMAL
MCHC RBC-ENTMCNC: 28.4 PG
MCHC RBC-ENTMCNC: 30.2 G/DL
MCV RBC AUTO: 94.2 FL
MONOCYTES # BLD AUTO: 0.63 K/UL
MONOCYTES NFR BLD AUTO: 10.8 %
NEUTROPHILS # BLD AUTO: 4.02 K/UL
NEUTROPHILS NFR BLD AUTO: 68.5 %
PLATELET # BLD AUTO: 303 K/UL
POTASSIUM SERPL-SCNC: 5.2 MMOL/L
PROT SERPL-MCNC: 6.9 G/DL
RBC # BLD: 3.59 M/UL
RBC # FLD: 12.7 %
SODIUM SERPL-SCNC: 140 MMOL/L
TIBC SERPL-MCNC: 374 UG/DL
UIBC SERPL-MCNC: 127 UG/DL
WBC # FLD AUTO: 5.86 K/UL

## 2025-05-08 ENCOUNTER — APPOINTMENT (OUTPATIENT)
Dept: ORTHOPEDIC SURGERY | Facility: CLINIC | Age: 75
End: 2025-05-08
Payer: MEDICARE

## 2025-05-08 DIAGNOSIS — M16.11 UNILATERAL PRIMARY OSTEOARTHRITIS, RIGHT HIP: ICD-10-CM

## 2025-05-08 PROCEDURE — 99214 OFFICE O/P EST MOD 30 MIN: CPT

## 2025-05-13 ENCOUNTER — RESULT REVIEW (OUTPATIENT)
Age: 75
End: 2025-05-13

## 2025-05-28 ENCOUNTER — RX RENEWAL (OUTPATIENT)
Age: 75
End: 2025-05-28

## 2025-05-28 RX ORDER — LEVOTHYROXINE SODIUM 0.05 MG/1
50 TABLET ORAL
Qty: 90 | Refills: 0 | Status: ACTIVE | COMMUNITY
Start: 2025-05-28 | End: 1900-01-01

## 2025-05-31 ENCOUNTER — APPOINTMENT (OUTPATIENT)
Dept: FAMILY MEDICINE | Facility: CLINIC | Age: 75
End: 2025-05-31
Payer: MEDICARE

## 2025-05-31 VITALS
HEIGHT: 62.5 IN | OXYGEN SATURATION: 97 % | TEMPERATURE: 97.9 F | SYSTOLIC BLOOD PRESSURE: 124 MMHG | DIASTOLIC BLOOD PRESSURE: 80 MMHG | BODY MASS INDEX: 24.04 KG/M2 | HEART RATE: 88 BPM | WEIGHT: 134 LBS

## 2025-05-31 DIAGNOSIS — J01.00 ACUTE MAXILLARY SINUSITIS, UNSPECIFIED: ICD-10-CM

## 2025-05-31 DIAGNOSIS — F32.A ANXIETY DISORDER, UNSPECIFIED: ICD-10-CM

## 2025-05-31 DIAGNOSIS — F41.9 ANXIETY DISORDER, UNSPECIFIED: ICD-10-CM

## 2025-05-31 PROCEDURE — 99214 OFFICE O/P EST MOD 30 MIN: CPT

## 2025-05-31 RX ORDER — DOXYCYCLINE HYCLATE 100 MG/1
100 TABLET, COATED ORAL
Qty: 14 | Refills: 0 | Status: ACTIVE | COMMUNITY
Start: 2025-05-31 | End: 1900-01-01

## 2025-06-02 ENCOUNTER — RX CHANGE (OUTPATIENT)
Age: 75
End: 2025-06-02

## 2025-06-02 RX ORDER — AZELASTINE HYDROCHLORIDE AND FLUTICASONE PROPIONATE 137; 50 UG/1; UG/1
137-50 SPRAY, METERED NASAL TWICE DAILY
Qty: 1 | Refills: 0 | Status: DISCONTINUED | COMMUNITY
Start: 2025-05-31 | End: 2025-06-02

## 2025-06-02 RX ORDER — AZELASTINE 137 UG/1
137 SPRAY, METERED NASAL TWICE DAILY
Qty: 1 | Refills: 1 | Status: ACTIVE | COMMUNITY
Start: 1900-01-01 | End: 1900-01-01

## 2025-06-03 ENCOUNTER — RX RENEWAL (OUTPATIENT)
Age: 75
End: 2025-06-03

## 2025-06-03 RX ORDER — CHLORHEXIDINE GLUCONATE 4 %
325 (65 FE) LIQUID (ML) TOPICAL
Qty: 90 | Refills: 0 | Status: ACTIVE | COMMUNITY
Start: 2025-06-03 | End: 1900-01-01

## 2025-06-10 ENCOUNTER — APPOINTMENT (OUTPATIENT)
Dept: NEUROLOGY | Facility: CLINIC | Age: 75
End: 2025-06-10
Payer: MEDICARE

## 2025-06-10 VITALS
SYSTOLIC BLOOD PRESSURE: 151 MMHG | HEIGHT: 62 IN | WEIGHT: 133 LBS | DIASTOLIC BLOOD PRESSURE: 61 MMHG | HEART RATE: 73 BPM | RESPIRATION RATE: 16 BRPM | BODY MASS INDEX: 24.48 KG/M2

## 2025-06-10 PROCEDURE — 99214 OFFICE O/P EST MOD 30 MIN: CPT

## 2025-06-17 ENCOUNTER — APPOINTMENT (OUTPATIENT)
Dept: OTOLARYNGOLOGY | Facility: CLINIC | Age: 75
End: 2025-06-17
Payer: MEDICARE

## 2025-06-17 VITALS
SYSTOLIC BLOOD PRESSURE: 150 MMHG | HEART RATE: 67 BPM | BODY MASS INDEX: 24.48 KG/M2 | OXYGEN SATURATION: 98 % | DIASTOLIC BLOOD PRESSURE: 67 MMHG | WEIGHT: 133 LBS | HEIGHT: 62 IN

## 2025-06-17 PROCEDURE — 99214 OFFICE O/P EST MOD 30 MIN: CPT

## 2025-06-19 ENCOUNTER — RX RENEWAL (OUTPATIENT)
Age: 75
End: 2025-06-19

## 2025-06-26 ENCOUNTER — APPOINTMENT (OUTPATIENT)
Dept: ORTHOPEDIC SURGERY | Facility: CLINIC | Age: 75
End: 2025-06-26

## 2025-06-27 ENCOUNTER — TRANSCRIPTION ENCOUNTER (OUTPATIENT)
Age: 75
End: 2025-06-27

## 2025-07-08 ENCOUNTER — APPOINTMENT (OUTPATIENT)
Dept: FAMILY MEDICINE | Facility: CLINIC | Age: 75
End: 2025-07-08
Payer: MEDICARE

## 2025-07-08 VITALS
BODY MASS INDEX: 24.48 KG/M2 | TEMPERATURE: 97.8 F | HEIGHT: 62 IN | HEART RATE: 85 BPM | SYSTOLIC BLOOD PRESSURE: 126 MMHG | DIASTOLIC BLOOD PRESSURE: 82 MMHG | WEIGHT: 133 LBS | OXYGEN SATURATION: 96 %

## 2025-07-08 PROCEDURE — G2211 COMPLEX E/M VISIT ADD ON: CPT

## 2025-07-08 PROCEDURE — 99214 OFFICE O/P EST MOD 30 MIN: CPT

## 2025-07-08 RX ORDER — PREDNISONE 10 MG/1
10 TABLET ORAL
Qty: 15 | Refills: 0 | Status: ACTIVE | COMMUNITY
Start: 2025-07-08 | End: 1900-01-01

## 2025-07-08 RX ORDER — DOXYCYCLINE HYCLATE 100 MG/1
100 CAPSULE ORAL
Qty: 14 | Refills: 0 | Status: ACTIVE | COMMUNITY
Start: 2025-07-08 | End: 1900-01-01

## 2025-07-14 ENCOUNTER — APPOINTMENT (OUTPATIENT)
Dept: FAMILY MEDICINE | Facility: CLINIC | Age: 75
End: 2025-07-14
Payer: MEDICARE

## 2025-07-14 VITALS
BODY MASS INDEX: 24.66 KG/M2 | OXYGEN SATURATION: 95 % | SYSTOLIC BLOOD PRESSURE: 126 MMHG | HEART RATE: 94 BPM | TEMPERATURE: 97.2 F | DIASTOLIC BLOOD PRESSURE: 70 MMHG | HEIGHT: 62 IN | WEIGHT: 134 LBS

## 2025-07-14 PROBLEM — M53.3 COCCYX PAIN: Status: ACTIVE | Noted: 2025-07-14

## 2025-07-14 PROCEDURE — 99214 OFFICE O/P EST MOD 30 MIN: CPT

## 2025-07-14 PROCEDURE — G2211 COMPLEX E/M VISIT ADD ON: CPT

## 2025-07-18 LAB
25(OH)D3 SERPL-MCNC: 29 NG/ML
ALBUMIN SERPL ELPH-MCNC: 4.4 G/DL
ALP BLD-CCNC: 86 U/L
ALT SERPL-CCNC: 7 U/L
ANION GAP SERPL CALC-SCNC: 13 MMOL/L
AST SERPL-CCNC: 18 U/L
BASOPHILS # BLD AUTO: 0.07 K/UL
BASOPHILS NFR BLD AUTO: 0.7 %
BILIRUB SERPL-MCNC: 0.2 MG/DL
BUN SERPL-MCNC: 19 MG/DL
CALCIUM SERPL-MCNC: 9.9 MG/DL
CHLORIDE SERPL-SCNC: 104 MMOL/L
CO2 SERPL-SCNC: 22 MMOL/L
CREAT SERPL-MCNC: 0.81 MG/DL
EGFRCR SERPLBLD CKD-EPI 2021: 77 ML/MIN/1.73M2
EOSINOPHIL # BLD AUTO: 0.08 K/UL
EOSINOPHIL NFR BLD AUTO: 0.8 %
FERRITIN SERPL-MCNC: 27 NG/ML
GLUCOSE SERPL-MCNC: 119 MG/DL
HCT VFR BLD CALC: 38.5 %
HGB BLD-MCNC: 11.6 G/DL
IMM GRANULOCYTES NFR BLD AUTO: 0.5 %
IRON SATN MFR SERPL: 16 %
IRON SERPL-MCNC: 59 UG/DL
LYMPHOCYTES # BLD AUTO: 0.7 K/UL
LYMPHOCYTES NFR BLD AUTO: 6.8 %
MAN DIFF?: NORMAL
MCHC RBC-ENTMCNC: 28.3 PG
MCHC RBC-ENTMCNC: 30.1 G/DL
MCV RBC AUTO: 93.9 FL
MONOCYTES # BLD AUTO: 0.34 K/UL
MONOCYTES NFR BLD AUTO: 3.3 %
NEUTROPHILS # BLD AUTO: 8.99 K/UL
NEUTROPHILS NFR BLD AUTO: 87.9 %
PLATELET # BLD AUTO: 301 K/UL
POTASSIUM SERPL-SCNC: 5.2 MMOL/L
PROT SERPL-MCNC: 6.7 G/DL
RBC # BLD: 4.1 M/UL
RBC # FLD: 14.3 %
SODIUM SERPL-SCNC: 140 MMOL/L
T4 FREE SERPL-MCNC: 1.3 NG/DL
TIBC SERPL-MCNC: 363 UG/DL
TSH SERPL-ACNC: 4.12 UIU/ML
UIBC SERPL-MCNC: 304 UG/DL
WBC # FLD AUTO: 10.23 K/UL

## 2025-07-18 RX ORDER — LEVOFLOXACIN 500 MG/1
500 TABLET, FILM COATED ORAL DAILY
Qty: 7 | Refills: 0 | Status: ACTIVE | COMMUNITY
Start: 2025-07-18 | End: 1900-01-01

## 2025-07-24 DIAGNOSIS — C07 MALIGNANT NEOPLASM OF PAROTID GLAND: ICD-10-CM

## 2025-08-25 ENCOUNTER — RX RENEWAL (OUTPATIENT)
Age: 75
End: 2025-08-25

## 2025-08-29 ENCOUNTER — RX RENEWAL (OUTPATIENT)
Age: 75
End: 2025-08-29

## 2025-09-02 ENCOUNTER — APPOINTMENT (OUTPATIENT)
Dept: FAMILY MEDICINE | Facility: CLINIC | Age: 75
End: 2025-09-02
Payer: MEDICARE

## 2025-09-02 VITALS
TEMPERATURE: 97.7 F | DIASTOLIC BLOOD PRESSURE: 88 MMHG | WEIGHT: 133 LBS | SYSTOLIC BLOOD PRESSURE: 134 MMHG | RESPIRATION RATE: 16 BRPM | OXYGEN SATURATION: 96 % | HEART RATE: 88 BPM | BODY MASS INDEX: 24.48 KG/M2 | HEIGHT: 62 IN

## 2025-09-02 DIAGNOSIS — D64.9 ANEMIA, UNSPECIFIED: ICD-10-CM

## 2025-09-02 DIAGNOSIS — J44.9 CHRONIC OBSTRUCTIVE PULMONARY DISEASE, UNSPECIFIED: ICD-10-CM

## 2025-09-02 DIAGNOSIS — J01.00 ACUTE MAXILLARY SINUSITIS, UNSPECIFIED: ICD-10-CM

## 2025-09-02 PROCEDURE — 99214 OFFICE O/P EST MOD 30 MIN: CPT

## 2025-09-02 RX ORDER — CHOLECALCIFEROL (VITAMIN D3) 10(400)/ML
160 (50 FE) DROPS ORAL
Qty: 90 | Refills: 1 | Status: ACTIVE | COMMUNITY
Start: 2025-09-02 | End: 1900-01-01

## 2025-09-08 ENCOUNTER — NON-APPOINTMENT (OUTPATIENT)
Age: 75
End: 2025-09-08